# Patient Record
Sex: FEMALE | Race: WHITE | NOT HISPANIC OR LATINO | URBAN - METROPOLITAN AREA
[De-identification: names, ages, dates, MRNs, and addresses within clinical notes are randomized per-mention and may not be internally consistent; named-entity substitution may affect disease eponyms.]

---

## 2017-12-28 ENCOUNTER — OUTPATIENT (OUTPATIENT)
Dept: OUTPATIENT SERVICES | Facility: HOSPITAL | Age: 82
LOS: 1 days | Discharge: HOME | End: 2017-12-28

## 2017-12-28 DIAGNOSIS — Z47.89 ENCOUNTER FOR OTHER ORTHOPEDIC AFTERCARE: ICD-10-CM

## 2017-12-30 ENCOUNTER — OUTPATIENT (OUTPATIENT)
Dept: OUTPATIENT SERVICES | Facility: HOSPITAL | Age: 82
LOS: 1 days | Discharge: HOME | End: 2017-12-30

## 2017-12-30 DIAGNOSIS — R50.9 FEVER, UNSPECIFIED: ICD-10-CM

## 2017-12-31 ENCOUNTER — OUTPATIENT (OUTPATIENT)
Dept: OUTPATIENT SERVICES | Facility: HOSPITAL | Age: 82
LOS: 1 days | Discharge: HOME | End: 2017-12-31

## 2017-12-31 DIAGNOSIS — D64.9 ANEMIA, UNSPECIFIED: ICD-10-CM

## 2018-01-01 ENCOUNTER — OUTPATIENT (OUTPATIENT)
Dept: OUTPATIENT SERVICES | Facility: HOSPITAL | Age: 83
LOS: 1 days | Discharge: HOME | End: 2018-01-01

## 2018-01-01 DIAGNOSIS — R79.9 ABNORMAL FINDING OF BLOOD CHEMISTRY, UNSPECIFIED: ICD-10-CM

## 2018-01-01 DIAGNOSIS — K86.9 DISEASE OF PANCREAS, UNSPECIFIED: ICD-10-CM

## 2018-01-01 DIAGNOSIS — D64.9 ANEMIA, UNSPECIFIED: ICD-10-CM

## 2018-01-01 DIAGNOSIS — I25.10 ATHEROSCLEROTIC HEART DISEASE OF NATIVE CORONARY ARTERY WITHOUT ANGINA PECTORIS: ICD-10-CM

## 2018-01-01 DIAGNOSIS — R17 UNSPECIFIED JAUNDICE: ICD-10-CM

## 2018-01-04 ENCOUNTER — OUTPATIENT (OUTPATIENT)
Dept: OUTPATIENT SERVICES | Facility: HOSPITAL | Age: 83
LOS: 1 days | Discharge: HOME | End: 2018-01-04

## 2018-01-04 DIAGNOSIS — R79.9 ABNORMAL FINDING OF BLOOD CHEMISTRY, UNSPECIFIED: ICD-10-CM

## 2018-01-04 DIAGNOSIS — I25.10 ATHEROSCLEROTIC HEART DISEASE OF NATIVE CORONARY ARTERY WITHOUT ANGINA PECTORIS: ICD-10-CM

## 2018-01-04 DIAGNOSIS — K86.9 DISEASE OF PANCREAS, UNSPECIFIED: ICD-10-CM

## 2018-01-04 DIAGNOSIS — R17 UNSPECIFIED JAUNDICE: ICD-10-CM

## 2018-01-04 DIAGNOSIS — D64.9 ANEMIA, UNSPECIFIED: ICD-10-CM

## 2018-01-05 ENCOUNTER — OUTPATIENT (OUTPATIENT)
Dept: OUTPATIENT SERVICES | Facility: HOSPITAL | Age: 83
LOS: 1 days | Discharge: HOME | End: 2018-01-05

## 2018-01-05 DIAGNOSIS — R17 UNSPECIFIED JAUNDICE: ICD-10-CM

## 2018-01-05 DIAGNOSIS — R79.9 ABNORMAL FINDING OF BLOOD CHEMISTRY, UNSPECIFIED: ICD-10-CM

## 2018-01-05 DIAGNOSIS — I25.10 ATHEROSCLEROTIC HEART DISEASE OF NATIVE CORONARY ARTERY WITHOUT ANGINA PECTORIS: ICD-10-CM

## 2018-01-05 DIAGNOSIS — D64.9 ANEMIA, UNSPECIFIED: ICD-10-CM

## 2018-01-05 DIAGNOSIS — K86.9 DISEASE OF PANCREAS, UNSPECIFIED: ICD-10-CM

## 2018-01-11 ENCOUNTER — OUTPATIENT (OUTPATIENT)
Dept: OUTPATIENT SERVICES | Facility: HOSPITAL | Age: 83
LOS: 1 days | Discharge: HOME | End: 2018-01-11

## 2018-01-11 DIAGNOSIS — K86.9 DISEASE OF PANCREAS, UNSPECIFIED: ICD-10-CM

## 2018-01-11 DIAGNOSIS — D64.9 ANEMIA, UNSPECIFIED: ICD-10-CM

## 2018-01-11 DIAGNOSIS — I25.10 ATHEROSCLEROTIC HEART DISEASE OF NATIVE CORONARY ARTERY WITHOUT ANGINA PECTORIS: ICD-10-CM

## 2018-01-11 DIAGNOSIS — R17 UNSPECIFIED JAUNDICE: ICD-10-CM

## 2018-01-11 DIAGNOSIS — R79.9 ABNORMAL FINDING OF BLOOD CHEMISTRY, UNSPECIFIED: ICD-10-CM

## 2018-01-18 ENCOUNTER — OUTPATIENT (OUTPATIENT)
Dept: OUTPATIENT SERVICES | Facility: HOSPITAL | Age: 83
LOS: 1 days | Discharge: HOME | End: 2018-01-18

## 2018-01-18 DIAGNOSIS — I25.10 ATHEROSCLEROTIC HEART DISEASE OF NATIVE CORONARY ARTERY WITHOUT ANGINA PECTORIS: ICD-10-CM

## 2018-01-18 DIAGNOSIS — R17 UNSPECIFIED JAUNDICE: ICD-10-CM

## 2018-01-18 DIAGNOSIS — D64.9 ANEMIA, UNSPECIFIED: ICD-10-CM

## 2018-01-18 DIAGNOSIS — K86.9 DISEASE OF PANCREAS, UNSPECIFIED: ICD-10-CM

## 2018-01-18 DIAGNOSIS — R79.9 ABNORMAL FINDING OF BLOOD CHEMISTRY, UNSPECIFIED: ICD-10-CM

## 2018-01-22 ENCOUNTER — INPATIENT (INPATIENT)
Facility: HOSPITAL | Age: 83
LOS: 10 days | Discharge: NURSING, REHAB OR LONG-TERM CARE | End: 2018-02-02
Attending: HOSPITALIST

## 2018-01-22 ENCOUNTER — OUTPATIENT (OUTPATIENT)
Dept: OUTPATIENT SERVICES | Facility: HOSPITAL | Age: 83
LOS: 1 days | Discharge: HOME | End: 2018-01-22

## 2018-01-22 DIAGNOSIS — R79.9 ABNORMAL FINDING OF BLOOD CHEMISTRY, UNSPECIFIED: ICD-10-CM

## 2018-02-02 PROBLEM — Z00.00 ENCOUNTER FOR PREVENTIVE HEALTH EXAMINATION: Status: ACTIVE | Noted: 2018-02-02

## 2018-02-03 ENCOUNTER — OUTPATIENT (OUTPATIENT)
Dept: OUTPATIENT SERVICES | Facility: HOSPITAL | Age: 83
LOS: 1 days | Discharge: HOME | End: 2018-02-03

## 2018-02-03 DIAGNOSIS — E78.00 PURE HYPERCHOLESTEROLEMIA, UNSPECIFIED: ICD-10-CM

## 2018-02-04 DIAGNOSIS — R17 UNSPECIFIED JAUNDICE: ICD-10-CM

## 2018-02-04 DIAGNOSIS — D64.9 ANEMIA, UNSPECIFIED: ICD-10-CM

## 2018-02-04 DIAGNOSIS — I25.10 ATHEROSCLEROTIC HEART DISEASE OF NATIVE CORONARY ARTERY WITHOUT ANGINA PECTORIS: ICD-10-CM

## 2018-02-04 DIAGNOSIS — K86.9 DISEASE OF PANCREAS, UNSPECIFIED: ICD-10-CM

## 2018-02-05 ENCOUNTER — OUTPATIENT (OUTPATIENT)
Dept: OUTPATIENT SERVICES | Facility: HOSPITAL | Age: 83
LOS: 1 days | Discharge: HOME | End: 2018-02-05

## 2018-02-05 DIAGNOSIS — R79.9 ABNORMAL FINDING OF BLOOD CHEMISTRY, UNSPECIFIED: ICD-10-CM

## 2018-02-06 ENCOUNTER — OUTPATIENT (OUTPATIENT)
Dept: OUTPATIENT SERVICES | Facility: HOSPITAL | Age: 83
LOS: 1 days | Discharge: HOME | End: 2018-02-06

## 2018-02-06 DIAGNOSIS — R17 UNSPECIFIED JAUNDICE: ICD-10-CM

## 2018-02-06 DIAGNOSIS — R53.83 OTHER FATIGUE: ICD-10-CM

## 2018-02-06 DIAGNOSIS — E78.00 PURE HYPERCHOLESTEROLEMIA, UNSPECIFIED: ICD-10-CM

## 2018-02-06 DIAGNOSIS — I10 ESSENTIAL (PRIMARY) HYPERTENSION: ICD-10-CM

## 2018-02-06 DIAGNOSIS — I25.10 ATHEROSCLEROTIC HEART DISEASE OF NATIVE CORONARY ARTERY WITHOUT ANGINA PECTORIS: ICD-10-CM

## 2018-02-06 DIAGNOSIS — K86.9 DISEASE OF PANCREAS, UNSPECIFIED: ICD-10-CM

## 2018-02-06 DIAGNOSIS — D64.9 ANEMIA, UNSPECIFIED: ICD-10-CM

## 2018-02-06 DIAGNOSIS — E87.1 HYPO-OSMOLALITY AND HYPONATREMIA: ICD-10-CM

## 2018-02-06 DIAGNOSIS — K80.50 CALCULUS OF BILE DUCT WITHOUT CHOLANGITIS OR CHOLECYSTITIS WITHOUT OBSTRUCTION: ICD-10-CM

## 2018-02-06 DIAGNOSIS — E87.5 HYPERKALEMIA: ICD-10-CM

## 2018-02-06 DIAGNOSIS — K44.9 DIAPHRAGMATIC HERNIA WITHOUT OBSTRUCTION OR GANGRENE: ICD-10-CM

## 2018-02-06 DIAGNOSIS — Z79.899 OTHER LONG TERM (CURRENT) DRUG THERAPY: ICD-10-CM

## 2018-02-06 DIAGNOSIS — C25.9 MALIGNANT NEOPLASM OF PANCREAS, UNSPECIFIED: ICD-10-CM

## 2018-02-07 ENCOUNTER — OUTPATIENT (OUTPATIENT)
Dept: OUTPATIENT SERVICES | Facility: HOSPITAL | Age: 83
LOS: 1 days | Discharge: HOME | End: 2018-02-07

## 2018-02-07 DIAGNOSIS — D13.6 BENIGN NEOPLASM OF PANCREAS: ICD-10-CM

## 2018-02-07 DIAGNOSIS — K76.9 LIVER DISEASE, UNSPECIFIED: ICD-10-CM

## 2018-02-09 ENCOUNTER — OUTPATIENT (OUTPATIENT)
Dept: OUTPATIENT SERVICES | Facility: HOSPITAL | Age: 83
LOS: 1 days | Discharge: HOME | End: 2018-02-09

## 2018-02-09 DIAGNOSIS — R79.9 ABNORMAL FINDING OF BLOOD CHEMISTRY, UNSPECIFIED: ICD-10-CM

## 2018-02-12 ENCOUNTER — APPOINTMENT (OUTPATIENT)
Dept: SURGERY | Facility: CLINIC | Age: 83
End: 2018-02-12
Payer: MEDICARE

## 2018-02-12 VITALS
DIASTOLIC BLOOD PRESSURE: 64 MMHG | HEIGHT: 63 IN | BODY MASS INDEX: 20.91 KG/M2 | HEART RATE: 76 BPM | TEMPERATURE: 98.4 F | SYSTOLIC BLOOD PRESSURE: 136 MMHG | WEIGHT: 118 LBS

## 2018-02-12 DIAGNOSIS — G30.9 ALZHEIMER'S DISEASE, UNSPECIFIED: ICD-10-CM

## 2018-02-12 DIAGNOSIS — I10 ESSENTIAL (PRIMARY) HYPERTENSION: ICD-10-CM

## 2018-02-12 DIAGNOSIS — F02.80 ALZHEIMER'S DISEASE, UNSPECIFIED: ICD-10-CM

## 2018-02-12 PROCEDURE — 99214 OFFICE O/P EST MOD 30 MIN: CPT

## 2018-02-16 ENCOUNTER — OUTPATIENT (OUTPATIENT)
Dept: OUTPATIENT SERVICES | Facility: HOSPITAL | Age: 83
LOS: 1 days | Discharge: HOME | End: 2018-02-16

## 2018-02-16 DIAGNOSIS — R79.9 ABNORMAL FINDING OF BLOOD CHEMISTRY, UNSPECIFIED: ICD-10-CM

## 2018-02-17 RX ORDER — ENOXAPARIN SODIUM 100 MG/ML
40 INJECTION SUBCUTANEOUS
Refills: 0 | Status: ACTIVE | COMMUNITY

## 2018-02-17 RX ORDER — PANTOPRAZOLE SODIUM 40 MG/1
40 TABLET, DELAYED RELEASE ORAL
Refills: 0 | Status: ACTIVE | COMMUNITY

## 2018-02-23 ENCOUNTER — OUTPATIENT (OUTPATIENT)
Dept: OUTPATIENT SERVICES | Facility: HOSPITAL | Age: 83
LOS: 1 days | Discharge: HOME | End: 2018-02-23

## 2018-02-23 DIAGNOSIS — R79.9 ABNORMAL FINDING OF BLOOD CHEMISTRY, UNSPECIFIED: ICD-10-CM

## 2018-03-02 ENCOUNTER — OUTPATIENT (OUTPATIENT)
Dept: OUTPATIENT SERVICES | Facility: HOSPITAL | Age: 83
LOS: 1 days | Discharge: HOME | End: 2018-03-02

## 2018-03-02 DIAGNOSIS — R79.9 ABNORMAL FINDING OF BLOOD CHEMISTRY, UNSPECIFIED: ICD-10-CM

## 2018-03-09 ENCOUNTER — OUTPATIENT (OUTPATIENT)
Dept: OUTPATIENT SERVICES | Facility: HOSPITAL | Age: 83
LOS: 1 days | Discharge: HOME | End: 2018-03-09

## 2018-03-09 DIAGNOSIS — R79.9 ABNORMAL FINDING OF BLOOD CHEMISTRY, UNSPECIFIED: ICD-10-CM

## 2018-03-12 ENCOUNTER — OUTPATIENT (OUTPATIENT)
Dept: OUTPATIENT SERVICES | Facility: HOSPITAL | Age: 83
LOS: 1 days | Discharge: HOME | End: 2018-03-12

## 2018-03-12 DIAGNOSIS — R79.9 ABNORMAL FINDING OF BLOOD CHEMISTRY, UNSPECIFIED: ICD-10-CM

## 2018-03-12 LAB — AMMONIA BLD-MCNC: 32 MMOL/L — SIGNIFICANT CHANGE UP (ref 11–55)

## 2018-03-17 ENCOUNTER — OUTPATIENT (OUTPATIENT)
Dept: OUTPATIENT SERVICES | Facility: HOSPITAL | Age: 83
LOS: 1 days | Discharge: HOME | End: 2018-03-17

## 2018-03-17 DIAGNOSIS — E72.20 DISORDER OF UREA CYCLE METABOLISM, UNSPECIFIED: ICD-10-CM

## 2018-03-17 DIAGNOSIS — Z11.3 ENCOUNTER FOR SCREENING FOR INFECTIONS WITH A PREDOMINANTLY SEXUAL MODE OF TRANSMISSION: ICD-10-CM

## 2018-03-20 ENCOUNTER — OUTPATIENT (OUTPATIENT)
Dept: OUTPATIENT SERVICES | Facility: HOSPITAL | Age: 83
LOS: 1 days | Discharge: HOME | End: 2018-03-20

## 2018-03-20 DIAGNOSIS — D64.9 ANEMIA, UNSPECIFIED: ICD-10-CM

## 2018-03-26 ENCOUNTER — APPOINTMENT (OUTPATIENT)
Dept: SURGERY | Facility: CLINIC | Age: 83
End: 2018-03-26
Payer: MEDICARE

## 2018-03-26 VITALS
BODY MASS INDEX: 18.43 KG/M2 | SYSTOLIC BLOOD PRESSURE: 132 MMHG | DIASTOLIC BLOOD PRESSURE: 70 MMHG | HEIGHT: 63 IN | TEMPERATURE: 99.2 F | HEART RATE: 102 BPM | WEIGHT: 104 LBS

## 2018-03-26 PROCEDURE — 99213 OFFICE O/P EST LOW 20 MIN: CPT

## 2018-04-03 ENCOUNTER — OUTPATIENT (OUTPATIENT)
Dept: OUTPATIENT SERVICES | Facility: HOSPITAL | Age: 83
LOS: 1 days | Discharge: HOME | End: 2018-04-03

## 2018-04-03 DIAGNOSIS — D64.9 ANEMIA, UNSPECIFIED: ICD-10-CM

## 2018-06-26 ENCOUNTER — OUTPATIENT (OUTPATIENT)
Dept: OUTPATIENT SERVICES | Facility: HOSPITAL | Age: 83
LOS: 1 days | Discharge: HOME | End: 2018-06-26

## 2018-06-26 DIAGNOSIS — D64.9 ANEMIA, UNSPECIFIED: ICD-10-CM

## 2018-11-03 ENCOUNTER — OUTPATIENT (OUTPATIENT)
Dept: OUTPATIENT SERVICES | Facility: HOSPITAL | Age: 83
LOS: 1 days | Discharge: HOME | End: 2018-11-03

## 2018-11-03 DIAGNOSIS — E78.5 HYPERLIPIDEMIA, UNSPECIFIED: ICD-10-CM

## 2019-02-10 PROBLEM — D49.0 IPMN (INTRADUCTAL PAPILLARY MUCINOUS NEOPLASM): Status: ACTIVE | Noted: 2018-02-12

## 2019-02-11 ENCOUNTER — APPOINTMENT (OUTPATIENT)
Dept: SURGERY | Facility: CLINIC | Age: 84
End: 2019-02-11

## 2019-02-11 DIAGNOSIS — D49.0 NEOPLASM OF UNSPECIFIED BEHAVIOR OF DIGESTIVE SYSTEM: ICD-10-CM

## 2021-09-13 ENCOUNTER — INPATIENT (INPATIENT)
Facility: HOSPITAL | Age: 86
LOS: 7 days | Discharge: NURSING, REHAB OR LONG-TERM CARE | End: 2021-09-21
Attending: INTERNAL MEDICINE | Admitting: INTERNAL MEDICINE
Payer: MEDICARE

## 2021-09-13 VITALS
SYSTOLIC BLOOD PRESSURE: 85 MMHG | OXYGEN SATURATION: 98 % | RESPIRATION RATE: 16 BRPM | HEART RATE: 100 BPM | DIASTOLIC BLOOD PRESSURE: 49 MMHG

## 2021-09-13 LAB
ALBUMIN SERPL ELPH-MCNC: 4.6 G/DL — SIGNIFICANT CHANGE UP (ref 3.5–5.2)
ALP SERPL-CCNC: 210 U/L — HIGH (ref 30–115)
ALT FLD-CCNC: 65 U/L — HIGH (ref 0–41)
ANION GAP SERPL CALC-SCNC: 23 MMOL/L — HIGH (ref 7–14)
APPEARANCE UR: ABNORMAL
APTT BLD: 29.5 SEC — SIGNIFICANT CHANGE UP (ref 27–39.2)
AST SERPL-CCNC: 54 U/L — HIGH (ref 0–41)
BACTERIA # UR AUTO: ABNORMAL
BASE EXCESS BLDV CALC-SCNC: -12.2 MMOL/L — LOW (ref -2–3)
BASOPHILS # BLD AUTO: 0.02 K/UL — SIGNIFICANT CHANGE UP (ref 0–0.2)
BASOPHILS NFR BLD AUTO: 0.1 % — SIGNIFICANT CHANGE UP (ref 0–1)
BILIRUB SERPL-MCNC: 0.7 MG/DL — SIGNIFICANT CHANGE UP (ref 0.2–1.2)
BILIRUB UR-MCNC: NEGATIVE — SIGNIFICANT CHANGE UP
BUN SERPL-MCNC: 191 MG/DL — CRITICAL HIGH (ref 10–20)
CA-I SERPL-SCNC: 1.35 MMOL/L — HIGH (ref 1.15–1.33)
CALCIUM SERPL-MCNC: 11.4 MG/DL — HIGH (ref 8.5–10.1)
CHLORIDE SERPL-SCNC: 109 MMOL/L — SIGNIFICANT CHANGE UP (ref 98–110)
CO2 SERPL-SCNC: 10 MMOL/L — LOW (ref 17–32)
COLOR SPEC: YELLOW — SIGNIFICANT CHANGE UP
CREAT SERPL-MCNC: 8.6 MG/DL — CRITICAL HIGH (ref 0.7–1.5)
DIFF PNL FLD: ABNORMAL
EOSINOPHIL # BLD AUTO: 0 K/UL — SIGNIFICANT CHANGE UP (ref 0–0.7)
EOSINOPHIL NFR BLD AUTO: 0 % — SIGNIFICANT CHANGE UP (ref 0–8)
EPI CELLS # UR: 1 /HPF — SIGNIFICANT CHANGE UP (ref 0–5)
GAS PNL BLDV: 134 MMOL/L — LOW (ref 136–145)
GAS PNL BLDV: SIGNIFICANT CHANGE UP
GLUCOSE BLDC GLUCOMTR-MCNC: 193 MG/DL — HIGH (ref 70–99)
GLUCOSE BLDC GLUCOMTR-MCNC: 258 MG/DL — HIGH (ref 70–99)
GLUCOSE SERPL-MCNC: 263 MG/DL — HIGH (ref 70–99)
GLUCOSE UR QL: NEGATIVE — SIGNIFICANT CHANGE UP
HCO3 BLDV-SCNC: 13 MMOL/L — LOW (ref 22–29)
HCT VFR BLD CALC: 47.6 % — HIGH (ref 37–47)
HGB BLD-MCNC: 15.1 G/DL — SIGNIFICANT CHANGE UP (ref 12–16)
HYALINE CASTS # UR AUTO: 26 /LPF — HIGH (ref 0–7)
IMM GRANULOCYTES NFR BLD AUTO: 1 % — HIGH (ref 0.1–0.3)
INR BLD: 1.19 RATIO — SIGNIFICANT CHANGE UP (ref 0.65–1.3)
KETONES UR-MCNC: NEGATIVE — SIGNIFICANT CHANGE UP
LACTATE BLDV-MCNC: 3.1 MMOL/L — HIGH (ref 0.5–2)
LEUKOCYTE ESTERASE UR-ACNC: ABNORMAL
LYMPHOCYTES # BLD AUTO: 1.11 K/UL — LOW (ref 1.2–3.4)
LYMPHOCYTES # BLD AUTO: 6.2 % — LOW (ref 20.5–51.1)
MCHC RBC-ENTMCNC: 30.2 PG — SIGNIFICANT CHANGE UP (ref 27–31)
MCHC RBC-ENTMCNC: 31.7 G/DL — LOW (ref 32–37)
MCV RBC AUTO: 95.2 FL — SIGNIFICANT CHANGE UP (ref 81–99)
MONOCYTES # BLD AUTO: 0.79 K/UL — HIGH (ref 0.1–0.6)
MONOCYTES NFR BLD AUTO: 4.4 % — SIGNIFICANT CHANGE UP (ref 1.7–9.3)
NEUTROPHILS # BLD AUTO: 15.94 K/UL — HIGH (ref 1.4–6.5)
NEUTROPHILS NFR BLD AUTO: 88.3 % — HIGH (ref 42.2–75.2)
NITRITE UR-MCNC: NEGATIVE — SIGNIFICANT CHANGE UP
NRBC # BLD: 0 /100 WBCS — SIGNIFICANT CHANGE UP (ref 0–0)
PCO2 BLDV: 29 MMHG — LOW (ref 39–42)
PH BLDV: 7.27 — LOW (ref 7.32–7.43)
PH UR: 6.5 — SIGNIFICANT CHANGE UP (ref 5–8)
PLATELET # BLD AUTO: 396 K/UL — SIGNIFICANT CHANGE UP (ref 130–400)
PO2 BLDV: 45 MMHG — SIGNIFICANT CHANGE UP
POTASSIUM BLDV-SCNC: 8.6 MMOL/L — CRITICAL HIGH (ref 3.5–5.1)
POTASSIUM SERPL-MCNC: 8.1 MMOL/L — CRITICAL HIGH (ref 3.5–5)
POTASSIUM SERPL-SCNC: 8.1 MMOL/L — CRITICAL HIGH (ref 3.5–5)
PROT SERPL-MCNC: 8.3 G/DL — HIGH (ref 6–8)
PROT UR-MCNC: ABNORMAL
PROTHROM AB SERPL-ACNC: 13.7 SEC — HIGH (ref 9.95–12.87)
RBC # BLD: 5 M/UL — SIGNIFICANT CHANGE UP (ref 4.2–5.4)
RBC # FLD: 13.5 % — SIGNIFICANT CHANGE UP (ref 11.5–14.5)
RBC CASTS # UR COMP ASSIST: 2 /HPF — SIGNIFICANT CHANGE UP (ref 0–4)
SARS-COV-2 RNA SPEC QL NAA+PROBE: SIGNIFICANT CHANGE UP
SODIUM SERPL-SCNC: 142 MMOL/L — SIGNIFICANT CHANGE UP (ref 135–146)
SP GR SPEC: 1.02 — SIGNIFICANT CHANGE UP (ref 1.01–1.03)
UROBILINOGEN FLD QL: SIGNIFICANT CHANGE UP
WBC # BLD: 18.04 K/UL — HIGH (ref 4.8–10.8)
WBC # FLD AUTO: 18.04 K/UL — HIGH (ref 4.8–10.8)
WBC UR QL: >720 /HPF — HIGH (ref 0–5)

## 2021-09-13 PROCEDURE — 99291 CRITICAL CARE FIRST HOUR: CPT

## 2021-09-13 PROCEDURE — 93010 ELECTROCARDIOGRAM REPORT: CPT

## 2021-09-13 RX ORDER — VANCOMYCIN HCL 1 G
1000 VIAL (EA) INTRAVENOUS ONCE
Refills: 0 | Status: COMPLETED | OUTPATIENT
Start: 2021-09-13 | End: 2021-09-13

## 2021-09-13 RX ORDER — CEFEPIME 1 G/1
2000 INJECTION, POWDER, FOR SOLUTION INTRAMUSCULAR; INTRAVENOUS ONCE
Refills: 0 | Status: COMPLETED | OUTPATIENT
Start: 2021-09-13 | End: 2021-09-13

## 2021-09-13 RX ORDER — SODIUM CHLORIDE 9 MG/ML
1500 INJECTION, SOLUTION INTRAVENOUS ONCE
Refills: 0 | Status: COMPLETED | OUTPATIENT
Start: 2021-09-13 | End: 2021-09-13

## 2021-09-13 RX ORDER — INSULIN HUMAN 100 [IU]/ML
10 INJECTION, SOLUTION SUBCUTANEOUS ONCE
Refills: 0 | Status: COMPLETED | OUTPATIENT
Start: 2021-09-13 | End: 2021-09-13

## 2021-09-13 RX ORDER — ACETAMINOPHEN 500 MG
650 TABLET ORAL ONCE
Refills: 0 | Status: COMPLETED | OUTPATIENT
Start: 2021-09-13 | End: 2021-09-13

## 2021-09-13 RX ORDER — SODIUM BICARBONATE 1 MEQ/ML
0.75 SYRINGE (ML) INTRAVENOUS
Qty: 150 | Refills: 0 | Status: DISCONTINUED | OUTPATIENT
Start: 2021-09-13 | End: 2021-09-14

## 2021-09-13 RX ORDER — DEXTROSE 50 % IN WATER 50 %
50 SYRINGE (ML) INTRAVENOUS ONCE
Refills: 0 | Status: COMPLETED | OUTPATIENT
Start: 2021-09-13 | End: 2021-09-13

## 2021-09-13 RX ADMIN — Medication 250 MEQ/KG/HR: at 22:08

## 2021-09-13 RX ADMIN — INSULIN HUMAN 10 UNIT(S): 100 INJECTION, SOLUTION SUBCUTANEOUS at 21:48

## 2021-09-13 RX ADMIN — Medication 50 MILLILITER(S): at 21:48

## 2021-09-13 RX ADMIN — Medication 650 MILLIGRAM(S): at 17:07

## 2021-09-13 RX ADMIN — SODIUM CHLORIDE 1000 MILLILITER(S): 9 INJECTION, SOLUTION INTRAVENOUS at 21:46

## 2021-09-13 RX ADMIN — Medication 250 MILLIGRAM(S): at 17:06

## 2021-09-13 RX ADMIN — CEFEPIME 100 MILLIGRAM(S): 1 INJECTION, POWDER, FOR SOLUTION INTRAMUSCULAR; INTRAVENOUS at 17:07

## 2021-09-13 NOTE — ED ADULT TRIAGE NOTE - CHIEF COMPLAINT QUOTE
PT BIBA from [facility] for unresponsiveness. PT is hypotensive and tachycardic on arrival and unresponsive to verbal & painful stimuli.

## 2021-09-13 NOTE — ED PROVIDER NOTE - PHYSICAL EXAMINATION
GENERAL: Ill-appearing   SKIN: warm, dry  HEAD: Normocephalic; atraumatic.  EYES: PERRLA, EOMI  CARD: S1, S2 normal; no murmurs, gallops, or rubs. Tachycardic rate   RESP: LCTAB; No wheezes, rales, rhonchi, or stridor.  ABD: soft, nontender, and nondistended  NEURO: A&O x 0, withdraws to pain

## 2021-09-13 NOTE — H&P ADULT - CONVERSATION DETAILS
spoke about grave prognosis and isaac discussed as patient wanted to be DNR/DNI when she had capacity.

## 2021-09-13 NOTE — ED PROVIDER NOTE - ATTENDING CONTRIBUTION TO CARE
I personally evaluated the patient. I reviewed the Resident’s or Physician Assistant’s note (as assigned above), and agree with the findings and plan except as documented in my note.   89 Y/O F DEMENTIA, HTN, CAD, PANCREATIC MASS BROUGHT TO ED FROM SNF WITH DECREASED RESPONSIVENESS TODAY. + LETHARGY. PT UNABLE TO GIVE HX DUE TO AMS. PT WAS DNR/DNH AT SNF WHICH WAS RESCINDED BY THE FAMILY TODAY. VITALS NOTED. RESPONSIVE TO PAINFUL STIMULI. NCAT PERRL. EOMI. OP WITH DRIED FOOD. DRY MUCOUS MEMBRANES. NECK SUPPLE. LUNGS CLEAR B/L. TACHYCARDIA. S1S2. ABD- SOFT NONTENDER. NO LEG EDEMA. NO FACIAL DROOP. PT MOVING ALL EXTREMITIES. NO RASH.

## 2021-09-13 NOTE — ED ADULT NURSE NOTE - CHIEF COMPLAINT QUOTE
PT BIBA from [facility] for unresponsiveness. PT is hypotensive and tachycardic on arrival and unresponsive to verbal & painful stimuli.

## 2021-09-13 NOTE — ED ADULT NURSE NOTE - PAIN: PRESENCE, MLM
Date & Time: 6/17/2020, 11:02 AM  Patient: Isela Hines  Encounter Provider(s):    Nicki Gray MD       To Whom It May Concern:    Roberta Douglas was seen and treated in our department on 6/17/2020.  She should not return to work until 06/20/202 non-verbal indicators of pain/discomfort absent

## 2021-09-13 NOTE — CHART NOTE - NSCHARTNOTEFT_GEN_A_CORE
Consult received for [facility] re: patient who previously was DNR/DNI/DNH.   The patient was living at [facility], AMS X days, brought in for evaluation.    Called son, Jesus, who is offical HCP, and his wife, Tali, on phone.     Per family, patient never wanted to be intubated, or kept alive on life support, feeding tubes, etc. She was doing well at NH, and had a DNR/I/do not rehospitalize order on her MOLST. Her family however did decide to have her hospitalized out of concern for her abrupt change in mental status.     Patient's other son, Musa, strongly feels the patient should be Full Code and everything should be done to keep her alive, based on Judaism beliefs. Due to his change in beliefs, family revoked the DNR/DNI this hospitalization. Delta (HCP) and his wife would not like the patient intubated but include Musa in decision making. Encouraged them to have a further conversation with Musa about honoring the patient's wishes to not be intubated or kept on life support. Discussed that CPR and intubation would very likely prove to be more burdensome than therapeutic in a patient of her age/comorbidities. Provided family with palliative # for questions.     Plan to FU for FUll consult tomorrow.    PRN

## 2021-09-13 NOTE — H&P ADULT - HISTORY OF PRESENT ILLNESS
89 yo female w/ PMH of pancreatic mass, CAD, HTN presents for unresponsiveness.  Unable to provide further HPI due to mental status. Per nursing home NP, baseline mental status is A&O x 1, for past couple weeks a little bit more confused than normal, yesterday started becoming lethargic and minimally responsive.     89 yo female w/ PMH of pancreatic mass, CAD, HTN presents for unresponsiveness. Unable to provide further HPI due to mental status. Per nursing home NP, baseline mental status is A&Ox1, for past couple weeks a little bit more confused than normal, yesterday started becoming lethargic and minimally responsive.Patient is from [facility] and was DNR/DNI/DNH but due to her worsening condition. Sons were spoken to and initially rescinded the DNR/DNI/DNH but due to patient's clinical condition and poor prognosis, patient was made DNR/DNI.     In the ED, labs were remarkable for K 8.1, bicarb 10, , Cr 8.6 (baseline 1.0 in february), Ca 11.4, protein 8.4, VBG pH 7.27, pCO2 29. UA is positive. EKG is NSR.     Vital Signs Last 24 Hrs  T(C): --  T(F): --  HR: 88 (13 Sep 2021 22:10) (84 - 150)  BP: 106/62 (13 Sep 2021 22:10) (85/49 - 169/104)  BP(mean): 79 (13 Sep 2021 22:10) (63 - 124)  ABP: --  ABP(mean): --  RR: 18 (13 Sep 2021 22:10) (16 - 18)  SpO2: 100% (13 Sep 2021 22:10) (97% - 100%)

## 2021-09-13 NOTE — H&P ADULT - NSHPLABSRESULTS_GEN_ALL_CORE
15.1   18.04 )-----------( 396      ( 13 Sep 2021 16:50 )             47.6           142  |  109  |  191<HH>  ----------------------------<  263<H>  8.1<HH>   |  10<L>  |  8.6<HH>    Ca    11.4<H>      13 Sep 2021 16:50    TPro  8.3<H>  /  Alb  4.6  /  TBili  0.7  /  DBili  x   /  AST  54<H>  /  ALT  65<H>  /  AlkPhos  210<H>                Urinalysis Basic - ( 13 Sep 2021 19:20 )    Color: Yellow / Appearance: Turbid / S.017 / pH: x  Gluc: x / Ketone: Negative  / Bili: Negative / Urobili: <2 mg/dL   Blood: x / Protein: 300 mg/dL / Nitrite: Negative   Leuk Esterase: Large / RBC: 2 /HPF / WBC >720 /HPF   Sq Epi: x / Non Sq Epi: 1 /HPF / Bacteria: Moderate        PT/INR - ( 13 Sep 2021 16:50 )   PT: 13.70 sec;   INR: 1.19 ratio         PTT - ( 13 Sep 2021 16:50 )  PTT:29.5 sec    Lactate Trend            CAPILLARY BLOOD GLUCOSE      POCT Blood Glucose.: 258 mg/dL (13 Sep 2021 22:18)

## 2021-09-13 NOTE — ED PROVIDER NOTE - PROGRESS NOTE DETAILS
Raffi: Discussed with pt's sons Jesus Zonia and Musa Brar.  Jesus is the health care proxy and would like to have DNR, DNH, DNI rescinded and to follow his brother's wishes.  Spoke with his brother Musa and would like everything done for pt. Raffi: Palliative care aware of pt, will follow and discuss pt with sons. Raffi: Per palliative care Verenice, she had discussed DNR/DNI status with Jesus and he is reconsidering, he will think about further and report back. PT SIGNED OUT TO DR. LANZA, FOLLOW UP LABS, CXR, U/A, REASSESS AND DISPO. TA: Pt approved for step down

## 2021-09-13 NOTE — H&P ADULT - ATTENDING COMMENTS
HPI:  89 yo female w/ PMH of pancreatic mass, CAD, HTN presents for unresponsiveness. Unable to provide further HPI due to mental status. Per nursing home NP, baseline mental status is A&Ox1, for past couple weeks a little bit more confused than normal, yesterday started becoming lethargic and minimally responsive.Patient is from [facility] and was DNR/DNI/DNH but due to her worsening condition. Sons were spoken to and initially rescinded the DNR/DNI/DNH but due to patient's clinical condition and poor prognosis, patient was made DNR/DNI.     In the ED, labs were remarkable for K 8.1, bicarb 10, , Cr 8.6 (baseline 1.0 in february), Ca 11.4, protein 8.4, VBG pH 7.27, pCO2 29. UA is positive. EKG is NSR.     Vital Signs Last 24 Hrs  T(C): --  T(F): --  HR: 88 (13 Sep 2021 22:10) (84 - 150)  BP: 106/62 (13 Sep 2021 22:10) (85/49 - 169/104)  BP(mean): 79 (13 Sep 2021 22:10) (63 - 124)  ABP: --  ABP(mean): --  RR: 18 (13 Sep 2021 22:10) (16 - 18)  SpO2: 100% (13 Sep 2021 22:10) (97% - 100%)       (13 Sep 2021 23:57)    REVIEW OF SYSTEMS: see cc/HPI   CONSTITUTIONAL: No weakness, fevers or chills  EYES/ENT: No visual changes;  No vertigo or throat pain   NECK: No pain or stiffness  RESPIRATORY: No cough, wheezing, hemoptysis; No shortness of breath  CARDIOVASCULAR: No chest pain or palpitations  GASTROINTESTINAL: No abdominal or epigastric pain. No nausea, vomiting, or hematemesis; No diarrhea or constipation. No melena or hematochezia.  GENITOURINARY: No dysuria, frequency or hematuria  NEUROLOGICAL: No numbness or weakness  SKIN: No itching, rashes    Physical Exam: see cc/HPI   General: WN/WD NAD  Neurology: A&Ox3, nonfocal, follows commands  Eyes: PERRLA/ EOMI  ENT/Neck: Neck supple, trachea midline, No JVD  Respiratory: CTA B/L, No wheezing, rales, rhonchi  CV: Normal rate regular rhythm, S1S2, no murmurs, rubs or gallops  Abdominal: Soft, NT, ND +BS,   Extremities: No edema, + peripheral pulses  Skin: No Rashes, Hematoma, Ecchymosis  Incisions: n/a  Tubes: n/a HPI:  89 yo female w/ PMH of pancreatic mass, CAD, HTN presents for unresponsiveness. Unable to provide further HPI due to mental status. Per nursing home NP, baseline mental status is A&Ox1, for past couple weeks a little bit more confused than normal, yesterday started becoming lethargic and minimally responsive.Patient is from [facility] and was DNR/DNI/DNH but due to her worsening condition. Sons were spoken to and initially rescinded the DNR/DNI/DNH but due to patient's clinical condition and poor prognosis, patient was made DNR/DNI.     In the ED, labs were remarkable for K 8.1, bicarb 10, , Cr 8.6 (baseline 1.0 in february), Ca 11.4, protein 8.4, VBG pH 7.27, pCO2 29. UA is positive. EKG is NSR.     Vital Signs Last 24 Hrs  T(C): --  T(F): --  HR: 88 (13 Sep 2021 22:10) (84 - 150)  BP: 106/62 (13 Sep 2021 22:10) (85/49 - 169/104)  BP(mean): 79 (13 Sep 2021 22:10) (63 - 124)  ABP: --  ABP(mean): --  RR: 18 (13 Sep 2021 22:10) (16 - 18)  SpO2: 100% (13 Sep 2021 22:10) (97% - 100%)       (13 Sep 2021 23:57)    REVIEW OF SYSTEMS: see cc/HPI   CONSTITUTIONAL: No weakness, fevers or chills, (+) altered mental status   EYES/ENT: No visual changes;  Unable to obtain full ROS- patient unresponsive    NECK: Unable to obtain full ROS   RESPIRATORY: Unable to obtain full ROS   CARDIOVASCULAR: No chest pain or palpitations  GASTROINTESTINAL: No chest pain or palpitations  GENITOURINARY: No dysuria, frequency or hematuria, see cc/HPI  NEUROLOGICAL: No numbness or weakness  SKIN: No itching, rashes    Physical Exam: see cc/HPI   General: Elderly, poor nutrition and unresponsive   Neurology: Responds to painful stimuli,  does not follow commands  Eyes: PERRLA  ENT/Neck: Neck supple, trachea midline, No JVD  Respiratory: CTA B/L, No wheezing, rales, rhonchi  CV: Normal rate regular rhythm, S1S2, no murmurs, rubs or gallops  Abdominal: Soft, NT, ND +BS,   Extremities: No edema, + peripheral pulses  Skin: No Rashes, Hematoma, Ecchymosis  Incisions: n/a  Tubes: (+) valencia in place w/ heavy sediment     A/p  Metabolic encephalopathy - multifactorial - infection/ OSMAR/ dementia at baseline  OSMAR w/ metabolic acidosis and hyperkalemia / hypercalcemia   Sepsis/ shock / UTI   Hypercalcemia  -admit to SDU  -NPO for now   -Is and Os /IV fluids- bicarb drip  -serial BMP, iPhos, Ca++, Mg++  -check PTH, Vit D, SPEP, UPEP   -Nephrology eval   -renal lytes/Cr and U/S   -IV abx   -check blood and Ucx   -NGT placement for Rx  Appropriate for palliative care and IF decision is made to forego aggressive measures would make CMO    HTN - BP 85/49 in the ED   -hold antihypertensives for now     H/o CAD   -hold antihypertensive     H/o Pancreatic mass   -??palliative care    DVT prophylaxis    Grave overall prognosis     DNR/DNI - MOLST on chart

## 2021-09-13 NOTE — ED ADULT NURSE NOTE - NSIMPLEMENTINTERV_GEN_ALL_ED
Implemented All Fall Risk Interventions:  [place] to call system. Call bell, personal items and telephone within reach. Instruct patient to call for assistance. Room bathroom lighting operational. Non-slip footwear when patient is off stretcher. Physically safe environment: no spills, clutter or unnecessary equipment. Stretcher in lowest position, wheels locked, appropriate side rails in place. Provide visual cue, wrist band, yellow gown, etc. Monitor gait and stability. Monitor for mental status changes and reorient to person, place, and time. Review medications for side effects contributing to fall risk. Reinforce activity limits and safety measures with patient and family. Patient called back and updated with results. Patient verbalized understanding. Patient states the block helped on the inside for about a week but didn't touch the pain on the outside.

## 2021-09-13 NOTE — H&P ADULT - NSHPPHYSICALEXAM_GEN_ALL_CORE
GENERAL: ill appearing, obtunded AAOx0  HEAD:  Atraumatic, Normocephalic  NECK: Supple  CHEST/LUNG: Clear to auscultation bilaterally; No wheeze; No crackles; No accessory muscles used  HEART: Regular rate and rhythm; No murmurs;   ABDOMEN: Soft, Nontender, Nondistended; Bowel sounds present; No guarding  EXTREMITIES:  No cyanosis or edema  PSYCH: AAOx0  NEUROLOGY: nonresponsive, only responds to painful stimuli  SKIN: No rashes or lesions

## 2021-09-13 NOTE — ED ADULT NURSE NOTE - OBJECTIVE STATEMENT
PT BIBA from [facility] for unresponsiveness. PT is hypotensive and tachycardic on arrival and unresponsive to verbal & painful stimuli.

## 2021-09-13 NOTE — H&P ADULT - ASSESSMENT
89 yo female w/ PMH of pancreatic mass, CAD, HTN presents for unresponsiveness.     #Metabolic encephalopathy 2/2 acute renal failure  #Hyperkalemia with metabolic acidosis  - Bicarb 10, K 8.1, Cr 8, pH 7.27  - s/p insulin and d50 and bicarb gtt  - EKG NSR  - valencia placed in ED  - c/w bicarb gtt 100cc/hr  - BMP q4h, f/u 1130 and am  - Attempted to place NGT multiple times but unable to due to patient's condition so unable to start lokelma  - Unable to Kayexalate enema due to patient is having diarrhea  - c/w insulin and d50  - Nephro consulted for possible RRT, and patient is not a good candidate for RRT and recommended medical management  - Spoke to sons and patient is made DNR/DNI  - Palliative care consulted and will f/u am    #Hypercalcemia  - Ca 11.4, elevated protein  - possibly 2/2 malignancy  - ordered SPEP and UPEP, ionized Ca, PTHrp, PTH and phos    #UTI  - UA positive  - s/p cefepime and vanc in ED  - blood and uclx pending  - start rocephin     #DVT ppx: heparin subq  #GI ppx: not indicated  #Diet: NPO  #Dispo: SDU  DNR/DNI

## 2021-09-13 NOTE — ED PROVIDER NOTE - OBJECTIVE STATEMENT
89 yo female w/ PMH of pancreatic mass, CAD, HTN presents for unresponsiveness.  Unable to provide further HPI due to mental status 89 yo female w/ PMH of pancreatic mass, CAD, HTN presents for unresponsiveness.  Unable to provide further HPI due to mental status. Per nursing home NP, baseline mental status is A&O x 1, for past couple weeks a little bit more confused than normal, yesterday started becoming lethargic and minimally responsive.

## 2021-09-14 DIAGNOSIS — F03.90 UNSPECIFIED DEMENTIA WITHOUT BEHAVIORAL DISTURBANCE: ICD-10-CM

## 2021-09-14 DIAGNOSIS — Z51.5 ENCOUNTER FOR PALLIATIVE CARE: ICD-10-CM

## 2021-09-14 DIAGNOSIS — A41.9 SEPSIS, UNSPECIFIED ORGANISM: ICD-10-CM

## 2021-09-14 LAB
ALBUMIN SERPL ELPH-MCNC: 2.7 G/DL — LOW (ref 3.5–5.2)
ALBUMIN SERPL ELPH-MCNC: 3.5 G/DL — SIGNIFICANT CHANGE UP (ref 3.5–5.2)
ALP SERPL-CCNC: 107 U/L — SIGNIFICANT CHANGE UP (ref 30–115)
ALP SERPL-CCNC: 153 U/L — HIGH (ref 30–115)
ALT FLD-CCNC: 39 U/L — SIGNIFICANT CHANGE UP (ref 0–41)
ALT FLD-CCNC: 55 U/L — HIGH (ref 0–41)
ANION GAP SERPL CALC-SCNC: 12 MMOL/L — SIGNIFICANT CHANGE UP (ref 7–14)
ANION GAP SERPL CALC-SCNC: 14 MMOL/L — SIGNIFICANT CHANGE UP (ref 7–14)
ANION GAP SERPL CALC-SCNC: 19 MMOL/L — HIGH (ref 7–14)
ANION GAP SERPL CALC-SCNC: 19 MMOL/L — HIGH (ref 7–14)
ANISOCYTOSIS BLD QL: SLIGHT — SIGNIFICANT CHANGE UP
AST SERPL-CCNC: 36 U/L — SIGNIFICANT CHANGE UP (ref 0–41)
AST SERPL-CCNC: 52 U/L — HIGH (ref 0–41)
BASOPHILS # BLD AUTO: 0 K/UL — SIGNIFICANT CHANGE UP (ref 0–0.2)
BASOPHILS NFR BLD AUTO: 0 % — SIGNIFICANT CHANGE UP (ref 0–1)
BILIRUB SERPL-MCNC: 0.4 MG/DL — SIGNIFICANT CHANGE UP (ref 0.2–1.2)
BILIRUB SERPL-MCNC: 0.4 MG/DL — SIGNIFICANT CHANGE UP (ref 0.2–1.2)
BUN SERPL-MCNC: 143 MG/DL — CRITICAL HIGH (ref 10–20)
BUN SERPL-MCNC: 143 MG/DL — CRITICAL HIGH (ref 10–20)
BUN SERPL-MCNC: 160 MG/DL — CRITICAL HIGH (ref 10–20)
BUN SERPL-MCNC: 170 MG/DL — CRITICAL HIGH (ref 10–20)
CALCIUM SERPL-MCNC: 7.3 MG/DL — LOW (ref 8.5–10.1)
CALCIUM SERPL-MCNC: 7.4 MG/DL — LOW (ref 8.5–10.1)
CALCIUM SERPL-MCNC: 9.5 MG/DL — SIGNIFICANT CHANGE UP (ref 8.5–10.1)
CALCIUM SERPL-MCNC: 9.9 MG/DL — SIGNIFICANT CHANGE UP (ref 8.5–10.1)
CHLORIDE SERPL-SCNC: 106 MMOL/L — SIGNIFICANT CHANGE UP (ref 98–110)
CHLORIDE SERPL-SCNC: 108 MMOL/L — SIGNIFICANT CHANGE UP (ref 98–110)
CHLORIDE SERPL-SCNC: 90 MMOL/L — LOW (ref 98–110)
CHLORIDE SERPL-SCNC: 91 MMOL/L — LOW (ref 98–110)
CO2 SERPL-SCNC: 16 MMOL/L — LOW (ref 17–32)
CO2 SERPL-SCNC: 19 MMOL/L — SIGNIFICANT CHANGE UP (ref 17–32)
CO2 SERPL-SCNC: 40 MMOL/L — HIGH (ref 17–32)
CO2 SERPL-SCNC: 42 MMOL/L — CRITICAL HIGH (ref 17–32)
CREAT SERPL-MCNC: 5.7 MG/DL — CRITICAL HIGH (ref 0.7–1.5)
CREAT SERPL-MCNC: 5.7 MG/DL — CRITICAL HIGH (ref 0.7–1.5)
CREAT SERPL-MCNC: 6.5 MG/DL — CRITICAL HIGH (ref 0.7–1.5)
CREAT SERPL-MCNC: 7 MG/DL — CRITICAL HIGH (ref 0.7–1.5)
EOSINOPHIL # BLD AUTO: 0 K/UL — SIGNIFICANT CHANGE UP (ref 0–0.7)
EOSINOPHIL NFR BLD AUTO: 0 % — SIGNIFICANT CHANGE UP (ref 0–8)
GIANT PLATELETS BLD QL SMEAR: PRESENT — SIGNIFICANT CHANGE UP
GLUCOSE BLDC GLUCOMTR-MCNC: 113 MG/DL — HIGH (ref 70–99)
GLUCOSE BLDC GLUCOMTR-MCNC: 124 MG/DL — HIGH (ref 70–99)
GLUCOSE BLDC GLUCOMTR-MCNC: 130 MG/DL — HIGH (ref 70–99)
GLUCOSE BLDC GLUCOMTR-MCNC: 97 MG/DL — SIGNIFICANT CHANGE UP (ref 70–99)
GLUCOSE SERPL-MCNC: 113 MG/DL — HIGH (ref 70–99)
GLUCOSE SERPL-MCNC: 1190 MG/DL — CRITICAL HIGH (ref 70–99)
GLUCOSE SERPL-MCNC: 1199 MG/DL — CRITICAL HIGH (ref 70–99)
GLUCOSE SERPL-MCNC: 136 MG/DL — HIGH (ref 70–99)
HCT VFR BLD CALC: 44.8 % — SIGNIFICANT CHANGE UP (ref 37–47)
HGB BLD-MCNC: 14.7 G/DL — SIGNIFICANT CHANGE UP (ref 12–16)
LACTATE SERPL-SCNC: 3 MMOL/L — HIGH (ref 0.7–2)
LYMPHOCYTES # BLD AUTO: 1.75 K/UL — SIGNIFICANT CHANGE UP (ref 1.2–3.4)
LYMPHOCYTES # BLD AUTO: 13 % — LOW (ref 20.5–51.1)
MACROCYTES BLD QL: SLIGHT — SIGNIFICANT CHANGE UP
MAGNESIUM SERPL-MCNC: 2.7 MG/DL — HIGH (ref 1.8–2.4)
MANUAL SMEAR VERIFICATION: SIGNIFICANT CHANGE UP
MCHC RBC-ENTMCNC: 30.8 PG — SIGNIFICANT CHANGE UP (ref 27–31)
MCHC RBC-ENTMCNC: 32.8 G/DL — SIGNIFICANT CHANGE UP (ref 32–37)
MCV RBC AUTO: 93.9 FL — SIGNIFICANT CHANGE UP (ref 81–99)
METAMYELOCYTES # FLD: 2.6 % — HIGH (ref 0–0)
MONOCYTES # BLD AUTO: 1.05 K/UL — HIGH (ref 0.1–0.6)
MONOCYTES NFR BLD AUTO: 7.8 % — SIGNIFICANT CHANGE UP (ref 1.7–9.3)
NEUTROPHILS # BLD AUTO: 10.33 K/UL — HIGH (ref 1.4–6.5)
NEUTROPHILS NFR BLD AUTO: 64.4 % — SIGNIFICANT CHANGE UP (ref 42.2–75.2)
NEUTS BAND # BLD: 12.2 % — HIGH (ref 0–6)
PLAT MORPH BLD: NORMAL — SIGNIFICANT CHANGE UP
PLATELET # BLD AUTO: 271 K/UL — SIGNIFICANT CHANGE UP (ref 130–400)
POIKILOCYTOSIS BLD QL AUTO: SLIGHT — SIGNIFICANT CHANGE UP
POLYCHROMASIA BLD QL SMEAR: SIGNIFICANT CHANGE UP
POTASSIUM SERPL-MCNC: 3.8 MMOL/L — SIGNIFICANT CHANGE UP (ref 3.5–5)
POTASSIUM SERPL-MCNC: 3.9 MMOL/L — SIGNIFICANT CHANGE UP (ref 3.5–5)
POTASSIUM SERPL-MCNC: 4.9 MMOL/L — SIGNIFICANT CHANGE UP (ref 3.5–5)
POTASSIUM SERPL-MCNC: 5.5 MMOL/L — HIGH (ref 3.5–5)
POTASSIUM SERPL-SCNC: 3.8 MMOL/L — SIGNIFICANT CHANGE UP (ref 3.5–5)
POTASSIUM SERPL-SCNC: 3.9 MMOL/L — SIGNIFICANT CHANGE UP (ref 3.5–5)
POTASSIUM SERPL-SCNC: 4.9 MMOL/L — SIGNIFICANT CHANGE UP (ref 3.5–5)
POTASSIUM SERPL-SCNC: 5.5 MMOL/L — HIGH (ref 3.5–5)
PROT SERPL-MCNC: 4.7 G/DL — LOW (ref 6–8)
PROT SERPL-MCNC: 6.7 G/DL — SIGNIFICANT CHANGE UP (ref 6–8)
RBC # BLD: 4.77 M/UL — SIGNIFICANT CHANGE UP (ref 4.2–5.4)
RBC # FLD: 13.4 % — SIGNIFICANT CHANGE UP (ref 11.5–14.5)
RBC BLD AUTO: ABNORMAL
SODIUM SERPL-SCNC: 143 MMOL/L — SIGNIFICANT CHANGE UP (ref 135–146)
SODIUM SERPL-SCNC: 144 MMOL/L — SIGNIFICANT CHANGE UP (ref 135–146)
SODIUM SERPL-SCNC: 144 MMOL/L — SIGNIFICANT CHANGE UP (ref 135–146)
SODIUM SERPL-SCNC: 145 MMOL/L — SIGNIFICANT CHANGE UP (ref 135–146)
WBC # BLD: 13.49 K/UL — HIGH (ref 4.8–10.8)
WBC # FLD AUTO: 13.49 K/UL — HIGH (ref 4.8–10.8)

## 2021-09-14 PROCEDURE — 99497 ADVNCD CARE PLAN 30 MIN: CPT | Mod: 25

## 2021-09-14 PROCEDURE — 99291 CRITICAL CARE FIRST HOUR: CPT

## 2021-09-14 PROCEDURE — 99222 1ST HOSP IP/OBS MODERATE 55: CPT

## 2021-09-14 PROCEDURE — 99223 1ST HOSP IP/OBS HIGH 75: CPT

## 2021-09-14 RX ORDER — HEPARIN SODIUM 5000 [USP'U]/ML
5000 INJECTION INTRAVENOUS; SUBCUTANEOUS EVERY 12 HOURS
Refills: 0 | Status: DISCONTINUED | OUTPATIENT
Start: 2021-09-14 | End: 2021-09-20

## 2021-09-14 RX ORDER — CEFTRIAXONE 500 MG/1
1000 INJECTION, POWDER, FOR SOLUTION INTRAMUSCULAR; INTRAVENOUS EVERY 24 HOURS
Refills: 0 | Status: COMPLETED | OUTPATIENT
Start: 2021-09-14 | End: 2021-09-18

## 2021-09-14 RX ORDER — DEXTROSE 50 % IN WATER 50 %
50 SYRINGE (ML) INTRAVENOUS ONCE
Refills: 0 | Status: COMPLETED | OUTPATIENT
Start: 2021-09-14 | End: 2021-09-14

## 2021-09-14 RX ORDER — INSULIN HUMAN 100 [IU]/ML
10 INJECTION, SOLUTION SUBCUTANEOUS ONCE
Refills: 0 | Status: COMPLETED | OUTPATIENT
Start: 2021-09-14 | End: 2021-09-14

## 2021-09-14 RX ORDER — SODIUM CHLORIDE 9 MG/ML
1000 INJECTION INTRAMUSCULAR; INTRAVENOUS; SUBCUTANEOUS ONCE
Refills: 0 | Status: COMPLETED | OUTPATIENT
Start: 2021-09-14 | End: 2021-09-14

## 2021-09-14 RX ORDER — SODIUM CHLORIDE 9 MG/ML
1000 INJECTION, SOLUTION INTRAVENOUS
Refills: 0 | Status: DISCONTINUED | OUTPATIENT
Start: 2021-09-14 | End: 2021-09-15

## 2021-09-14 RX ORDER — SODIUM BICARBONATE 1 MEQ/ML
0.3 SYRINGE (ML) INTRAVENOUS
Qty: 150 | Refills: 0 | Status: DISCONTINUED | OUTPATIENT
Start: 2021-09-14 | End: 2021-09-14

## 2021-09-14 RX ADMIN — Medication 100 MEQ/KG/HR: at 01:34

## 2021-09-14 RX ADMIN — SODIUM CHLORIDE 100 MILLILITER(S): 9 INJECTION, SOLUTION INTRAVENOUS at 11:49

## 2021-09-14 RX ADMIN — HEPARIN SODIUM 5000 UNIT(S): 5000 INJECTION INTRAVENOUS; SUBCUTANEOUS at 17:04

## 2021-09-14 RX ADMIN — CEFTRIAXONE 100 MILLIGRAM(S): 500 INJECTION, POWDER, FOR SOLUTION INTRAMUSCULAR; INTRAVENOUS at 07:33

## 2021-09-14 RX ADMIN — HEPARIN SODIUM 5000 UNIT(S): 5000 INJECTION INTRAVENOUS; SUBCUTANEOUS at 08:27

## 2021-09-14 RX ADMIN — SODIUM CHLORIDE 1000 MILLILITER(S): 9 INJECTION INTRAMUSCULAR; INTRAVENOUS; SUBCUTANEOUS at 01:45

## 2021-09-14 NOTE — CONSULT NOTE ADULT - ASSESSMENT
89 yo female w/ PMH of pancreatic mass, CAD, HTN presents for unresponsiveness.     Renal consulted for OSMAR with hyperkalemia, met acidosis and hypercalcemia.    #OSMAR - likely prerenal, unknown baseline creat  - good urine output after bicarb drip, now alkalotic  - send UA, Urine Na creat   - obtain kidney and bladder sono  -resume IVF -1/2  cc/hr  -strict Is and OS  - creat is improving    #Metabolic encephalopathy 2/2 acute renal failure and dehydration  #Hyperkalemia with metabolic acidosis  - Bicarb 10, K 8.1, Cr 8, pH 7.27  - s/p insulin and d50 and bicarb gtt  - K normal now after IVF    #Hypercalcemia  - Ca 11.4, elevated protein  - possibly 2/2 malignancy and dehydration  - ordered SPEP and UPEP, ionized Ca, PTHrp, PTH and phos  - calcium is close to normal now   #UTI - UA positive  - s/p cefepime and vanc in ED  - blood and uclx pending  - started on  rocephin     Pt is not a good candidate for RRT, she is DNI/DNI cont medical tx

## 2021-09-14 NOTE — CONSULT NOTE ADULT - ASSESSMENT
IMPRESSION:    Altered MS/ toxic metabolic  Acute on chronic renal failure  severe acidosis  hyperglycemia  pancreatic mass  UTI      PLAN:    CNS: Avoid CNS depressant, head CT    HEENT:  Oral care    PULMONARY:  HOB @ 45 degrees, Aspiration precaution, keep Sao2 88 to 94%, abg    CARDIOVASCULAR: keep bicarb drip ( labs taken prior with bicarb 40 and high BS, probably blood taken near IVF)    GI: GI prophylaxis                                          Feeding if needed NGT    RENAL:  F/u  lytes.  Correct as needed. accurate I/O, Renal us, renal eval    INFECTIOUS DISEASE: IV ABX    HEMATOLOGICAL:  DVT prophylaxis.    ENDOCRINE:  Follow up FS.  Insulin protocol if needed.    SDU    palliative care eval

## 2021-09-14 NOTE — CONSULT NOTE ADULT - PROBLEM SELECTOR RECOMMENDATION 2
advanced  Recommend non-pharmacological interventions to prevent/treat delirium  - maintain day/night light cycles  - optimize sleep-wake cycle, minimize environmental noise  - reorientation frequently  - use verbal redirection as first line  - minimize restraints and lines  - ensure good bladder/bowel function  - ensure adequate pain control  - minimize use of anticholinergic, antihistaminic, and benzodiazepine medications.

## 2021-09-14 NOTE — CHART NOTE - NSCHARTNOTEFT_GEN_A_CORE
89 yo female w/ PMH of pancreatic mass, CAD, HTN presents for unresponsiveness. In the ED, found to acute renal failure/Met acidosis and UTI    Renal following, not a good candidate for RRT  c/w IVF 1/2  cc/hr  f/u urine and flood cultures, c/w abx for now  monitor PO intake, may need NGT

## 2021-09-14 NOTE — PROGRESS NOTE ADULT - SUBJECTIVE AND OBJECTIVE BOX
QUIN HERNADEZ 89y Female  MRN#: [id number]   Hospital Day: 1d    HPI:  89 yo female w/ PMH of pancreatic mass, CAD, HTN presents for unresponsiveness. Unable to provide further HPI due to mental status. Per nursing home NP, baseline mental status is A&Ox1, for past couple weeks a little bit more confused than normal, yesterday started becoming lethargic and minimally responsive.Patient is from [facility] and was DNR/DNI/DNH but due to her worsening condition. Sons were spoken to and initially rescinded the DNR/DNI/DNH but due to patient's clinical condition and poor prognosis, patient was made DNR/DNI.     In the ED, labs were remarkable for K 8.1, bicarb 10, , Cr 8.6 (baseline 1.0 in february), Ca 11.4, protein 8.4, VBG pH 7.27, pCO2 29. UA is positive. EKG is NSR.     Vital Signs Last 24 Hrs  T(C): --  T(F): --  HR: 88 (13 Sep 2021 22:10) (84 - 150)  BP: 106/62 (13 Sep 2021 22:10) (85/49 - 169/104)  BP(mean): 79 (13 Sep 2021 22:10) (63 - 124)  ABP: --  ABP(mean): --  RR: 18 (13 Sep 2021 22:10) (16 - 18)  SpO2: 100% (13 Sep 2021 22:10) (97% - 100%)       (13 Sep 2021 23:57)      SUBJECTIVE  Patient is a 89y old Female who presents with a chief complaint of acute renal failure (14 Sep 2021 06:03)  Currently admitted to medicine with the primary diagnosis of Sepsis secondary to UTI  The patient is unresponsive and only groans in response to touch.  Unable to obtain an ROS history.  + Bowel movement overnight  NPO for now  Has not been out of bed.      INTERVAL HPI AND OVERNIGHT EVENTS:  Patient was examined and seen at bedside. This morning she is resting comfortably in bed and reports no issues or overnight events.    OBJECTIVE  PAST MEDICAL & SURGICAL HISTORY    ALLERGIES:  aspirin (Unknown)  penicillin (Unknown)  shellfish (Unknown)    MEDICATIONS:  STANDING MEDICATIONS  cefTRIAXone   IVPB 1000 milliGRAM(s) IV Intermittent every 24 hours  heparin   Injectable 5000 Unit(s) SubCutaneous every 12 hours    PRN MEDICATIONS      VITAL SIGNS: Last 24 Hours  T(C): 36.1 (14 Sep 2021 01:55), Max: 36.1 (14 Sep 2021 00:10)  T(F): 96.9 (14 Sep 2021 01:55), Max: 97 (14 Sep 2021 00:10)  HR: 82 (14 Sep 2021 01:55) (82 - 150)  BP: 110/56 (14 Sep 2021 01:55) (85/49 - 169/104)  BP(mean): 80 (14 Sep 2021 01:55) (63 - 124)  RR: 19 (14 Sep 2021 01:55) (16 - 19)  SpO2: 99% (14 Sep 2021 01:55) (97% - 100%)    LABS:                        15.1   18.04 )-----------( 396      ( 13 Sep 2021 16:50 )             47.6     09-14    144  |  90<L>  |  143<HH>  ----------------------------<  1190<HH>  3.9   |  40<H>  |  5.7<HH>    Ca    7.4<L>      14 Sep 2021 00:53    TPro  4.7<L>  /  Alb  2.7<L>  /  TBili  0.4  /  DBili  x   /  AST  36  /  ALT  39  /  AlkPhos  107  09-14    PT/INR - ( 13 Sep 2021 16:50 )   PT: 13.70 sec;   INR: 1.19 ratio         PTT - ( 13 Sep 2021 16:50 )  PTT:29.5 sec  Urinalysis Basic - ( 13 Sep 2021 19:20 )    Color: Yellow / Appearance: Turbid / S.017 / pH: x  Gluc: x / Ketone: Negative  / Bili: Negative / Urobili: <2 mg/dL   Blood: x / Protein: 300 mg/dL / Nitrite: Negative   Leuk Esterase: Large / RBC: 2 /HPF / WBC >720 /HPF   Sq Epi: x / Non Sq Epi: 1 /HPF / Bacteria: Moderate      RADIOLOGY:      PHYSICAL EXAM:  CONSTITUTIONAL: No acute distress, well-developed, well-groomed, does not open eyes in response to name or palpation. Unresponsive to questioning  HEAD: Atraumatic, normocephalic  EYES: EOM intact, P Minimally RRL, conjunctiva and sclera clear  ENT: Supple, no masses, no thyromegaly, no bruits, no JVD; moist mucous membranes  PULMONARY: Clear to auscultation bilaterally; no wheezes, rales, or rhonchi  CARDIOVASCULAR: Regular rate and rhythm; no murmurs, rubs, or gallops  GASTROINTESTINAL: Soft, non-tender, non-distended; bowel sounds present  MUSCULOSKELETAL: 2+ peripheral pulses; no clubbing, no cyanosis, no edema  NEUROLOGY: non-focal  SKIN: No rashes or lesions; warm and dry    ASSESSMENT & PLAN  #    PAST MEDICAL & SURGICAL HISTORY:      #Misc  - DVT Prophylaxis:  - Diet:  - GI Prophylaxis:  - Activity:  - IV Fluids:  - Code Status:    Dispo: QUIN HERNADEZ 89y Female  MRN#: [id number]   Hospital Day: 1d    HPI:  89 yo female w/ PMH of pancreatic mass, CAD, HTN presents for unresponsiveness. Unable to provide further HPI due to mental status. Per nursing home NP, baseline mental status is A&Ox1, for past couple weeks a little bit more confused than normal, yesterday started becoming lethargic and minimally responsive.Patient is from [facility] and was DNR/DNI/DNH but due to her worsening condition. Sons were spoken to and initially rescinded the DNR/DNI/DNH but due to patient's clinical condition and poor prognosis, patient was made DNR/DNI.     In the ED, labs were remarkable for K 8.1, bicarb 10, , Cr 8.6 (baseline 1.0 in february), Ca 11.4, protein 8.4, VBG pH 7.27, pCO2 29. UA is positive. EKG is NSR.     Vital Signs Last 24 Hrs  T(C): --  T(F): --  HR: 88 (13 Sep 2021 22:10) (84 - 150)  BP: 106/62 (13 Sep 2021 22:10) (85/49 - 169/104)  BP(mean): 79 (13 Sep 2021 22:10) (63 - 124)  ABP: --  ABP(mean): --  RR: 18 (13 Sep 2021 22:10) (16 - 18)  SpO2: 100% (13 Sep 2021 22:10) (97% - 100%)       (13 Sep 2021 23:57)      SUBJECTIVE  Patient is a 89y old Female who presents with a chief complaint of acute renal failure (14 Sep 2021 06:03)  Currently admitted to medicine with the primary diagnosis of Sepsis secondary to UTI  The patient is unresponsive and only groans in response to touch.  Unable to obtain an ROS history.  + Bowel movement overnight  NPO for now  Has not been out of bed.      INTERVAL HPI AND OVERNIGHT EVENTS:  Patient was examined and seen at bedside. This morning she is resting comfortably in bed and reports no issues or overnight events.    OBJECTIVE  PAST MEDICAL & SURGICAL HISTORY    ALLERGIES:  aspirin (Unknown)  penicillin (Unknown)  shellfish (Unknown)    MEDICATIONS:  STANDING MEDICATIONS  cefTRIAXone   IVPB 1000 milliGRAM(s) IV Intermittent every 24 hours  heparin   Injectable 5000 Unit(s) SubCutaneous every 12 hours    PRN MEDICATIONS      VITAL SIGNS: Last 24 Hours  T(C): 36.1 (14 Sep 2021 01:55), Max: 36.1 (14 Sep 2021 00:10)  T(F): 96.9 (14 Sep 2021 01:55), Max: 97 (14 Sep 2021 00:10)  HR: 82 (14 Sep 2021 01:55) (82 - 150)  BP: 110/56 (14 Sep 2021 01:55) (85/49 - 169/104)  BP(mean): 80 (14 Sep 2021 01:55) (63 - 124)  RR: 19 (14 Sep 2021 01:55) (16 - 19)  SpO2: 99% (14 Sep 2021 01:55) (97% - 100%)    LABS:                        15.1   18.04 )-----------( 396      ( 13 Sep 2021 16:50 )             47.6     09-14    144  |  90<L>  |  143<HH>  ----------------------------<  1190<HH>  3.9   |  40<H>  |  5.7<HH>    Ca    7.4<L>      14 Sep 2021 00:53    TPro  4.7<L>  /  Alb  2.7<L>  /  TBili  0.4  /  DBili  x   /  AST  36  /  ALT  39  /  AlkPhos  107  09-14    PT/INR - ( 13 Sep 2021 16:50 )   PT: 13.70 sec;   INR: 1.19 ratio         PTT - ( 13 Sep 2021 16:50 )  PTT:29.5 sec  Urinalysis Basic - ( 13 Sep 2021 19:20 )    Color: Yellow / Appearance: Turbid / S.017 / pH: x  Gluc: x / Ketone: Negative  / Bili: Negative / Urobili: <2 mg/dL   Blood: x / Protein: 300 mg/dL / Nitrite: Negative   Leuk Esterase: Large / RBC: 2 /HPF / WBC >720 /HPF   Sq Epi: x / Non Sq Epi: 1 /HPF / Bacteria: Moderate      RADIOLOGY: No New Imaging      PHYSICAL EXAM:  CONSTITUTIONAL: No acute distress, well-developed, well-groomed, does not open eyes in response to name or palpation. Unresponsive to questioning  HEAD: Atraumatic, normocephalic  EYES: EOM intact, Pupils equally round but minimally responsive to light, conjunctiva and sclera clear  ENT: Supple, moist mucous membranes  PULMONARY: Clear to auscultation bilaterally; no wheezes, rales, or rhonchi  CARDIOVASCULAR: Regular rate; no murmurs, rubs, or gallops  GASTROINTESTINAL: Soft, groans on light palpation, non-distended; bowel sounds present  MUSCULOSKELETAL: 2+ peripheral pulses; no edema  NEUROLOGY: does not follow commands, ANOx0  SKIN: warm and dry

## 2021-09-14 NOTE — PROGRESS NOTE ADULT - ASSESSMENT
89 yo female w/ PMH of pancreatic mass, CAD, HTN presents for unresponsiveness.     #Metabolic encephalopathy 2/2 acute renal failure  #Hyperkalemia with metabolic acidosis  #Sepsis  - Bicarb 10, K 8.1, Cr 8, pH 7.27 --> doubt HCO3 is actually 40 (will repeat 10)  - s/p insulin and d50 and bicarb gtt  - EKG NSR  - valencia placed in ED  - c/w bicarb gtt 100cc/hr  - Attempted to place NGT multiple times but unable to due to patient's condition so unable to start lokelma  Plan:  - BMP q4h, f/u 1130 and am    - Unable to Kayexalate enema due to patient is having diarrhea  - c/w insulin and d50  - Nephro consulted for possible RRT, and patient is not a good candidate for RRT and recommended medical management  - Spoke to sons and patient is made DNR/DNI  - Palliative care consulted and will f/u am    #Hypercalcemia  - Ca 11.4, elevated protein  - possibly 2/2 malignancy  - ordered SPEP and UPEP, ionized Ca, PTHrp, PTH and phos    #UTI  - UA positive  - s/p cefepime and vanc in ED  - blood and uclx pending  - start rocephin     #DVT ppx: heparin subq  #GI ppx: not indicated  #Diet: NPO  #Dispo: SDU  DNR/DNI 89 yo female w/ PMH of pancreatic mass, CAD, HTN presents for unresponsiveness found to have metabolic acidosis and UTI.     #UTI  #Sepsis Shock: fluid responsive  -BP low in the ED, was bolused (2.5L LR) did not require pressors.  - EKG NSR  - Attempted to place NGT multiple times but unable to due to patient's condition so unable to start lokelma  - Unable to Kayexalate enema due to patient is having diarrhea  - UA positive  - s/p cefepime and vanc in ED  - f/u blood and uclx pending  Plan:  - f/u palliative consult  - c/w Rocephin    #Metabolic encephalopathy 2/2 acute renal failure  #Hyperkalemia with metabolic acidosis  - Bicarb 10, K 8.1, Cr 8, pH 7.27 --> doubt HCO3 is actually 40 (will repeat 10)  - s/p insulin and d50 and bicarb gtt  - valencia placed in ED  - Nephro consulted for possible RRT, and patient is not a good candidate for RRT and recommended medical management  - f/u BMP  - c/w bicarb gtt 100cc/hr    #Hypercalcemia  #h/o pancreatic mass  - Ca 11.4, elevated protein  - possibly 2/2 malignancy  - ordered SPEP and UPEP, ionized Ca, PTHrp, PTH and phos    #Nutrition  -patient is not appropriate for PO intake, NPO for now.    #DVT ppx: heparin subq  #GI ppx: not indicated  #Diet: NPO  #Dispo: SDU  Code: DNR/DNI

## 2021-09-14 NOTE — CONSULT NOTE ADULT - SUBJECTIVE AND OBJECTIVE BOX
Patient is a 89y old  Female who presents with a chief complaint of altered MS (13 Sep 2021 23:57)      HPI:  89 yo female w/ PMH of pancreatic mass, CAD, HTN presents for unresponsiveness. Unable to provide further HPI due to mental status. Per nursing home NP, baseline mental status is A&Ox1, for past couple weeks a little bit more confused than normal, yesterday started becoming lethargic and minimally responsive.Patient is from [facility] and was DNR/DNI but due to her worsening condition. Sons were spoken to and initially rescinded the DNR/DNI/DNH but due to patient's clinical condition and poor prognosis, patient was made DNR/DNI.     In the ED, labs were remarkable for K 8.1, bicarb 10, , Cr 8.6 (baseline 1.0 in february), Ca 11.4, protein 8.4, VBG pH 7.27, pCO2 29. UA is positive. EKG is NSR.   was started on bicarb drip, admitted to SDU       (13 Sep 2021 23:57)      PAST MEDICAL & SURGICAL HISTORY:      SOCIAL HX:   Smoking   UTO    FAMILY HISTORY: UTO      REVIEW OF SYSTEMS UTO      Allergies    aspirin (Unknown)  penicillin (Unknown)  shellfish (Unknown)    Intolerances        cefTRIAXone   IVPB 1000 milliGRAM(s) IV Intermittent every 24 hours  heparin   Injectable 5000 Unit(s) SubCutaneous every 12 hours  : Home Meds:      PHYSICAL EXAM    ICU Vital Signs Last 24 Hrs  T(C): 36.1 (14 Sep 2021 01:55), Max: 36.1 (14 Sep 2021 00:10)  T(F): 96.9 (14 Sep 2021 01:55), Max: 97 (14 Sep 2021 00:10)  HR: 82 (14 Sep 2021 01:55) (82 - 150)  BP: 110/56 (14 Sep 2021 01:55) (85/49 - 169/104)  BP(mean): 80 (14 Sep 2021 01:55) (63 - 124)  RR: 19 (14 Sep 2021 01:55) (16 - 19)  SpO2: 99% (14 Sep 2021 01:55) (97% - 100%)      General: ill looking  HEENT:  NUSRAT  , dry oral mucosa           Lymph Nodes: No cervical LN   Lungs: dec both bases  Cardiovascular: THERESA 3/6  Abdomen: Soft, Positive BS  Extremities: No clubbing  Skin: Warm  Neurological: Non focal, not following commands         LABS:                          15.1   18.04 )-----------( 396      ( 13 Sep 2021 16:50 )             47.6                                                   144  |  90<L>  |  143<HH>  ----------------------------<  1190<HH>  3.9   |  40<H>  |  5.7<HH>    Ca    7.4<L>      14 Sep 2021 00:53    TPro  4.7<L>  /  Alb  2.7<L>  /  TBili  0.4  /  DBili  x   /  AST  36  /  ALT  39  /  AlkPhos  107  14      PT/INR - ( 13 Sep 2021 16:50 )   PT: 13.70 sec;   INR: 1.19 ratio         PTT - ( 13 Sep 2021 16:50 )  PTT:29.5 sec                                       Urinalysis Basic - ( 13 Sep 2021 19:20 )    Color: Yellow / Appearance: Turbid / S.017 / pH: x  Gluc: x / Ketone: Negative  / Bili: Negative / Urobili: <2 mg/dL   Blood: x / Protein: 300 mg/dL / Nitrite: Negative   Leuk Esterase: Large / RBC: 2 /HPF / WBC >720 /HPF   Sq Epi: x / Non Sq Epi: 1 /HPF / Bacteria: Moderate                                                  LIVER FUNCTIONS - ( 14 Sep 2021 00:53 )  Alb: 2.7 g/dL / Pro: 4.7 g/dL / ALK PHOS: 107 U/L / ALT: 39 U/L / AST: 36 U/L / GGT: x                                                                                             MEDICATIONS  (STANDING):  cefTRIAXone   IVPB 1000 milliGRAM(s) IV Intermittent every 24 hours  heparin   Injectable 5000 Unit(s) SubCutaneous every 12 hours    MEDICATIONS  (PRN):

## 2021-09-14 NOTE — CONSULT NOTE ADULT - SUBJECTIVE AND OBJECTIVE BOX
NEPHROLOGY CONSULTATION NOTE    89 yo female w/ PMH of pancreatic mass, CAD, HTN presents for unresponsiveness. Unable to provide further HPI due to mental status. Per nursing home NP, baseline mental status is A&Ox1, for past couple weeks a little bit more confused than normal, yesterday started becoming lethargic and minimally responsive. Patient is from [facility] and was DNR/DNI/DNH but due to her worsening condition. Sons were spoken to and initially rescinded the DNR/DNI/DNH but due to patient's clinical condition and poor prognosis, patient was made DNR/DNI.     In the ED, labs were remarkable for K 8.1, bicarb 10, , Cr 8.6 (baseline 1.0 in february), Ca 11.4, protein 8.4, VBG pH 7.27, pCO2 29. UA is positive. EKG is NSR.   Started on Bciarb drip, which was dc/ed this am. Making a lot of urine.     PAST MEDICAL & SURGICAL HISTORY:    Allergies:  aspirin (Unknown)  penicillin (Unknown)  shellfish (Unknown)    Home Medications Reviewed  Hospital Medications:   MEDICATIONS  (STANDING):  cefTRIAXone   IVPB 1000 milliGRAM(s) IV Intermittent every 24 hours  heparin   Injectable 5000 Unit(s) SubCutaneous every 12 hours  sodium chloride 0.45%. 1000 milliLiter(s) (100 mL/Hr) IV Continuous <Continuous>      SOCIAL HISTORY:  Denies ETOH,Smoking,   FAMILY HISTORY:    Yes        REVIEW OF SYSTEMS:  Lethargic, unable to obtain.    VITALS:  T(F): 96 (21 @ 08:08), Max: 97 (21 @ 00:10)  HR: 77 (21 @ 08:08)  BP: 95/53 (21 @ 08:08)  RR: 20 (21 @ 08:08)  SpO2: 100% (21 @ 08:08)      Weight (kg): 49.895 ( @ 16:03)    I&O's Detail        PHYSICAL EXAM:  Constitutional: NAD  HEENT: anicteric sclera,   Neck: No JVD  Respiratory: CTA  Cardiovascular: S1, S2, RRR  Gastrointestinal: BS+, soft, NT/ND  Extremities: No cyanosis or clubbing. No peripheral edema  Neurological: Lethargic, responding to pain  Psychiatric: Normal mood, normal affect  : Has valencai.   Skin: No rashes  Vascular Access:    LABS:      144  |  90<L>  |  143<HH>  ----------------------------<  1190<HH>  3.9   |  40<H>  |  5.7<HH>    Ca    7.4<L>      14 Sep 2021 00:53    TPro  4.7<L>  /  Alb  2.7<L>  /  TBili  0.4  /  DBili      /  AST  36  /  ALT  39  /  AlkPhos  107      Creatinine Trend: 5.7 <--, 5.7 <--, 8.6 <--                        15.1   18.04 )-----------( 396      ( 13 Sep 2021 16:50 )             47.6     Urine Studies:  Urinalysis Basic - ( 13 Sep 2021 19:20 )    Color: Yellow / Appearance: Turbid / S.017 / pH:   Gluc:  / Ketone: Negative  / Bili: Negative / Urobili: <2 mg/dL   Blood:  / Protein: 300 mg/dL / Nitrite: Negative   Leuk Esterase: Large / RBC: 2 /HPF / WBC >720 /HPF   Sq Epi:  / Non Sq Epi: 1 /HPF / Bacteria: Moderate                RADIOLOGY & ADDITIONAL STUDIES:

## 2021-09-14 NOTE — PHARMACOTHERAPY INTERVENTION NOTE - COMMENTS
s/w md 9141- as per chart pt has pcn allergy- as per md pt tolerated cefepime just fine & there is low cross-reactivity with rocephin & pcn so ok to continue
s/w md 5686- 2 active orders for bicarb drips with 2 different rates- recommended to d/c one order for 250ml/hr

## 2021-09-14 NOTE — CONSULT NOTE ADULT - CONVERSATION DETAILS
Spoke with son Jesus over the phone on 9/13.  Jesus noted patient never wanted to be intubated, or kept alive on life support, feeding tubes, etc. She was doing well at NH, and had a DNR/I/do not rehospitalize order on her MOLST. Her family however did decide to have her hospitalized out of concern for her abrupt change in mental status.     Patient's other son, Musa, strongly feels the patient should be Full Code and everything should be done to keep her alive, based on Jew beliefs. Due to his change in beliefs, family revoked the DNR/DNI this hospitalization. Delta (HCP) and his wife would not like the patient intubated but include Musa in decision making. Encouraged them to have a further conversation with Musa about honoring the patient's wishes to not be intubated or kept on life support. Discussed that CPR and intubation would very likely prove to be more burdensome than therapeutic in a patient of her age/comorbidities. Provided family with palliative.    Later in day, after further discussion with primary team, patient was made DNR/DNI by family.

## 2021-09-14 NOTE — CONSULT NOTE ADULT - PROBLEM SELECTOR RECOMMENDATION 3
GOC discussed yesterday- wish for med management/ DNR/I in place  will follow and remain available to revisit PRN

## 2021-09-14 NOTE — CONSULT NOTE ADULT - ASSESSMENT
89[facility] from [facility] with [facility]MS was A&Ox1 at baseline being treated for urosepsis being evaluated for GOC. Yesterday, had extensive discussion with family. Son Jesus is HCP. Overnight decision made DNR/I    No symptoms to address at present       MEDD (morphine equivalent daily dose): na      See Recs below. D/W primary and palliative teams    Please call [phone] with questions or concerns 24/7.   We will continue to follow.    89[facility] was A&Ox1 at baseline being treated for urosepsis being evaluated for GOC. Yesterday, had extensive discussion with family. Son Jesus is HCP. Overnight decision made DNR/I with ongoing medical management.    No symptoms to address at present       MEDD (morphine equivalent daily dose): na      See Recs below. D/W primary and palliative teams    Please call [phone] with questions or concerns 24/7.   We will continue to follow.

## 2021-09-14 NOTE — CONSULT NOTE ADULT - SUBJECTIVE AND OBJECTIVE BOX
CECI BRAR          MRN-[id number]              HPI:  89 yo female w/ PMH of pancreatic mass, CAD, HTN presents for unresponsiveness. Unable to provide further HPI due to mental status. Per nursing home NP, baseline mental status is A&Ox1, for past couple weeks a little bit more confused than normal, yesterday started becoming lethargic and minimally responsive.Patient is from [facility] and was DNR/DNI/DNH but due to her worsening condition. Sons were spoken to and initially rescinded the DNR/DNI/DNH but due to patient's clinical condition and poor prognosis, patient was made DNR/DNI.     In the ED, labs were remarkable for K 8.1, bicarb 10, , Cr 8.6 (baseline 1.0 in february), Ca 11.4, protein 8.4, VBG pH 7.27, pCO2 29. UA is positive. EKG is NSR.     Vital Signs Last 24 Hrs  T(C): --  T(F): --  HR: 88 (13 Sep 2021 22:10) (84 - 150)  BP: 106/62 (13 Sep 2021 22:10) (85/49 - 169/104)  BP(mean): 79 (13 Sep 2021 22:10) (63 - 124)  ABP: --  ABP(mean): --  RR: 18 (13 Sep 2021 22:10) (16 - 18)  SpO2: 100% (13 Sep 2021 22:10) (97% - 100%)       (13 Sep 2021 23:57)      PAST MEDICAL & SURGICAL HISTORY:      FAMILY HISTORY:   Reviewed and found non contributory in mother or father    SOCIAL HISTORY:  Tobacco/etoh/illicit drug use use reported. Yes [ ]  _________  No [ ]  Pt resides at: home [ ]  facility [ ]  other [ ] _______      ROS:	    Unable to attain due to:  unable to engage in exam                     Last BM: unknown      Allergies    aspirin (Unknown)  penicillin (Unknown)  shellfish (Unknown)    Intolerances      Opiate Naive (Y/N): y  -iStop reviewed (Y/N): y  Ref#: Reference #: [id number]    Others' Prescriptions  Patient Name: Ceci BrarBirth Date: 1931  Address: [address] [place] [id number]: Female  Rx Written	Rx Dispensed	Drug	Quantity	Days Supply	Prescriber Name	Prescriber Tamika #	Payment Method	Dispenser  09/10/2021	09/10/2021	clonazepam 1 mg tablet	60	30	Yarelis Morgan	[id number]	Insurance	[employer]  2021	clonazepam 0.5 mg tablet	30	30	GermánmarissaLucia (NP)	[id number]	Insurance	[employer]  2021	clonazepam 0.5 mg tablet	30	30	Yarelis Morgan	[id number]	Insurance	[employer]  2021	clonazepam 0.5 mg tablet	30	30	Yarelis Morgan	[id number]	Insurance	[employer]  2021	tramadol hcl 50 mg tablet	7	7	Akash Reardon MD	[id number]	Insurance	[employer]                Labs:	    CBC:                        15.1   18.04 )-----------( 396      ( 13 Sep 2021 16:50 )             47.6     CMP:        144  |  90<L>  |  143<HH>  ----------------------------<  1190<HH>  3.9   |  40<H>  |  5.7<HH>    Ca    7.4<L>      14 Sep 2021 00:53    TPro  4.7<L>  /  Alb  2.7<L>  /  TBili  0.4  /  DBili  x   /  AST  36  /  ALT  39  /  AlkPhos  107         PT/INR - ( 13 Sep 2021 16:50 )   PT: 13.70 sec;   INR: 1.19 ratio         PTT - ( 13 Sep 2021 16:50 )  PTT:29.5 sec     Urinalysis Basic - ( 13 Sep 2021 19:20 )    Color: Yellow / Appearance: Turbid / S.017 / pH: x  Gluc: x / Ketone: Negative  / Bili: Negative / Urobili: <2 mg/dL   Blood: x / Protein: 300 mg/dL / Nitrite: Negative   Leuk Esterase: Large / RBC: 2 /HPF / WBC >720 /HPF   Sq Epi: x / Non Sq Epi: 1 /HPF / Bacteria: Moderate          Radiology:	       EK Lead ECG:   Ventricular Rate 89 BPM    Atrial Rate 89 BPM    P-R Interval 142 ms    QRS Duration 84 ms    Q-T Interval 340 ms    QTC Calculation(Bazett) 413 ms    P Axis 58 degrees    R Axis -1 degrees    T Axis 54 degrees    Diagnosis Line Normal sinus rhythm  Normal ECG    Confirmed by PAULINA MEJIA MD ([id number]) on 2021 10:59:38 PM (21 @ 17:12)      Imaging Personally Reviewed:  [ ] YES  [ ] NO    Consultant(s) Notes Reviewed:  [ ] YES  [ ] NO  Care Discussed with Consultants/Other Providers [ ] YES  [ ] NO    PEx:	  T(C): 35.6 (21 @ 08:08), Max: 36.1 (21 @ 00:10)  HR: 77 (21 @ 08:08) (77 - 150)  BP: 95/53 (21 @ 08:08) (85/49 - 169/104)  RR: 20 (21 @ 08:08) (16 - 20)  SpO2: 100% (21 @ 08:08) (97% - 100%)  Wt(kg): --  Daily     Daily     General:  found in bed in NAD  Eyes:  PERRL EOMI Non icteric MOM  ENMT: no external oral ulcers, MMM, no thrush   CVS: RR S1S2 No M/G/R  Resp: Unlabored Non tachypneic No increased WOB  GI:  Soft NT ND BS+  :  Voiding / Shankar / PrimaFit  Musc: No C/C/E    Neuro: Follows commands No focal deficits  Psych: Calm Pleasant, AAOx3    Skin: Non jaundiced , no rash   Lymph: no adenopathy     Preadmit Karnofsky:  %           Current Karnofsky:    20 %  http://www.npcrc.org/files/news/karnofsky_performance_scale.pdf   http://www.npcrc.org/files/news/palliative_performance_scale_PPSv2.pdf  Cachexia (Y/N): y   BMI: n/a      Medications:	      MEDICATIONS  (STANDING):  cefTRIAXone   IVPB 1000 milliGRAM(s) IV Intermittent every 24 hours  heparin   Injectable 5000 Unit(s) SubCutaneous every 12 hours  sodium chloride 0.45%. 1000 milliLiter(s) (100 mL/Hr) IV Continuous <Continuous>    MEDICATIONS  (PRN):        Advanced Directives:	     DNR/DNI      Decision maker: The patient is unable to participate in complex medical decision making conversations.   Legal surrogate:    GOALS OF CARE DISCUSSION	       See previous Palliative Medicine Note yesterday    PSYCHOSOCIAL-SPIRITUAL ASSESSMENT:       See Palliative Care SW/ documentation        	    REFERRALS       Palliative Med        Unit SW/Case Mgmt              Hospice       Speech/Swallow       Nutrition       PT/OT CECI BRAR          [id number]              HPI:  89 yo female w/ PMH of pancreatic mass, CAD, HTN presents for unresponsiveness. Unable to provide further HPI due to mental status. Per nursing home NP, baseline mental status is A&Ox1, for past couple weeks a little bit more confused than normal, yesterday started becoming lethargic and minimally responsive.Patient is from [facility] and was DNR/DNI/DNH but due to her worsening condition. Sons were spoken to and initially rescinded the DNR/DNI/DNH but due to patient's clinical condition and poor prognosis, patient was made DNR/DNI.     In the ED, labs were remarkable for K 8.1, bicarb 10, , Cr 8.6 (baseline 1.0 in february), Ca 11.4, protein 8.4, VBG pH 7.27, pCO2 29. UA is positive. EKG is NSR.     Vital Signs Last 24 Hrs  T(C): --  T(F): --  HR: 88 (13 Sep 2021 22:10) (84 - 150)  BP: 106/62 (13 Sep 2021 22:10) (85/49 - 169/104)  BP(mean): 79 (13 Sep 2021 22:10) (63 - 124)  ABP: --  ABP(mean): --  RR: 18 (13 Sep 2021 22:10) (16 - 18)  SpO2: 100% (13 Sep 2021 22:10) (97% - 100%)       (13 Sep 2021 23:57)      PAST MEDICAL & SURGICAL HISTORY:      FAMILY HISTORY:   Reviewed and found non contributory in mother or father    SOCIAL HISTORY:  Tobacco/etoh/illicit drug use use reported. Yes [ ]  _________  No [x ]  Pt resides at: home [ ]  facility [ x]  other [ ] _______      ROS:	    Unable to attain due to:  unable to engage in exam                     Last BM: unknown      Allergies  aspirin (Unknown)  penicillin (Unknown)  shellfish (Unknown)    Intolerances      Opiate Naive (Y/N): y  -iStop reviewed (Y/N): y  Ref#: Reference #: [id number]    Others' Prescriptions  Patient Name: Ceci BrarBirth Date: 1931  Address: [address] [place], [place] [address]Sex: Female  Rx Written	Rx Dispensed	Drug	Quantity	Days Supply	Prescriber Name	Prescriber Tamika #	Payment Method	Dispenser  09/10/2021	09/10/2021	clonazepam 1 mg tablet	60	30	Yarelis Morgan	[id number]	Insurance	[employer]  2021	clonazepam 0.5 mg tablet	30	30	Lucia Edwards (NP)	[id number]	Insurance	[employer]  2021	clonazepam 0.5 mg tablet	30	30	aYrelis Morgan	[id number]	Insurance	[employer]  2021	clonazepam 0.5 mg tablet	30	30	Yarelis Morgan	[id number]	Insurance	[employer]  2021	tramadol hcl 50 mg tablet	7	7	Akash Reardon MD	[id number]	Insurance	[employer]              Labs:	    CBC:                        15.1   18.04 )-----------( 396      ( 13 Sep 2021 16:50 )             47.6     CMP:        144  |  90<L>  |  143<HH>  ----------------------------<  1190<HH>  3.9   |  40<H>  |  5.7<HH>    Ca    7.4<L>      14 Sep 2021 00:53    TPro  4.7<L>  /  Alb  2.7<L>  /  TBili  0.4  /  DBili  x   /  AST  36  /  ALT  39  /  AlkPhos  107  -14       PT/INR - ( 13 Sep 2021 16:50 )   PT: 13.70 sec;   INR: 1.19 ratio         PTT - ( 13 Sep 2021 16:50 )  PTT:29.5 sec     Urinalysis Basic - ( 13 Sep 2021 19:20 )    Color: Yellow / Appearance: Turbid / S.017 / pH: x  Gluc: x / Ketone: Negative  / Bili: Negative / Urobili: <2 mg/dL   Blood: x / Protein: 300 mg/dL / Nitrite: Negative   Leuk Esterase: Large / RBC: 2 /HPF / WBC >720 /HPF   Sq Epi: x / Non Sq Epi: 1 /HPF / Bacteria: Moderate    Radiology:	       EK Lead ECG:   Ventricular Rate 89 BPM    Atrial Rate 89 BPM    P-R Interval 142 ms    QRS Duration 84 ms    Q-T Interval 340 ms    QTC Calculation(Bazett) 413 ms    P Axis 58 degrees    R Axis -1 degrees    T Axis 54 degrees    Diagnosis Line Normal sinus rhythm  Normal ECG    Confirmed by PAULINA MEJIA MD ([id number]) on 2021 10:59:38 PM (21 @ 17:12)      Imaging Personally Reviewed:  [ ] YES  [ ] NO    Consultant(s) Notes Reviewed:  [ ] YES  [ ] NO  Care Discussed with Consultants/Other Providers [ ] YES  [ ] NO    PEx:	  T(C): 35.6 (21 @ 08:08), Max: 36.1 (21 @ 00:10)  HR: 77 (21 @ 08:08) (77 - 150)  BP: 95/53 (21 @ 08:08) (85/49 - 169/104)  RR: 20 (21 @ 08:08) (16 - 20)  SpO2: 100% (21 @ 08:08) (97% - 100%)  Wt(kg): --  Daily     Daily     General:  found in bed in NAD  Eyes:  PERRL EOMI Non icteric MOM  ENMT: no external oral ulcers, MMM, no thrush   CVS: RR S1S2 No M/G/R  Resp: Unlabored Non tachypneic No increased WOB  GI:  Soft NT ND BS+  :  Voiding / Shankar / PrimaFit  Musc: No C/C/E    Neuro: Follows commands No focal deficits  Psych: Calm Pleasant, AAOx3    Skin: Non jaundiced , no rash   Lymph: no adenopathy     Preadmit Karnofsky:  %           Current Karnofsky:    20 %  http://www.npcrc.org/files/news/karnofsky_performance_scale.pdf   http://www.npcrc.org/files/news/palliative_performance_scale_PPSv2.pdf  Cachexia (Y/N): y   BMI: n/a      Medications:	      MEDICATIONS  (STANDING):  cefTRIAXone   IVPB 1000 milliGRAM(s) IV Intermittent every 24 hours  heparin   Injectable 5000 Unit(s) SubCutaneous every 12 hours  sodium chloride 0.45%. 1000 milliLiter(s) (100 mL/Hr) IV Continuous <Continuous>    MEDICATIONS  (PRN):        Advanced Directives:	     DNR/DNI      Decision maker: The patient is unable to participate in complex medical decision making conversations.   Legal surrogate:    GOALS OF CARE DISCUSSION	       See previous Palliative Medicine Note yesterday    PSYCHOSOCIAL-SPIRITUAL ASSESSMENT:       See Palliative Care SW/ documentation        	    REFERRALS       Palliative Med        Unit SW/Case Mgmt              Hospice       Speech/Swallow       Nutrition       PT/OT CECI BRAR          [id number]              HPI:  89 yo female w/ PMH of pancreatic mass, CAD, HTN presents for unresponsiveness. Unable to provide further HPI due to mental status. Per nursing home NP, baseline mental status is A&Ox1, for past couple weeks a little bit more confused than normal, yesterday started becoming lethargic and minimally responsive.Patient is from [facility] and was DNR/DNI/DNH but due to her worsening condition. Sons were spoken to and initially rescinded the DNR/DNI/DNH but due to patient's clinical condition and poor prognosis, patient was made DNR/DNI.     In the ED, labs were remarkable for K 8.1, bicarb 10, , Cr 8.6 (baseline 1.0 in february), Ca 11.4, protein 8.4, VBG pH 7.27, pCO2 29. UA is positive. EKG is NSR.     Vital Signs Last 24 Hrs  T(C): --  T(F): --  HR: 88 (13 Sep 2021 22:10) (84 - 150)  BP: 106/62 (13 Sep 2021 22:10) (85/49 - 169/104)  BP(mean): 79 (13 Sep 2021 22:10) (63 - 124)  ABP: --  ABP(mean): --  RR: 18 (13 Sep 2021 22:10) (16 - 18)  SpO2: 100% (13 Sep 2021 22:10) (97% - 100%)       (13 Sep 2021 23:57)      PAST MEDICAL & SURGICAL HISTORY:      FAMILY HISTORY:   Reviewed and found non contributory in mother or father    SOCIAL HISTORY:  Tobacco/etoh/illicit drug use use reported. Yes [ ]  _________  No [x ]  Pt resides at: home [ ]  facility [ x]  other [ ] _______      ROS:	    Unable to attain due to:  unable to engage in exam                     Last BM: unknown      Allergies  aspirin (Unknown)  penicillin (Unknown)  shellfish (Unknown)    Intolerances      Opiate Naive (Y/N): y  -iStop reviewed (Y/N): y  Ref#: Reference #: [id number]    Others' Prescriptions  Patient Name: Ceci BrarBirth Date: 1931  Address: [address] [place], [place] [address]Sex: Female  Rx Written	Rx Dispensed	Drug	Quantity	Days Supply	Prescriber Name	Prescriber Tamika #	Payment Method	Dispenser  09/10/2021	09/10/2021	clonazepam 1 mg tablet	60	30	Yarelis Morgan	[id number]	Insurance	[employer]  2021	clonazepam 0.5 mg tablet	30	30	Lucia Edwards (NP)	[id number]	Insurance	[employer]  2021	clonazepam 0.5 mg tablet	30	30	Yarelis Morgan	[id number]	Insurance	[employer]  2021	clonazepam 0.5 mg tablet	30	30	Yarelis Morgan	[id number]	Insurance	[employer]  2021	tramadol hcl 50 mg tablet	7	7	Akash Reardon MD	[id number]	Insurance	[employer]              Labs:	    CBC:                        15.1   18.04 )-----------( 396      ( 13 Sep 2021 16:50 )             47.6     CMP:        144  |  90<L>  |  143<HH>  ----------------------------<  1190<HH>  3.9   |  40<H>  |  5.7<HH>    Ca    7.4<L>      14 Sep 2021 00:53    TPro  4.7<L>  /  Alb  2.7<L>  /  TBili  0.4  /  DBili  x   /  AST  36  /  ALT  39  /  AlkPhos  107  -14       PT/INR - ( 13 Sep 2021 16:50 )   PT: 13.70 sec;   INR: 1.19 ratio         PTT - ( 13 Sep 2021 16:50 )  PTT:29.5 sec     Urinalysis Basic - ( 13 Sep 2021 19:20 )    Color: Yellow / Appearance: Turbid / S.017 / pH: x  Gluc: x / Ketone: Negative  / Bili: Negative / Urobili: <2 mg/dL   Blood: x / Protein: 300 mg/dL / Nitrite: Negative   Leuk Esterase: Large / RBC: 2 /HPF / WBC >720 /HPF   Sq Epi: x / Non Sq Epi: 1 /HPF / Bacteria: Moderate    Radiology:	   No imaging this admission    EK Lead ECG:   Ventricular Rate 89 BPM    Atrial Rate 89 BPM    P-R Interval 142 ms    QRS Duration 84 ms    Q-T Interval 340 ms    QTC Calculation(Bazett) 413 ms    P Axis 58 degrees    R Axis -1 degrees    T Axis 54 degrees    Diagnosis Line Normal sinus rhythm  Normal ECG    Confirmed by PAULINA MEJIA MD ([id number]) on 2021 10:59:38 PM (21 @ 17:12)      Imaging Personally Reviewed:  [x] YES  [ ] NO    Consultant(s) Notes Reviewed:  [x] YES  [ ] NO  Care Discussed with Consultants/Other Providers [x] YES  [ ] NO    PEx:	  T(C): 35.6 (21 @ 08:08), Max: 36.1 (21 @ 00:10)  HR: 77 (21 @ 08:08) (77 - 150)  BP: 95/53 (21 @ 08:08) (85/49 - 169/104)  RR: 20 (21 @ 08:08) (16 - 20)  SpO2: 100% (21 @ 08:08) (97% - 100%)  Wt(kg): --  Daily     Daily     General:  found in bed in NAD  Eyes:  PERRL EOMI Non icteric MOM  ENMT: no external oral ulcers, MMM, no thrush   CVS: RR S1S2 No M/G/R  Resp: Unlabored Non tachypneic No increased WOB  GI:  Soft NT ND BS+  :  Voiding / Shankar / PrimaFit  Musc: No C/C/E    Neuro: Follows commands No focal deficits  Psych: Calm Pleasant, AAOx3    Skin: Non jaundiced , no rash   Lymph: no adenopathy     Preadmit Karnofsky:  Unknown%           Current Karnofsky:    20 %  http://www.npcrc.org/files/news/karnofsky_performance_scale.pdf   http://www.npcrc.org/files/news/palliative_performance_scale_PPSv2.pdf  Cachexia (Y/N): y   BMI: n/a      Medications:	      MEDICATIONS  (STANDING):  cefTRIAXone   IVPB 1000 milliGRAM(s) IV Intermittent every 24 hours  heparin   Injectable 5000 Unit(s) SubCutaneous every 12 hours  sodium chloride 0.45%. 1000 milliLiter(s) (100 mL/Hr) IV Continuous <Continuous>    MEDICATIONS  (PRN):        Advanced Directives:	     DNR/DNI      Decision maker: The patient is unable to participate in complex medical decision making conversations.   Legal surrogate:  Rowan Lee and Musa    GOALS OF CARE DISCUSSION	       See previous Palliative Medicine Note yesterday    PSYCHOSOCIAL-SPIRITUAL ASSESSMENT:       See Palliative Care SW/ documentation        	    REFERRALS       Palliative Med        Unit SW/Case Mgmt

## 2021-09-14 NOTE — CONSULT NOTE ADULT - ATTENDING COMMENTS
Patient seen at bedside. She was moaning and did not participate in exam  Yesterday, spoke with son Jesus over the phone.  At first, patient was Full code, but he later made the patient DNR/DNI with the primary team  Palliative care available to discuss GOC as appropriate.

## 2021-09-14 NOTE — PROGRESS NOTE ADULT - ATTENDING COMMENTS
89/F with pancreatic mass, CAD, HTN presents for unresponsiveness. Unable to provide further HPI due to mental status. Per nursing home NP, baseline mental status is A&Ox1, for past couple weeks a little bit more confused than normal, yesterday started becoming lethargic and minimally responsive.Patient is from [facility] and was DNR/DNI/DNH but due to her worsening condition. Sons were spoken to and initially rescinded the DNR/DNI/DNH but due to patient's clinical condition and poor prognosis, patient was made DNR/DNI. note was accidently signed for 9/14 instead of 9/15.   no changes made.

## 2021-09-15 DIAGNOSIS — E87.2 ACIDOSIS: ICD-10-CM

## 2021-09-15 DIAGNOSIS — N17.9 ACUTE KIDNEY FAILURE, UNSPECIFIED: ICD-10-CM

## 2021-09-15 DIAGNOSIS — R53.2 FUNCTIONAL QUADRIPLEGIA: ICD-10-CM

## 2021-09-15 DIAGNOSIS — G93.41 METABOLIC ENCEPHALOPATHY: ICD-10-CM

## 2021-09-15 LAB
ALBUMIN SERPL ELPH-MCNC: 3 G/DL — LOW (ref 3.5–5.2)
ALBUMIN SERPL ELPH-MCNC: 3.1 G/DL — LOW (ref 3.5–5.2)
ALP SERPL-CCNC: 112 U/L — SIGNIFICANT CHANGE UP (ref 30–115)
ALP SERPL-CCNC: 132 U/L — HIGH (ref 30–115)
ALT FLD-CCNC: 40 U/L — SIGNIFICANT CHANGE UP (ref 0–41)
ALT FLD-CCNC: 50 U/L — HIGH (ref 0–41)
ANION GAP SERPL CALC-SCNC: 15 MMOL/L — HIGH (ref 7–14)
ANION GAP SERPL CALC-SCNC: 20 MMOL/L — HIGH (ref 7–14)
AST SERPL-CCNC: 35 U/L — SIGNIFICANT CHANGE UP (ref 0–41)
AST SERPL-CCNC: 62 U/L — HIGH (ref 0–41)
BASOPHILS # BLD AUTO: 0.02 K/UL — SIGNIFICANT CHANGE UP (ref 0–0.2)
BASOPHILS NFR BLD AUTO: 0.1 % — SIGNIFICANT CHANGE UP (ref 0–1)
BILIRUB SERPL-MCNC: 0.2 MG/DL — SIGNIFICANT CHANGE UP (ref 0.2–1.2)
BILIRUB SERPL-MCNC: 0.3 MG/DL — SIGNIFICANT CHANGE UP (ref 0.2–1.2)
BUN SERPL-MCNC: 165 MG/DL — CRITICAL HIGH (ref 10–20)
BUN SERPL-MCNC: 170 MG/DL — CRITICAL HIGH (ref 10–20)
CALCIUM SERPL-MCNC: 8.9 MG/DL — SIGNIFICANT CHANGE UP (ref 8.5–10.1)
CALCIUM SERPL-MCNC: 9.4 MG/DL — SIGNIFICANT CHANGE UP (ref 8.5–10.1)
CHLORIDE SERPL-SCNC: 108 MMOL/L — SIGNIFICANT CHANGE UP (ref 98–110)
CHLORIDE SERPL-SCNC: 110 MMOL/L — SIGNIFICANT CHANGE UP (ref 98–110)
CO2 SERPL-SCNC: 13 MMOL/L — LOW (ref 17–32)
CO2 SERPL-SCNC: 17 MMOL/L — SIGNIFICANT CHANGE UP (ref 17–32)
CREAT SERPL-MCNC: 5.5 MG/DL — CRITICAL HIGH (ref 0.7–1.5)
CREAT SERPL-MCNC: 6.5 MG/DL — CRITICAL HIGH (ref 0.7–1.5)
EOSINOPHIL # BLD AUTO: 0.02 K/UL — SIGNIFICANT CHANGE UP (ref 0–0.7)
EOSINOPHIL NFR BLD AUTO: 0.1 % — SIGNIFICANT CHANGE UP (ref 0–8)
GLUCOSE BLDC GLUCOMTR-MCNC: 122 MG/DL — HIGH (ref 70–99)
GLUCOSE SERPL-MCNC: 136 MG/DL — HIGH (ref 70–99)
GLUCOSE SERPL-MCNC: 87 MG/DL — SIGNIFICANT CHANGE UP (ref 70–99)
HCT VFR BLD CALC: 33.8 % — LOW (ref 37–47)
HGB BLD-MCNC: 11.1 G/DL — LOW (ref 12–16)
IMM GRANULOCYTES NFR BLD AUTO: 0.7 % — HIGH (ref 0.1–0.3)
INR BLD: 1.16 RATIO — SIGNIFICANT CHANGE UP (ref 0.65–1.3)
LYMPHOCYTES # BLD AUTO: 13.7 % — LOW (ref 20.5–51.1)
LYMPHOCYTES # BLD AUTO: 2.22 K/UL — SIGNIFICANT CHANGE UP (ref 1.2–3.4)
MAGNESIUM SERPL-MCNC: 2.4 MG/DL — SIGNIFICANT CHANGE UP (ref 1.8–2.4)
MCHC RBC-ENTMCNC: 30.4 PG — SIGNIFICANT CHANGE UP (ref 27–31)
MCHC RBC-ENTMCNC: 32.8 G/DL — SIGNIFICANT CHANGE UP (ref 32–37)
MCV RBC AUTO: 92.6 FL — SIGNIFICANT CHANGE UP (ref 81–99)
MONOCYTES # BLD AUTO: 0.77 K/UL — HIGH (ref 0.1–0.6)
MONOCYTES NFR BLD AUTO: 4.7 % — SIGNIFICANT CHANGE UP (ref 1.7–9.3)
NEUTROPHILS # BLD AUTO: 13.11 K/UL — HIGH (ref 1.4–6.5)
NEUTROPHILS NFR BLD AUTO: 80.7 % — HIGH (ref 42.2–75.2)
NRBC # BLD: 0 /100 WBCS — SIGNIFICANT CHANGE UP (ref 0–0)
PHOSPHATE SERPL-MCNC: 4.1 MG/DL — SIGNIFICANT CHANGE UP (ref 2.1–4.9)
PLATELET # BLD AUTO: 206 K/UL — SIGNIFICANT CHANGE UP (ref 130–400)
POTASSIUM SERPL-MCNC: 4.1 MMOL/L — SIGNIFICANT CHANGE UP (ref 3.5–5)
POTASSIUM SERPL-MCNC: SIGNIFICANT CHANGE UP MMOL/L (ref 3.5–5)
POTASSIUM SERPL-SCNC: 4.1 MMOL/L — SIGNIFICANT CHANGE UP (ref 3.5–5)
POTASSIUM SERPL-SCNC: SIGNIFICANT CHANGE UP MMOL/L (ref 3.5–5)
PROT SERPL-MCNC: 5.3 G/DL — LOW (ref 6–8)
PROT SERPL-MCNC: 6.2 G/DL — SIGNIFICANT CHANGE UP (ref 6–8)
PROTHROM AB SERPL-ACNC: 13.3 SEC — HIGH (ref 9.95–12.87)
RBC # BLD: 3.65 M/UL — LOW (ref 4.2–5.4)
RBC # FLD: 13.6 % — SIGNIFICANT CHANGE UP (ref 11.5–14.5)
SODIUM SERPL-SCNC: 141 MMOL/L — SIGNIFICANT CHANGE UP (ref 135–146)
SODIUM SERPL-SCNC: 142 MMOL/L — SIGNIFICANT CHANGE UP (ref 135–146)
WBC # BLD: 16.25 K/UL — HIGH (ref 4.8–10.8)
WBC # FLD AUTO: 16.25 K/UL — HIGH (ref 4.8–10.8)

## 2021-09-15 PROCEDURE — 71045 X-RAY EXAM CHEST 1 VIEW: CPT | Mod: 26

## 2021-09-15 PROCEDURE — 76775 US EXAM ABDO BACK WALL LIM: CPT | Mod: 26

## 2021-09-15 PROCEDURE — 99232 SBSQ HOSP IP/OBS MODERATE 35: CPT

## 2021-09-15 PROCEDURE — 99233 SBSQ HOSP IP/OBS HIGH 50: CPT

## 2021-09-15 RX ORDER — MIDODRINE HYDROCHLORIDE 2.5 MG/1
10 TABLET ORAL EVERY 8 HOURS
Refills: 0 | Status: DISCONTINUED | OUTPATIENT
Start: 2021-09-15 | End: 2021-09-20

## 2021-09-15 RX ORDER — MIDODRINE HYDROCHLORIDE 2.5 MG/1
10 TABLET ORAL EVERY 8 HOURS
Refills: 0 | Status: DISCONTINUED | OUTPATIENT
Start: 2021-09-15 | End: 2021-09-15

## 2021-09-15 RX ORDER — SODIUM BICARBONATE 1 MEQ/ML
0.3 SYRINGE (ML) INTRAVENOUS
Qty: 150 | Refills: 0 | Status: DISCONTINUED | OUTPATIENT
Start: 2021-09-15 | End: 2021-09-16

## 2021-09-15 RX ADMIN — HEPARIN SODIUM 5000 UNIT(S): 5000 INJECTION INTRAVENOUS; SUBCUTANEOUS at 17:37

## 2021-09-15 RX ADMIN — CEFTRIAXONE 100 MILLIGRAM(S): 500 INJECTION, POWDER, FOR SOLUTION INTRAMUSCULAR; INTRAVENOUS at 05:19

## 2021-09-15 RX ADMIN — HEPARIN SODIUM 5000 UNIT(S): 5000 INJECTION INTRAVENOUS; SUBCUTANEOUS at 05:19

## 2021-09-15 RX ADMIN — Medication 100 MEQ/KG/HR: at 08:09

## 2021-09-15 NOTE — PROCEDURAL SAFETY CHECKLIST WITH OR WITHOUT SEDATION - NSPOSTCOMMENTFT_GEN_ALL_CORE
Use drops prescribed three times a day for seven days.   Use over the counter pain reliever for discomfort  No swimming until the medication is completed (for ten days)  Follow up if symptoms are not improving    
Clyde placed in right femoral

## 2021-09-15 NOTE — PROGRESS NOTE ADULT - SUBJECTIVE AND OBJECTIVE BOX
Nephrology progress note    Patient is seen and examined, events over the last 24 h noted .  Pt remains lethargic, on IVF, not making urine.  Allergies:  aspirin (Unknown)  penicillin (Unknown)  shellfish (Unknown)    Hospital Medications:   MEDICATIONS  (STANDING):  cefTRIAXone   IVPB 1000 milliGRAM(s) IV Intermittent every 24 hours  heparin   Injectable 5000 Unit(s) SubCutaneous every 12 hours  sodium bicarbonate  Infusion 0.301 mEq/kG/Hr (100 mL/Hr) IV Continuous <Continuous>        VITALS:  T(F): 96.8 (09-15-21 @ 08:00), Max: 97.8 (09-15-21 @ 05:00)  HR: 83 (09-15-21 @ 12:00)  BP: 104/59 (09-15-21 @ 12:00)  RR: 16 (09-15-21 @ 12:00)  SpO2: 100% (09-15-21 @ 12:00)  Wt(kg): --     @ 07:01  -  09-15 @ 07:00  --------------------------------------------------------  IN: 2000 mL / OUT: 100 mL / NET: 1900 mL    09-15 @ 07:01  -  09-15 @ 15:33  --------------------------------------------------------  IN: 1300 mL / OUT: 140 mL / NET: 1160 mL          PHYSICAL EXAM:  Constitutional: NAD  HEENT: anicteric sclera  Neck: No JVD  Respiratory: CTA  Cardiovascular: S1, S2, RRR  Gastrointestinal: BS+, soft, NT/ND  Extremities: No peripheral edema  Neurological: Lethargic  : has valencia.   Skin: No rashes  Vascular Access:    LABS:  09-15    142  |  110  |  165<HH>  ----------------------------<  136<H>  4.1   |  17  |  5.5<HH>    Ca    8.9      15 Sep 2021 11:14  Phos  4.1     09-15  Mg     2.4     09-15    TPro  5.3<L>  /  Alb  3.0<L>  /  TBili  0.2  /  DBili      /  AST  35  /  ALT  40  /  AlkPhos  112  09-15                          11.1   16.25 )-----------( 206      ( 15 Sep 2021 11:14 )             33.8       Urine Studies:  Urinalysis Basic - ( 13 Sep 2021 19:20 )    Color: Yellow / Appearance: Turbid / S.017 / pH:   Gluc:  / Ketone: Negative  / Bili: Negative / Urobili: <2 mg/dL   Blood:  / Protein: 300 mg/dL / Nitrite: Negative   Leuk Esterase: Large / RBC: 2 /HPF / WBC >720 /HPF   Sq Epi:  / Non Sq Epi: 1 /HPF / Bacteria: Moderate        < from: Xray Chest 1 View-PORTABLE IMMEDIATE (Xray Chest 1 View-PORTABLE IMMEDIATE .) (09.15.21 @ 14:56) >    No radiographic evidence of acute cardiopulmonary disease.    < end of copied text >  RADIOLOGY & ADDITIONAL STUDIES:

## 2021-09-15 NOTE — PROGRESS NOTE ADULT - SUBJECTIVE AND OBJECTIVE BOX
QUIN HERNADEZ             MRN-[id number]      Patient is a 89y old Female who presents with a chief complaint of acute renal failure (15 Sep 2021 15:32)    Currently admitted with the primary diagnosis of: acute renal failure, encephalopathy       SUBJECTIVE:  -Patient seen at bedside  -She is obtunded and unable to participate in exam    ROS:  UNABLE TO OBTAIN  due to: patient obtunded    PEx:   T(C): 36.2 (09-15-21 @ 16:00), Max: 36.6 (09-15-21 @ 05:00)  HR: 84 (09-15-21 @ 16:00) (82 - 99)  BP: 91/50 (09-15-21 @ 16:00) (91/50 - 111/55)  RR: 17 (09-15-21 @ 16:00) (15 - 19)  SpO2: 100% (09-15-21 @ 16:00) (97% - 100%)  Wt(kg): --            General:  found in bed in NAD, unable to participate in exam  Eyes:  closed  ENMT: no external oral ulcers  CVS: no tachycardia  Resp: Unlabored Non tachypneic No increased WOB  Musc: No clubbing  Neuro: unable to participate in exam  Psych: AAOx0  Skin: Non jaundiced , no rash     Last BM:  9/14    ALLERGIES: aspirin (Unknown)  penicillin (Unknown)  shellfish (Unknown)    Labs:	    CBC:                        11.1   16.25 )-----------( 206      ( 15 Sep 2021 11:14 )             33.8     CMP:    09-15    142  |  110  |  165<HH>  ----------------------------<  136<H>  4.1   |  17  |  5.5<HH>    Ca    8.9      15 Sep 2021 11:14  Phos  4.1     09-15  Mg     2.4     09-15    TPro  5.3<L>  /  Alb  3.0<L>  /  TBili  0.2  /  DBili  x   /  AST  35  /  ALT  40  /  AlkPhos  112  09-15       PT/INR - ( 15 Sep 2021 11:14 )   PT: 13.30 sec;   INR: 1.16 ratio        RADIOLOGY  US REnal  1.  No hydronephrosis bilaterally.  2.  Partially distended urinary bladder containing 400 mL of urine, despite presence of a Shankar catheter. Correlate for catheter function.  3.  Nonspecific debris within the bladder lumen. Correlate with urinalysis.      EKG  12 Lead ECG:   Ventricular Rate 89 BPM    Atrial Rate 89 BPM    P-R Interval 142 ms    QRS Duration 84 ms    Q-T Interval 340 ms    QTC Calculation(Bazett) 413 ms    P Axis 58 degrees    R Axis -1 degrees    T Axis 54 degrees    Diagnosis Line Normal sinus rhythm  Normal ECG    Confirmed by PAULINA MEJIA MD ([id number]) on 9/13/2021 10:59:38 PM (09-13-21 @ 17:12)      Imaging Personally Reviewed:  [x] YES  [ ] NO    Consultant(s) Notes Reviewed:  [x] YES  [ ] NO  Care Discussed with Consultants/Other Providers [x] YES  [ ] NO    Medications:	      MEDICATIONS  (STANDING):  cefTRIAXone   IVPB 1000 milliGRAM(s) IV Intermittent every 24 hours  heparin   Injectable 5000 Unit(s) SubCutaneous every 12 hours  midodrine 10 milliGRAM(s) Oral every 8 hours  sodium bicarbonate  Infusion 0.301 mEq/kG/Hr (100 mL/Hr) IV Continuous <Continuous>    MEDICATIONS  (PRN):        ADVANCED DIRECTIVES:              DNR DNI         DECISION MAKER: Patient [  ]  Family [ x ]  Other [  ] _______  LEGAL SURROGATE:  Sons      GOALS OF CARE DISCUSSION       Palliative care info/counseling provided	        PSYCHOSOCIAL-SPIRITUAL ASSESSMENT:       Reviewed    CURRENT DISPO PLAN:         WILL REMAIN IN HOSPITAL    REFERRALS	        Palliative Med        Unit SW/Case Mgmt

## 2021-09-15 NOTE — CONSULT NOTE ADULT - SUBJECTIVE AND OBJECTIVE BOX
VASCULAR SURGERY CONSULT NOTE      HPI:  89 yo female w/ PMH of pancreatic mass, CAD, HTN presents for unresponsiveness. Unable to provide further HPI due to mental status. Per nursing home NP, baseline mental status is A&Ox1, for past couple weeks a little bit more confused than normal, yesterday started becoming lethargic and minimally responsive.Patient is from [facility] and was DNR/DNI/DNH but due to her worsening condition. Sons were spoken to and initially rescinded the DNR/DNI/DNH but due to patient's clinical condition and poor prognosis, patient was made DNR/DNI.     In the ED, labs were remarkable for K 8.1, bicarb 10, , Cr 8.6 (baseline 1.0 in february), Ca 11.4, protein 8.4, VBG pH 7.27, pCO2 29. UA is positive. EKG is NSR.       Vascular surgery was consulted to for udall placement.     Vital Signs Last 24 Hrs  T(C): --  T(F): --  HR: 88 (13 Sep 2021 22:10) (84 - 150)  BP: 106/62 (13 Sep 2021 22:10) (85/49 - 169/104)  BP(mean): 79 (13 Sep 2021 22:10) (63 - 124)  ABP: --  ABP(mean): --  RR: 18 (13 Sep 2021 22:10) (16 - 18)  SpO2: 100% (13 Sep 2021 22:10) (97% - 100%)       (13 Sep 2021 23:57)        PAST MEDICAL & SURGICAL HISTORY:    aspirin (Unknown)  penicillin (Unknown)  shellfish (Unknown)    Home Medications:    No permtinent family history of PVD    REVIEW OF SYSTEMS:  GENERAL:                                         negative  SKIN:                                                 negative  OPTHALMOLOGIC:                          negative  ENMT:                                               negative  RESPIRATORY AND THORAX:        negative  CARDIOVASCULAR:                         negative  GASTROINTESTINAL:                       negative  NEPHROLOGY:                                  negative  MUSCULOSKELETAL:                       negative  NEUROLOGIC:                                   negative  PSYCHIATRIC:                                    negative  HEMATOLOGY/LYMPHATICS:         negative  ENDOCRINE:                                     negative  ALLERGIC/IMMUNOLOGIC:            negative    12 point ROS otherwise normal except as stated in HPI    PHYSICAL EXAM  Vital Signs Last 24 Hrs  T(C): 36.2 (15 Sep 2021 16:00), Max: 36.6 (15 Sep 2021 05:00)  T(F): 97.2 (15 Sep 2021 16:00), Max: 97.8 (15 Sep 2021 05:00)  HR: 84 (15 Sep 2021 16:00) (82 - 99)  BP: 91/50 (15 Sep 2021 16:00) (91/50 - 111/55)  BP(mean): 57 (15 Sep 2021 16:00) (57 - 78)  RR: 17 (15 Sep 2021 16:00) (15 - 19)  SpO2: 100% (15 Sep 2021 16:00) (97% - 100%)    Appearance: Normal	  HEENT:   Normal oral mucosa, PERRL, EOMI	  Neck: Supple, - JVD; Carotid Bruit   Cardiovascular: Normal S1 S2, No JVD, No murmurs,   Respiratory: Lungs clear to auscultation, No Rales, Rhonchi, Wheezing	  Gastrointestinal:  Soft, Non-tender, positive BS	  Skin: No rashes, No ecchymoses, No cyanosis  Extremities: Normal range of motion, No clubbing, cyanosis or edema  Vascular: Peripheral pulses palpable 2+ bilaterally  Neurologic: Non-focal  Psychiatry: A & O x 0, mumbles repetitive phrases      PULSES:  Femoral:  Popliteal:  Dorsal Pedal:  Posterior Tibial:  Capillary:    MEDICATIONS:   MEDICATIONS  (STANDING):  cefTRIAXone   IVPB 1000 milliGRAM(s) IV Intermittent every 24 hours  heparin   Injectable 5000 Unit(s) SubCutaneous every 12 hours  midodrine 10 milliGRAM(s) Oral every 8 hours  sodium bicarbonate  Infusion 0.301 mEq/kG/Hr (100 mL/Hr) IV Continuous <Continuous>    MEDICATIONS  (PRN):      LAB/STUDIES:                        11.1   16.25 )-----------( 206      ( 15 Sep 2021 11:14 )             33.8     09-15    142  |  110  |  165<HH>  ----------------------------<  136<H>  4.1   |  17  |  5.5<HH>    Ca    8.9      15 Sep 2021 11:14  Phos  4.1     09-15  Mg     2.4     09-15    TPro  5.3<L>  /  Alb  3.0<L>  /  TBili  0.2  /  DBili  x   /  AST  35  /  ALT  40  /  AlkPhos  112  09-15    PT/INR - ( 15 Sep 2021 11:14 )   PT: 13.30 sec;   INR: 1.16 ratio           LIVER FUNCTIONS - ( 15 Sep 2021 11:14 )  Alb: 3.0 g/dL / Pro: 5.3 g/dL / ALK PHOS: 112 U/L / ALT: 40 U/L / AST: 35 U/L / GGT: x           Culture - Urine (collected 13 Sep 2021 19:20)  Source: Clean Catch Clean Catch (Midstream)  Preliminary Report (15 Sep 2021 10:27):    >100,000 CFU/ml Klebsiella oxytoca/Raoutella ornithinolytica    Culture - Blood (collected 13 Sep 2021 19:20)  Source: .Blood Blood-Peripheral  Preliminary Report (15 Sep 2021 02:01):    No growth to date.    Culture - Blood (collected 13 Sep 2021 19:20)  Source: .Blood Blood-Peripheral  Preliminary Report (15 Sep 2021 02:01):    No growth to date.        IMAGING:      ASSESSMENT:

## 2021-09-15 NOTE — PROGRESS NOTE ADULT - ATTENDING COMMENTS
89/F with pancreatic mass, CAD, HTN presents for unresponsiveness. Unable to provide further HPI due to mental status. Per nursing home NP, baseline mental status is A&Ox1, for past couple weeks a little bit more confused than normal, prior to admission noted more lethargic and minimally responsive.    P/E:   + anasarca  awake, not conversing   withdraws for pain     Plan:   1. acute metabolic acidosis 2/2 acute renal failure   2. sepsis 2/2 acute pyelonephritis   3. metabolic encephalopathy   4. history of pancreatic mass  5. functional quadriplegia     - pH of 7.27 on VBG with anion gap of 23 and bicarb of 10 on admission   - now on bicarb drip   - plan for dialysis   - vascular surgery consult for [place] placement  - Ucx- >100,000 CFU/ml Klebsiella oxytoca/Raoutella ornithinolytica  - blood culture negative   - continue rocephin at this time   - follow sensitivity   - baseline mental status is AAOx1, bedbound   - patient is awake during exam, not conversing   - MRI from 2018 - 2 IPMN's (Intraductal papillary mucinous neoplasm)  - palliative consult   - care as per nursing - frequent repositioning and off loading

## 2021-09-15 NOTE — PROCEDURE NOTE - NSINFORMCONSENT_GEN_A_CORE
By son/Benefits, risks, and possible complications of procedure explained to patient/caregiver who verbalized understanding and gave written consent.

## 2021-09-15 NOTE — PROGRESS NOTE ADULT - PROBLEM SELECTOR PLAN 4
-DNR/DNI  -Ongoing medical management  -Family wishes to pursue dialysis  -Palliative care available to discuss GOC as appropriate

## 2021-09-15 NOTE — CONSULT NOTE ADULT - ASSESSMENT
89 yo female w/ PMH of pancreatic mass, CAD, HTN presents for unresponsiveness. Unable to provide further HPI due to mental status. Per nursing home NP, baseline mental status is A&Ox1, for past couple weeks a little bit more confused than normal, yesterday started becoming lethargic and minimally responsive.Patient is from [facility] and was DNR/DNI/DNH but due to her worsening condition. Sons were spoken to and initially rescinded the DNR/DNI/DNH but due to patient's clinical condition and poor prognosis, patient was made DNR/DNI.     In the ED, labs were remarkable for K 8.1, bicarb 10, , Cr 8.6 (baseline 1.0 in february), Ca 11.4, protein 8.4, VBG pH 7.27, pCO2 29. UA is positive. EKG is NSR.       Vascular surgery was consulted to for udall placement.     PLAN:   - Shanks placement check tomorrow - Shanks placed in Right femoral vein  - HD tomorrow  - Patient seen/examined or Plan Discussed with Fellow, Dr. Martinez   - Plan to be discussed with Attending, Dr. Giraldo

## 2021-09-15 NOTE — PROGRESS NOTE ADULT - SUBJECTIVE AND OBJECTIVE BOX
Over Night Events: events noted, nephro/ palliative reviewed, afebrile      PHYSICAL EXAM    ICU Vital Signs Last 24 Hrs  T(C): 36.6 (15 Sep 2021 05:00), Max: 36.6 (15 Sep 2021 05:00)  T(F): 97.8 (15 Sep 2021 05:00), Max: 97.8 (15 Sep 2021 05:00)  HR: 82 (15 Sep 2021 05:00) (77 - 99)  BP: 111/55 (15 Sep 2021 05:00) (95/53 - 111/55)  BP(mean): 76 (15 Sep 2021 05:00) (71 - 82)  RR: 19 (15 Sep 2021 05:00) (15 - 20)  SpO2: 97% (15 Sep 2021 05:00) (97% - 100%)      General: ill looking  HEENT: NUSRAT             Lymph Nodes: No cervical LN   Lungs: dec bs both bases  Cardiovascular: Regular   Abdomen: Soft, Positive BS  Extremities: No clubbing   Skin: Warm  Neurological: Not following commands      21 @ 07:01  -  09-15-21 @ 06:07  --------------------------------------------------------  IN:    sodium chloride 0.45%: 200 mL  Total IN: 200 mL    OUT:    Voided (mL): 100 mL  Total OUT: 100 mL    Total NET: 100 mL          LABS:                          14.7   13.49 )-----------( 271      ( 14 Sep 2021 11:27 )             44.8                                               09-15    141  |  108  |  170<HH>  ----------------------------<  87  tnp   |  13<L>  |  6.5<HH>    Ca    9.4      15 Sep 2021 03:00  Mg     2.7         TPro  6.2  /  Alb  3.1<L>  /  TBili  0.3  /  DBili  x   /  AST  62<H>  /  ALT  50<H>  /  AlkPhos  132<H>  09-15      PT/INR - ( 13 Sep 2021 16:50 )   PT: 13.70 sec;   INR: 1.19 ratio         PTT - ( 13 Sep 2021 16:50 )  PTT:29.5 sec                                       Urinalysis Basic - ( 13 Sep 2021 19:20 )    Color: Yellow / Appearance: Turbid / S.017 / pH: x  Gluc: x / Ketone: Negative  / Bili: Negative / Urobili: <2 mg/dL   Blood: x / Protein: 300 mg/dL / Nitrite: Negative   Leuk Esterase: Large / RBC: 2 /HPF / WBC >720 /HPF   Sq Epi: x / Non Sq Epi: 1 /HPF / Bacteria: Moderate                                                  LIVER FUNCTIONS - ( 15 Sep 2021 03:00 )  Alb: 3.1 g/dL / Pro: 6.2 g/dL / ALK PHOS: 132 U/L / ALT: 50 U/L / AST: 62 U/L / GGT: x                                                  Culture - Blood (collected 13 Sep 2021 19:20)  Source: .Blood Blood-Peripheral  Preliminary Report (15 Sep 2021 02:01):    No growth to date.    Culture - Blood (collected 13 Sep 2021 19:20)  Source: .Blood Blood-Peripheral  Preliminary Report (15 Sep 2021 02:01):    No growth to date.                                                                                           MEDICATIONS  (STANDING):  cefTRIAXone   IVPB 1000 milliGRAM(s) IV Intermittent every 24 hours  heparin   Injectable 5000 Unit(s) SubCutaneous every 12 hours  sodium chloride 0.45%. 1000 milliLiter(s) (100 mL/Hr) IV Continuous <Continuous>

## 2021-09-15 NOTE — PROGRESS NOTE ADULT - ASSESSMENT
89 yo female w/ PMH of pancreatic mass, CAD, HTN presents for unresponsiveness found to have metabolic acidosis and UTI.     #UTI  #Sepsis Shock: fluid responsive  -BP low in the ED, was bolused (2.5L LR) did not require pressors.  - EKG NSR  - Attempted to place NGT multiple times but unable to due to patient's condition so unable to start lokelma  - Unable to Kayexalate enema due to patient is having diarrhea  - UA positive  - s/p cefepime and vanc in ED  -9/13 Ucx- 100k GNR; Blood cx: NGTD  Plan:  - f/u palliative consult  - c/w Rocephin  -f/u UCx and blood cx    #Metabolic encephalopathy 2/2 acute renal failure  #Hyperkalemia with metabolic acidosis  - Bicarb 10, K 8.1, Cr 8, pH 7.27 --> doubt HCO3 is actually 40 (will repeat 10)  - s/p insulin and d50 and bicarb gtt  - valencia placed in ED  - Nephro consulted for possible RRT, and patient is not a good candidate for RRT and recommended medical management  - patient is anuric with 100cc/12hours  - f/u BMP  - c/w bicarb gtt 100cc/hr  -Nephro to speak to family about HD as patient may or may not tolerated and might require pressors for HD.    #Hypercalcemia  #h/o pancreatic mass  - Ca 11.4, elevated protein  - possibly 2/2 malignancy  - ordered SPEP and UPEP, ionized Ca, PTHrp, PTH and phos    #Nutrition  -patient is not appropriate for PO intake, NPO for now.  - will consider NG tube feeds if family is agreeable altough previous attempts to place NG tube have failed.    #DVT ppx: heparin subq  #GI ppx: not indicated  #Diet: NPO  #Dispo: SDU  Code: DNR/DNI 89 yo female w/ PMH of pancreatic mass, CAD, HTN presents for unresponsiveness found to have metabolic acidosis and UTI.     #UTI  #Sepsis Shock: fluid responsive  -BP low in the ED, was bolused (2.5L LR) did not require pressors.  - EKG NSR  - Attempted to place NGT multiple times but unable to due to patient's condition so unable to start lokelma  - Unable to Kayexalate enema due to patient is having diarrhea  - UA positive  - s/p cefepime and vanc in ED  -9/13 Ucx- 100k GNR; Blood cx: NGTD  Plan:  - f/u palliative consult  - c/w Rocephin  -f/u UCx and blood cx    #Metabolic encephalopathy 2/2 acute renal failure  #Hyperkalemia with metabolic acidosis  - Bicarb 10, K 8.1, Cr 8, pH 7.27 --> doubt HCO3 is actually 40 (will repeat 10)  - s/p insulin and d50 and bicarb gtt  - valencia placed in ED  - Nephro consulted for possible RRT, and patient is not a good candidate for RRT and recommended medical management  - patient is anuric with 100cc/12hours  - f/u BMP  - c/w bicarb gtt 100cc/hr  -Nephro: got consent for HD will start tomorrow  -f/u vascular for UDALL/tessio placement; f/u PT/INR    #Hypercalcemia  #h/o pancreatic mass  - Ca 11.4, elevated protein  - possibly 2/2 malignancy  - ordered SPEP and UPEP, ionized Ca, PTHrp, PTH and phos    #Nutrition  -patient is not appropriate for PO intake, NPO for now.  - will consider NG tube feeds if family is agreeable altough previous attempts to place NG tube have failed.    #DVT ppx: heparin subq  #GI ppx: not indicated  #Diet: NPO  #Dispo: SDU  Code: DNR/DNI 89 yo female w/ PMH of pancreatic mass, CAD, HTN presents for unresponsiveness found to have metabolic acidosis and UTI.     #UTI  #Sepsis Shock: fluid responsive  -BP low in the ED, was bolused (2.5L LR) did not require pressors.  - EKG NSR  - Attempted to place NGT multiple times but unable to due to patient's condition so unable to start lokelma  - Unable to Kayexalate enema due to patient is having diarrhea  - UA positive  - s/p cefepime and vanc in ED  -9/13 Ucx- >100,000 CFU/ml Klebsiella oxytoca/Raoutella ornithinolytica  -9/13 Blood cx: NGTD  Plan:  - f/u palliative consult  - c/w Rocephin  -f/u UCx and blood cx    #Metabolic encephalopathy 2/2 acute renal failure  #Hyperkalemia with metabolic acidosis  - Bicarb 10, K 8.1, Cr 8, pH 7.27 --> doubt HCO3 is actually 40 (will repeat 10)  - s/p insulin and d50 and bicarb gtt  - valencia placed in ED  - Nephro consulted for possible RRT, and patient is not a good candidate for RRT and recommended medical management  - patient is anuric with 100cc/12hours  - f/u BMP  - c/w bicarb gtt 100cc/hr  -Nephro: got consent for HD will start tomorrow  -f/u vascular for UDALL/tessio placement; f/u PT/INR    #Hypercalcemia  #h/o pancreatic mass  - Ca 11.4, elevated protein  - possibly 2/2 malignancy  - ordered SPEP and UPEP, ionized Ca, PTHrp, PTH and phos    #Nutrition  -patient is not appropriate for PO intake, NPO for now.  - will consider NG tube feeds if family is agreeable altough previous attempts to place NG tube have failed.    #DVT ppx: heparin subq  #GI ppx: not indicated  #Diet: NPO  #Dispo: SDU  Code: DNR/DNI

## 2021-09-15 NOTE — PROGRESS NOTE ADULT - ASSESSMENT
89 yo female w/ PMH of pancreatic mass, CAD, HTN presents for unresponsiveness.     Renal consulted for OSMAR with hyperkalemia, met acidosis and hypercalcemia.    #OSMAR - likely ATN, not making urine  - send UA, Urine Na creat   - obtain kidney and bladder sono  -cont bicarb drip at  100 cc/hr  -strict Is and OS  creat is essentially unchanged  D/W Son, Musa who is insisting on trial of HD despite the risk of hypotension and arrythmia. He understands that HD is not going to change the overall prognosis of the pt. He agreed for a trial of HD and signed the consent.  - [place]ph[place] placement pending  - please start Midodrine 10 mg q8h  HD today or tomorrow  #Metabolic encephalopathy 2/2 acute renal failure and dehydration  #Hyperkalemia with metabolic acidosis  - Bicarb 10, K 8.1, Cr 8, pH 7.27 on admission    #Hypercalcemia  - Ca 11.4, elevated protein  - possibly 2/2 malignancy and dehydration  - ordered SPEP and UPEP, ionized Ca, PTHrp, PTH and phos  - calcium is normal now   #UTI - UA positive  - started on  rocephin   D/W at length with Son musa and medical team

## 2021-09-15 NOTE — PROGRESS NOTE ADULT - SUBJECTIVE AND OBJECTIVE BOX
QUIN HERNADEZ 89y Female  MRN#: [id number]   Hospital Day: 2d    HPI:  89 yo female w/ PMH of pancreatic mass, CAD, HTN presents for unresponsiveness. Unable to provide further HPI due to mental status. Per nursing home NP, baseline mental status is A&Ox1, for past couple weeks a little bit more confused than normal, yesterday started becoming lethargic and minimally responsive.Patient is from [facility] and was DNR/DNI/DNH but due to her worsening condition. Sons were spoken to and initially rescinded the DNR/DNI/DNH but due to patient's clinical condition and poor prognosis, patient was made DNR/DNI.     In the ED, labs were remarkable for K 8.1, bicarb 10, , Cr 8.6 (baseline 1.0 in february), Ca 11.4, protein 8.4, VBG pH 7.27, pCO2 29. UA is positive. EKG is NSR.     Vital Signs Last 24 Hrs  T(C): --  T(F): --  HR: 88 (13 Sep 2021 22:10) (84 - 150)  BP: 106/62 (13 Sep 2021 22:10) (85/49 - 169/104)  BP(mean): 79 (13 Sep 2021 22:10) (63 - 124)  ABP: --  ABP(mean): --  RR: 18 (13 Sep 2021 22:10) (16 - 18)  SpO2: 100% (13 Sep 2021 22:10) (97% - 100%)       (13 Sep 2021 23:57)      SUBJECTIVE  Patient is a 89y old Female who presents with a chief complaint of acute renal failure (15 Sep 2021 06:06)  Currently admitted to medicine with the primary diagnosis of Sepsis secondary to UTI      INTERVAL HPI AND OVERNIGHT EVENTS:  Patient was examined and seen at bedside. This morning she is resting comfortably in bed and reports no issues or overnight events.  + BM overnight  Urine output 100cc/12 hours  NPO for now  has not been out of bed.    OBJECTIVE  PAST MEDICAL & SURGICAL HISTORY    ALLERGIES:  aspirin (Unknown)  penicillin (Unknown)  shellfish (Unknown)    MEDICATIONS:  STANDING MEDICATIONS  cefTRIAXone   IVPB 1000 milliGRAM(s) IV Intermittent every 24 hours  heparin   Injectable 5000 Unit(s) SubCutaneous every 12 hours  sodium bicarbonate  Infusion 0.301 mEq/kG/Hr IV Continuous <Continuous>  sodium chloride 0.45%. 1000 milliLiter(s) IV Continuous <Continuous>    PRN MEDICATIONS      VITAL SIGNS: Last 24 Hours  T(C): 36.6 (15 Sep 2021 05:00), Max: 36.6 (15 Sep 2021 05:00)  T(F): 97.8 (15 Sep 2021 05:00), Max: 97.8 (15 Sep 2021 05:00)  HR: 82 (15 Sep 2021 05:00) (82 - 99)  BP: 111/55 (15 Sep 2021 05:00) (95/54 - 111/55)  BP(mean): 76 (15 Sep 2021 05:00) (71 - 82)  RR: 19 (15 Sep 2021 05:00) (15 - 20)  SpO2: 97% (15 Sep 2021 05:00) (97% - 98%)    LABS:                        14.7   13.49 )-----------( 271      ( 14 Sep 2021 11:27 )             44.8     09-15    141  |  108  |  170<HH>  ----------------------------<  87  tnp   |  13<L>  |  6.5<HH>    Ca    9.4      15 Sep 2021 03:00  Mg     2.7         TPro  6.2  /  Alb  3.1<L>  /  TBili  0.3  /  DBili  x   /  AST  62<H>  /  ALT  50<H>  /  AlkPhos  132<H>  09-15    PT/INR - ( 13 Sep 2021 16:50 )   PT: 13.70 sec;   INR: 1.19 ratio         PTT - ( 13 Sep 2021 16:50 )  PTT:29.5 sec  Urinalysis Basic - ( 13 Sep 2021 19:20 )    Color: Yellow / Appearance: Turbid / S.017 / pH: x  Gluc: x / Ketone: Negative  / Bili: Negative / Urobili: <2 mg/dL   Blood: x / Protein: 300 mg/dL / Nitrite: Negative   Leuk Esterase: Large / RBC: 2 /HPF / WBC >720 /HPF   Sq Epi: x / Non Sq Epi: 1 /HPF / Bacteria: Moderate        Lactate, Blood: 3.0 mmol/L *H* (21 @ 11:27)      Culture - Urine (collected 13 Sep 2021 19:20)  Source: Clean Catch Clean Catch (Midstream)  Preliminary Report (15 Sep 2021 06:41):    >100,000 CFU/ml Gram Negative Rods    Culture - Blood (collected 13 Sep 2021 19:20)  Source: .Blood Blood-Peripheral  Preliminary Report (15 Sep 2021 02:01):    No growth to date.    Culture - Blood (collected 13 Sep 2021 19:20)  Source: .Blood Blood-Peripheral  Preliminary Report (15 Sep 2021 02:01):    No growth to date.      RADIOLOGY: No new imaging.      PHYSICAL EXAM:  CONSTITUTIONAL: No acute distress, well-developed, well-groomed, does not open eyes in response to name or palpation. Unresponsive to questioning  HEAD: Atraumatic, normocephalic  EYES: EOM intact, Pupils equally round but minimally responsive to light, conjunctiva and sclera clear  ENT: Supple, moist mucous membranes  PULMONARY: Clear to auscultation bilaterally; no wheezes, rales, or rhonchi  CARDIOVASCULAR: Regular rate; no murmurs, rubs, or gallops  GASTROINTESTINAL: Soft, groans on light palpation, non-distended; bowel sounds present  MUSCULOSKELETAL: 2+ peripheral pulses; no edema  NEUROLOGY: does not follow commands, ANOx0  SKIN: warm and dry

## 2021-09-15 NOTE — PROGRESS NOTE ADULT - ASSESSMENT
89[facility] with AMS was A&Ox1 at baseline being treated for urosepsis and OSMAR being evaluated for GOC.     Patient seen at bedside. She is obtunded and unable to participate in exam.    Received a call from patient's son, Jesus, today.  He noted that he had spoken with the nephrologist who had offered dialysis, and he and his brother had already accepted and wished to initiate it.  He noted that he and his family wanted to continue DNR/DNI.    We discussed the patient's wishes.  Jesus noted the patient stated that she would never want to be connected to machines to keep her alive.  We discussed that sometimes dialysis can be looked at like a machine that keeps people alive in that way, and if the patient does not improve despite dialysis, he and his brother could consider comfort measures only/hospice.  All questions answered.        MEDD (morphine equivalent daily dose): na      See Recs below. Discussed with primary team    Please call [phone] with questions or concerns 24/7.   We will continue to follow.     20 minutes spent on advance care planning.

## 2021-09-15 NOTE — PROGRESS NOTE ADULT - ASSESSMENT
IMPRESSION:    Altered MS/ toxic metabolic  Acute on chronic renal failure  severe metabolic  acidosis  pancreatic mass  UTI      PLAN:    CNS: Avoid CNS depressant, head CT    HEENT:  Oral care    PULMONARY:  HOB @ 45 degrees, Aspiration precaution, keep Sao2 88 to 94%, CXR    CARDIOVASCULAR: Bicarb drip    GI: GI prophylaxis                                          Feeding if needed NGT    RENAL:  F/u  lytes.  Correct as needed. accurate I/O, Renal us,    INFECTIOUS DISEASE: IV ABX, fup cx    HEMATOLOGICAL:  DVT prophylaxis.    ENDOCRINE:  Follow up FS.  Insulin protocol if needed.    floor

## 2021-09-16 LAB
-  AMIKACIN: SIGNIFICANT CHANGE UP
-  AMOXICILLIN/CLAVULANIC ACID: SIGNIFICANT CHANGE UP
-  AMPICILLIN/SULBACTAM: SIGNIFICANT CHANGE UP
-  AMPICILLIN: SIGNIFICANT CHANGE UP
-  AZTREONAM: SIGNIFICANT CHANGE UP
-  CEFAZOLIN: SIGNIFICANT CHANGE UP
-  CEFEPIME: SIGNIFICANT CHANGE UP
-  CEFOXITIN: SIGNIFICANT CHANGE UP
-  CEFTRIAXONE: SIGNIFICANT CHANGE UP
-  CIPROFLOXACIN: SIGNIFICANT CHANGE UP
-  ERTAPENEM: SIGNIFICANT CHANGE UP
-  GENTAMICIN: SIGNIFICANT CHANGE UP
-  IMIPENEM: SIGNIFICANT CHANGE UP
-  LEVOFLOXACIN: SIGNIFICANT CHANGE UP
-  MEROPENEM: SIGNIFICANT CHANGE UP
-  NITROFURANTOIN: SIGNIFICANT CHANGE UP
-  PIPERACILLIN/TAZOBACTAM: SIGNIFICANT CHANGE UP
-  TIGECYCLINE: SIGNIFICANT CHANGE UP
-  TOBRAMYCIN: SIGNIFICANT CHANGE UP
-  TRIMETHOPRIM/SULFAMETHOXAZOLE: SIGNIFICANT CHANGE UP
ALBUMIN SERPL ELPH-MCNC: 2.9 G/DL — LOW (ref 3.5–5.2)
ALP SERPL-CCNC: 98 U/L — SIGNIFICANT CHANGE UP (ref 30–115)
ALT FLD-CCNC: 40 U/L — SIGNIFICANT CHANGE UP (ref 0–41)
ANION GAP SERPL CALC-SCNC: 15 MMOL/L — HIGH (ref 7–14)
AST SERPL-CCNC: 41 U/L — SIGNIFICANT CHANGE UP (ref 0–41)
BASOPHILS # BLD AUTO: 0.01 K/UL — SIGNIFICANT CHANGE UP (ref 0–0.2)
BASOPHILS NFR BLD AUTO: 0.1 % — SIGNIFICANT CHANGE UP (ref 0–1)
BILIRUB SERPL-MCNC: 0.2 MG/DL — SIGNIFICANT CHANGE UP (ref 0.2–1.2)
BUN SERPL-MCNC: 135 MG/DL — CRITICAL HIGH (ref 10–20)
CALCIUM SERPL-MCNC: 8.4 MG/DL — LOW (ref 8.5–10.1)
CHLORIDE SERPL-SCNC: 104 MMOL/L — SIGNIFICANT CHANGE UP (ref 98–110)
CO2 SERPL-SCNC: 27 MMOL/L — SIGNIFICANT CHANGE UP (ref 17–32)
CREAT SERPL-MCNC: 4.4 MG/DL — CRITICAL HIGH (ref 0.7–1.5)
CULTURE RESULTS: SIGNIFICANT CHANGE UP
EOSINOPHIL # BLD AUTO: 0.14 K/UL — SIGNIFICANT CHANGE UP (ref 0–0.7)
EOSINOPHIL NFR BLD AUTO: 1 % — SIGNIFICANT CHANGE UP (ref 0–8)
GLUCOSE BLDC GLUCOMTR-MCNC: 108 MG/DL — HIGH (ref 70–99)
GLUCOSE BLDC GLUCOMTR-MCNC: 127 MG/DL — HIGH (ref 70–99)
GLUCOSE BLDC GLUCOMTR-MCNC: 98 MG/DL — SIGNIFICANT CHANGE UP (ref 70–99)
GLUCOSE SERPL-MCNC: 109 MG/DL — HIGH (ref 70–99)
HCT VFR BLD CALC: 32.3 % — LOW (ref 37–47)
HGB BLD-MCNC: 10.8 G/DL — LOW (ref 12–16)
IMM GRANULOCYTES NFR BLD AUTO: 0.9 % — HIGH (ref 0.1–0.3)
LACTATE SERPL-SCNC: 2.1 MMOL/L — HIGH (ref 0.7–2)
LYMPHOCYTES # BLD AUTO: 17.9 % — LOW (ref 20.5–51.1)
LYMPHOCYTES # BLD AUTO: 2.49 K/UL — SIGNIFICANT CHANGE UP (ref 1.2–3.4)
MAGNESIUM SERPL-MCNC: 2.3 MG/DL — SIGNIFICANT CHANGE UP (ref 1.8–2.4)
MCHC RBC-ENTMCNC: 30.8 PG — SIGNIFICANT CHANGE UP (ref 27–31)
MCHC RBC-ENTMCNC: 33.4 G/DL — SIGNIFICANT CHANGE UP (ref 32–37)
MCV RBC AUTO: 92 FL — SIGNIFICANT CHANGE UP (ref 81–99)
METHOD TYPE: SIGNIFICANT CHANGE UP
MONOCYTES # BLD AUTO: 0.62 K/UL — HIGH (ref 0.1–0.6)
MONOCYTES NFR BLD AUTO: 4.5 % — SIGNIFICANT CHANGE UP (ref 1.7–9.3)
NEUTROPHILS # BLD AUTO: 10.53 K/UL — HIGH (ref 1.4–6.5)
NEUTROPHILS NFR BLD AUTO: 75.6 % — HIGH (ref 42.2–75.2)
NRBC # BLD: 0 /100 WBCS — SIGNIFICANT CHANGE UP (ref 0–0)
ORGANISM # SPEC MICROSCOPIC CNT: SIGNIFICANT CHANGE UP
ORGANISM # SPEC MICROSCOPIC CNT: SIGNIFICANT CHANGE UP
PLATELET # BLD AUTO: 192 K/UL — SIGNIFICANT CHANGE UP (ref 130–400)
POTASSIUM SERPL-MCNC: 4.2 MMOL/L — SIGNIFICANT CHANGE UP (ref 3.5–5)
POTASSIUM SERPL-SCNC: 4.2 MMOL/L — SIGNIFICANT CHANGE UP (ref 3.5–5)
PROT SERPL-MCNC: 5.4 G/DL — LOW (ref 6–8)
RBC # BLD: 3.51 M/UL — LOW (ref 4.2–5.4)
RBC # FLD: 13.5 % — SIGNIFICANT CHANGE UP (ref 11.5–14.5)
SODIUM SERPL-SCNC: 146 MMOL/L — SIGNIFICANT CHANGE UP (ref 135–146)
SPECIMEN SOURCE: SIGNIFICANT CHANGE UP
WBC # BLD: 13.92 K/UL — HIGH (ref 4.8–10.8)
WBC # FLD AUTO: 13.92 K/UL — HIGH (ref 4.8–10.8)

## 2021-09-16 PROCEDURE — 99232 SBSQ HOSP IP/OBS MODERATE 35: CPT

## 2021-09-16 PROCEDURE — 71045 X-RAY EXAM CHEST 1 VIEW: CPT | Mod: 26

## 2021-09-16 PROCEDURE — 99497 ADVNCD CARE PLAN 30 MIN: CPT | Mod: 25

## 2021-09-16 PROCEDURE — 99233 SBSQ HOSP IP/OBS HIGH 50: CPT

## 2021-09-16 PROCEDURE — 99222 1ST HOSP IP/OBS MODERATE 55: CPT

## 2021-09-16 RX ORDER — ALTEPLASE 100 MG
4 KIT INTRAVENOUS ONCE
Refills: 0 | Status: DISCONTINUED | OUTPATIENT
Start: 2021-09-16 | End: 2021-09-20

## 2021-09-16 RX ORDER — SODIUM CHLORIDE 9 MG/ML
1000 INJECTION, SOLUTION INTRAVENOUS
Refills: 0 | Status: DISCONTINUED | OUTPATIENT
Start: 2021-09-16 | End: 2021-09-16

## 2021-09-16 RX ORDER — SODIUM CHLORIDE 9 MG/ML
1000 INJECTION, SOLUTION INTRAVENOUS
Refills: 0 | Status: COMPLETED | OUTPATIENT
Start: 2021-09-16 | End: 2021-09-17

## 2021-09-16 RX ADMIN — HEPARIN SODIUM 5000 UNIT(S): 5000 INJECTION INTRAVENOUS; SUBCUTANEOUS at 05:26

## 2021-09-16 RX ADMIN — HEPARIN SODIUM 5000 UNIT(S): 5000 INJECTION INTRAVENOUS; SUBCUTANEOUS at 17:57

## 2021-09-16 RX ADMIN — SODIUM CHLORIDE 50 MILLILITER(S): 9 INJECTION, SOLUTION INTRAVENOUS at 17:59

## 2021-09-16 RX ADMIN — CEFTRIAXONE 100 MILLIGRAM(S): 500 INJECTION, POWDER, FOR SOLUTION INTRAMUSCULAR; INTRAVENOUS at 06:05

## 2021-09-16 RX ADMIN — Medication 100 MEQ/KG/HR: at 05:58

## 2021-09-16 NOTE — PROGRESS NOTE ADULT - PROBLEM SELECTOR PLAN 3
Family had agreed to trial of hemodialysis and now not indicated as Vijay was informed by renal  -f/u renal

## 2021-09-16 NOTE — PROGRESS NOTE ADULT - ATTENDING COMMENTS
89/F with pancreatic mass, CAD, HTN presents for unresponsiveness. Unable to provide further HPI due to mental status. Per nursing home NP, baseline mental status is A&Ox1, for past couple weeks a little bit more confused than normal, prior to admission noted more lethargic and minimally responsive.    P/E:   bilateral upper extremity edema  awake, mumbling   non purposeful movements   right femoral [place] inplace     Plan:   1. acute metabolic acidosis 2/2 acute renal failure - improving   2. sepsis 2/2 acute pyelonephritis   3. metabolic encephalopathy   4. history of pancreatic mass  5. functional quadriplegia     - pH of 7.27 on VBG with anion gap of 23 and bicarb of 10 on admission   - s/p bicarb drip - will switch to PO bicarb after NG tube placement   - s/p right femoral [place] placement   - renal function and acidosis improving - K level wnl - no need for urgent dialysis - will hold off on dialysis at this time given improvement   - nephro on board   - Ucx- >100,000 CFU/ml Klebsiella oxytoca/Raoutella ornithinolytica  - blood culture negative   - continue rocephin at this time   - follow sensitivity   - baseline mental status is AAOx1, bedbound - appears at baseline on exam today   - patient is awake during exam, not conversing   - MRI from 2018 - 2 IPMN's (Intraductal papillary mucinous neoplasm)  - palliative consult   - care as per nursing - frequent repositioning and off loading    Provider Handoff: improvement in renal function, possible need for dialysis

## 2021-09-16 NOTE — PROGRESS NOTE ADULT - SUBJECTIVE AND OBJECTIVE BOX
Over Night Events: events noted, sp r femoral u doll, moaning, afebrile, still on bicarb drip    PHYSICAL EXAM    ICU Vital Signs Last 24 Hrs  T(C): 36.3 (15 Sep 2021 20:32), Max: 36.3 (15 Sep 2021 20:32)  T(F): 97.4 (15 Sep 2021 20:32), Max: 97.4 (15 Sep 2021 20:32)  HR: 80 (16 Sep 2021 05:42) (80 - 97)  BP: 100/55 (16 Sep 2021 05:42) (91/50 - 112/64)  BP(mean): 57 (15 Sep 2021 16:00) (57 - 78)  RR: 17 (16 Sep 2021 05:42) (16 - 19)  SpO2: 97% (16 Sep 2021 05:42) (97% - 100%)      General: ill looking  HEENT: NUSRAT             Lymph Nodes: No cervical LN   Lungs: Bilateral BS  Cardiovascular: Regular   Abdomen: Soft, Positive BS  Extremities: No clubbing   Skin: Warm  Neurological: Non focal , not following commands      09-14-21 @ 07:01  -  09-15-21 @ 07:00  --------------------------------------------------------  IN:    sodium chloride 0.45%: 2000 mL  Total IN: 2000 mL    OUT:    Voided (mL): 100 mL  Total OUT: 100 mL    Total NET: 1900 mL      09-15-21 @ 07:01  -  09-16-21 @ 06:05  --------------------------------------------------------  IN:    Sodium Bicarbonate: 2300 mL    sodium chloride 0.45%: 500 mL  Total IN: 2800 mL    OUT:    Indwelling Catheter - Urethral (mL): 50 mL    Voided (mL): 140 mL  Total OUT: 190 mL    Total NET: 2610 mL          LABS:                          11.1   16.25 )-----------( 206      ( 15 Sep 2021 11:14 )             33.8                                               09-15    142  |  110  |  165<HH>  ----------------------------<  136<H>  4.1   |  17  |  5.5<HH>    Ca    8.9      15 Sep 2021 11:14  Phos  4.1     09-15  Mg     2.4     09-15    TPro  5.3<L>  /  Alb  3.0<L>  /  TBili  0.2  /  DBili  x   /  AST  35  /  ALT  40  /  AlkPhos  112  09-15      PT/INR - ( 15 Sep 2021 11:14 )   PT: 13.30 sec;   INR: 1.16 ratio                                                                                              LIVER FUNCTIONS - ( 15 Sep 2021 11:14 )  Alb: 3.0 g/dL / Pro: 5.3 g/dL / ALK PHOS: 112 U/L / ALT: 40 U/L / AST: 35 U/L / GGT: x                                                  Culture - Urine (collected 13 Sep 2021 19:20)  Source: Clean Catch Clean Catch (Midstream)  Preliminary Report (15 Sep 2021 10:27):    >100,000 CFU/ml Klebsiella oxytoca/Raoutella ornithinolytica    Culture - Blood (collected 13 Sep 2021 19:20)  Source: .Blood Blood-Peripheral  Preliminary Report (15 Sep 2021 02:01):    No growth to date.    Culture - Blood (collected 13 Sep 2021 19:20)  Source: .Blood Blood-Peripheral  Preliminary Report (15 Sep 2021 02:01):    No growth to date.                                                                                           MEDICATIONS  (STANDING):  cefTRIAXone   IVPB 1000 milliGRAM(s) IV Intermittent every 24 hours  heparin   Injectable 5000 Unit(s) SubCutaneous every 12 hours  midodrine 10 milliGRAM(s) Oral every 8 hours  sodium bicarbonate  Infusion 0.301 mEq/kG/Hr (100 mL/Hr) IV Continuous <Continuous>    MEDICATIONS  (PRN):

## 2021-09-16 NOTE — PROGRESS NOTE ADULT - SUBJECTIVE AND OBJECTIVE BOX
Orders Placed This Encounter     FOOT EXAM  NO CHARGE [[id number]]     Hemoglobin A1c     Last Lab Result: Hemoglobin A1C (%)       Date                     Value                 06/25/2018               5.3              ----------     Albumin Random Urine Quantitative with Creat Ratio     Last Lab Result: No results found for: MICROALBUMIN     Order Specific Question:   Includes     Answer:   with Creat Ratio     atorvastatin (LIPITOR) 10 MG tablet     Sig: Take 1 tablet (10 mg) by mouth daily     Dispense:  90 tablet     Refill:  3     carvedilol (COREG) 25 MG tablet     Sig: Take 1 tablet (25 mg) by mouth 2 times daily (with meals)     Dispense:  180 tablet     Refill:  3     Stop Pantoprozol    Continue Aspirin and Plavix        Lab Results   Component Value Date    A1C 5.0 10/29/2018    A1C 5.3 06/25/2018    A1C 5.7 03/21/2018    A1C 5.8 11/15/2017    A1C 6.0 07/27/2017          QUIN HERNADEZ 89y Female  MRN#: [id number]   Hospital Day: 3d    HPI:  89 yo female w/ PMH of pancreatic mass, CAD, HTN presents for unresponsiveness. Unable to provide further HPI due to mental status. Per nursing home NP, baseline mental status is A&Ox1, for past couple weeks a little bit more confused than normal, yesterday started becoming lethargic and minimally responsive.Patient is from [facility] and was DNR/DNI/DNH but due to her worsening condition. Sons were spoken to and initially rescinded the DNR/DNI/DNH but due to patient's clinical condition and poor prognosis, patient was made DNR/DNI.     In the ED, labs were remarkable for K 8.1, bicarb 10, , Cr 8.6 (baseline 1.0 in february), Ca 11.4, protein 8.4, VBG pH 7.27, pCO2 29. UA is positive. EKG is NSR.     Vital Signs Last 24 Hrs  T(C): --  T(F): --  HR: 88 (13 Sep 2021 22:10) (84 - 150)  BP: 106/62 (13 Sep 2021 22:10) (85/49 - 169/104)  BP(mean): 79 (13 Sep 2021 22:10) (63 - 124)  ABP: --  ABP(mean): --  RR: 18 (13 Sep 2021 22:10) (16 - 18)  SpO2: 100% (13 Sep 2021 22:10) (97% - 100%)       (13 Sep 2021 23:57)      SUBJECTIVE  Patient is a 89y old Female who presents with a chief complaint of acute renal failure (16 Sep 2021 06:05)  Currently admitted to medicine with the primary diagnosis of Sepsis secondary to UTI  Patient is moaning and groaning in bed saying " i want a new nursing home"  Unable to take ROS 2/2 AMS  has not been out of bed  urine output very low 100cc/12hr      INTERVAL HPI AND OVERNIGHT EVENTS:  Patient was examined and seen at bedside. This morning she is resting comfortably in bed and reports no issues or overnight events.      OBJECTIVE  PAST MEDICAL & SURGICAL HISTORY    ALLERGIES:  aspirin (Unknown)  penicillin (Unknown)  shellfish (Unknown)    MEDICATIONS:  STANDING MEDICATIONS  alteplase    CDT Bolus. 4 milliGRAM(s) IntraCatheter. once  cefTRIAXone   IVPB 1000 milliGRAM(s) IV Intermittent every 24 hours  heparin   Injectable 5000 Unit(s) SubCutaneous every 12 hours  midodrine 10 milliGRAM(s) Oral every 8 hours    PRN MEDICATIONS      VITAL SIGNS: Last 24 Hours  T(C): 36.1 (16 Sep 2021 07:45), Max: 36.3 (15 Sep 2021 20:32)  T(F): 96.9 (16 Sep 2021 07:45), Max: 97.4 (15 Sep 2021 20:32)  HR: 92 (16 Sep 2021 07:45) (80 - 97)  BP: 118/57 (16 Sep 2021 07:45) (91/50 - 118/57)  BP(mean): 57 (15 Sep 2021 16:00) (57 - 78)  RR: 18 (16 Sep 2021 07:45) (16 - 19)  SpO2: 98% (16 Sep 2021 07:45) (97% - 100%)    LABS:                        10.8   13.92 )-----------( 192      ( 16 Sep 2021 05:45 )             32.3     09-16    146  |  104  |  135<HH>  ----------------------------<  109<H>  4.2   |  27  |  4.4<HH>    Ca    8.4<L>      16 Sep 2021 05:45  Phos  4.1     09-15  Mg     2.3     09-16    TPro  5.4<L>  /  Alb  2.9<L>  /  TBili  0.2  /  DBili  x   /  AST  41  /  ALT  40  /  AlkPhos  98  09-16    PT/INR - ( 15 Sep 2021 11:14 )   PT: 13.30 sec;   INR: 1.16 ratio         Lactate, Blood: 2.1 mmol/L *H* (09-16-21 @ 05:45)      Culture - Urine (collected 13 Sep 2021 19:20)  Source: Clean Catch Clean Catch (Midstream)  Preliminary Report (15 Sep 2021 10:27):    >100,000 CFU/ml Klebsiella oxytoca/Raoutella ornithinolytica    Culture - Blood (collected 13 Sep 2021 19:20)  Source: .Blood Blood-Peripheral  Preliminary Report (15 Sep 2021 02:01):    No growth to date.    Culture - Blood (collected 13 Sep 2021 19:20)  Source: .Blood Blood-Peripheral  Preliminary Report (15 Sep 2021 02:01):    No growth to date.          RADIOLOGY:     US Renal (09.15.21 @ 17:26)  IMPRESSION:  1.  No hydronephrosis bilaterally.  2.  Partially distended urinary bladder containing 400 mL of urine, despite presence of a Shankar catheter. Correlate for catheter function.  3.  Nonspecific debris within the bladder lumen. Correlate with urinalysis.      PHYSICAL EXAM:  CONSTITUTIONAL: No acute distress, well-developed, well-groomed, does not open eyes in response to name or palpation. Unresponsive to questioning  HEAD: Atraumatic, normocephalic  EYES: EOM intact, Pupils equally round but minimally responsive to light, conjunctiva and sclera clear  ENT: Supple, moist mucous membranes  PULMONARY: Clear to auscultation bilaterally; no wheezes, rales, or rhonchi  CARDIOVASCULAR: Regular rate; no murmurs, rubs, or gallops  GASTROINTESTINAL: Soft, groans on light palpation, non-distended; bowel sounds present  MUSCULOSKELETAL: 2+ peripheral pulses; no edema  NEUROLOGY: does not follow commands, ANOx0  SKIN: warm and dry. R Fem UDALL clean and intact QUIN HERNADEZ 89y Female  MRN#: [id number]   Hospital Day: 3d    HPI:  89 yo female w/ PMH of pancreatic mass, CAD, HTN presents for unresponsiveness. Unable to provide further HPI due to mental status. Per nursing home NP, baseline mental status is A&Ox1, for past couple weeks a little bit more confused than normal, yesterday started becoming lethargic and minimally responsive.Patient is from [facility] and was DNR/DNI/DNH but due to her worsening condition. Sons were spoken to and initially rescinded the DNR/DNI/DNH but due to patient's clinical condition and poor prognosis, patient was made DNR/DNI.     In the ED, labs were remarkable for K 8.1, bicarb 10, , Cr 8.6 (baseline 1.0 in february), Ca 11.4, protein 8.4, VBG pH 7.27, pCO2 29. UA is positive. EKG is NSR.     Vital Signs Last 24 Hrs  T(C): --  T(F): --  HR: 88 (13 Sep 2021 22:10) (84 - 150)  BP: 106/62 (13 Sep 2021 22:10) (85/49 - 169/104)  BP(mean): 79 (13 Sep 2021 22:10) (63 - 124)  ABP: --  ABP(mean): --  RR: 18 (13 Sep 2021 22:10) (16 - 18)  SpO2: 100% (13 Sep 2021 22:10) (97% - 100%)       (13 Sep 2021 23:57)      SUBJECTIVE  Patient is a 89y old Female who presents with a chief complaint of acute renal failure (16 Sep 2021 06:05)  Currently admitted to medicine with the primary diagnosis of Sepsis secondary to UTI  Patient is moaning and groaning in bed saying " i want a new nursing home"  Unable to take ROS 2/2 AMS  has not been out of bed  urine output very low 100cc/12hr  + BM overnight      INTERVAL HPI AND OVERNIGHT EVENTS:  Patient was examined and seen at bedside. This morning she is resting comfortably in bed and reports no issues or overnight events.      OBJECTIVE  PAST MEDICAL & SURGICAL HISTORY    ALLERGIES:  aspirin (Unknown)  penicillin (Unknown)  shellfish (Unknown)    MEDICATIONS:  STANDING MEDICATIONS  alteplase    CDT Bolus. 4 milliGRAM(s) IntraCatheter. once  cefTRIAXone   IVPB 1000 milliGRAM(s) IV Intermittent every 24 hours  heparin   Injectable 5000 Unit(s) SubCutaneous every 12 hours  midodrine 10 milliGRAM(s) Oral every 8 hours    PRN MEDICATIONS      VITAL SIGNS: Last 24 Hours  T(C): 36.1 (16 Sep 2021 07:45), Max: 36.3 (15 Sep 2021 20:32)  T(F): 96.9 (16 Sep 2021 07:45), Max: 97.4 (15 Sep 2021 20:32)  HR: 92 (16 Sep 2021 07:45) (80 - 97)  BP: 118/57 (16 Sep 2021 07:45) (91/50 - 118/57)  BP(mean): 57 (15 Sep 2021 16:00) (57 - 78)  RR: 18 (16 Sep 2021 07:45) (16 - 19)  SpO2: 98% (16 Sep 2021 07:45) (97% - 100%)    LABS:                        10.8   13.92 )-----------( 192      ( 16 Sep 2021 05:45 )             32.3     09-16    146  |  104  |  135<HH>  ----------------------------<  109<H>  4.2   |  27  |  4.4<HH>    Ca    8.4<L>      16 Sep 2021 05:45  Phos  4.1     09-15  Mg     2.3     09-16    TPro  5.4<L>  /  Alb  2.9<L>  /  TBili  0.2  /  DBili  x   /  AST  41  /  ALT  40  /  AlkPhos  98  09-16    PT/INR - ( 15 Sep 2021 11:14 )   PT: 13.30 sec;   INR: 1.16 ratio         Lactate, Blood: 2.1 mmol/L *H* (09-16-21 @ 05:45)      Culture - Urine (collected 13 Sep 2021 19:20)  Source: Clean Catch Clean Catch (Midstream)  Preliminary Report (15 Sep 2021 10:27):    >100,000 CFU/ml Klebsiella oxytoca/Raoutella ornithinolytica    Culture - Blood (collected 13 Sep 2021 19:20)  Source: .Blood Blood-Peripheral  Preliminary Report (15 Sep 2021 02:01):    No growth to date.    Culture - Blood (collected 13 Sep 2021 19:20)  Source: .Blood Blood-Peripheral  Preliminary Report (15 Sep 2021 02:01):    No growth to date.          RADIOLOGY:     US Renal (09.15.21 @ 17:26)  IMPRESSION:  1.  No hydronephrosis bilaterally.  2.  Partially distended urinary bladder containing 400 mL of urine, despite presence of a Shankar catheter. Correlate for catheter function.  3.  Nonspecific debris within the bladder lumen. Correlate with urinalysis.      PHYSICAL EXAM:  CONSTITUTIONAL: No acute distress, well-developed, well-groomed, does not open eyes in response to name or palpation. Unresponsive to questioning  HEAD: Atraumatic, normocephalic  EYES: EOM intact, Pupils equally round but minimally responsive to light, conjunctiva and sclera clear  ENT: Supple, moist mucous membranes  PULMONARY: Clear to auscultation bilaterally; no wheezes, rales, or rhonchi  CARDIOVASCULAR: Regular rate; no murmurs, rubs, or gallops  GASTROINTESTINAL: Soft, groans on light palpation, non-distended; bowel sounds present  MUSCULOSKELETAL: 2+ peripheral pulses; no edema  NEUROLOGY: does not follow commands, ANOx0  SKIN: warm and dry. R Fem UDALL clean and intact

## 2021-09-16 NOTE — PROGRESS NOTE ADULT - PROBLEM SELECTOR PLAN 4
-DNR/DNI  -Ongoing medical management  -Family wishes more time to think about CMO/Hospice; want to hold off on NGT for now  -Palliative care available to discuss GOC as appropriate

## 2021-09-16 NOTE — PROGRESS NOTE ADULT - ASSESSMENT
89 yo female w/ PMH of pancreatic mass, CAD, HTN presents for unresponsiveness found to have metabolic acidosis and UTI.     #UTI  #Sepsis Shock: fluid responsive  -BP low in the ED, was bolused (2.5L LR) did not require pressors.  - EKG NSR  - Attempted to place NGT multiple times but unable to due to patient's condition so unable to start lokelma  - Unable to Kayexalate enema due to patient is having diarrhea  - UA positive  - s/p cefepime and vanc in ED  -9/13 Ucx- >100,000 CFU/ml Klebsiella oxytoca/Raoutella ornithinolytica  -9/13 Blood cx: NGTD  Plan:  - f/u palliative consult  - c/w Rocephin  -f/u UCx and blood cx    #Metabolic encephalopathy 2/2 acute renal failure  #Hyperkalemia with metabolic acidosis  - Bicarb 10, K 8.1, Cr 8, pH 7.27 --> doubt HCO3 is actually 40 (will repeat 10)  - s/p insulin and d50 and bicarb gtt  - valencia placed in ED  - Nephro consulted for possible RRT, and patient is not a good candidate for RRT and recommended medical management  - patient is anuric with 100cc/12hours  - f/u BMP  - c/w bicarb gtt 100cc/hr  -Nephro: got consent for HD will start tomorrow  -f/u vascular for UDALL/tessio placement; f/u PT/INR    #Hypercalcemia  #h/o pancreatic mass  - Ca 11.4, elevated protein  - possibly 2/2 malignancy  - ordered SPEP and UPEP, ionized Ca, PTHrp, PTH and phos    #Nutrition  -patient is not appropriate for PO intake, NPO for now.  - will consider NG tube feeds if family is agreeable altough previous attempts to place NG tube have failed.    #DVT ppx: heparin subq  #GI ppx: not indicated  #Diet: NPO  #Dispo: SDU  Code: DNR/DNI  89 yo female w/ PMH of pancreatic mass, CAD, HTN presents for unresponsiveness found to have metabolic acidosis and UTI.     #UTI  #Sepsis Shock: fluid responsive  -BP low in the ED, was bolused (2.5L LR) did not require pressors.  - EKG NSR  - Attempted to place NGT multiple times but unable to due to patient's condition so unable to start lokelma  - Unable to Kayexalate enema due to patient is having diarrhea  - UA positive  - s/p cefepime and vanc in ED  -9/13 Ucx- >100,000 CFU/ml Klebsiella oxytoca/Raoutella ornithinolytica  -9/13 Blood cx: NGTD  Plan:  - f/u palliative consult  - c/w Rocephin  -f/u UCx sensitivities and blood cx    #Metabolic encephalopathy 2/2 acute renal failure  #Hyperkalemia with metabolic acidosis--resolved  #Uremia  - Bicarb 10, K 8.1, Cr 8, pH 7.27 on admission  - 9/16 HCO3 27 K 4.2  Cr 4.4  - s/p insulin and d50 and bicarb gtt  - valencia placed in ED  - patient is anuric with 100cc/12hours  - JOEY: no hydro, no stones, debris in bladder  - 9/16 d/c'd bicarb gtt 100cc/hr  -s/p R fem UDALL  Plan:  - HD will start today    #Hypercalcemia--resolved  #h/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    #Nutrition  -patient is not appropriate for PO intake, NPO for now.  - will consider NG tube feeds if family is agreeable altough previous attempts to place NG tube have failed.    #DVT ppx: heparin subq  #GI ppx: not indicated  #Diet: NPO  #Dispo: SDU  Code: DNR/DNI  89 yo female w/ PMH of pancreatic mass, CAD, HTN presents for unresponsiveness found to have acute renal failure, metabolic acidosis, and UTI s/p UDALL for HD trial.    #UTI  #Sepsis Shock: fluid responsive  -BP low in the ED, was bolused (2.5L LR) did not require pressors.  - EKG NSR  - Attempted to place NGT multiple times but unable to due to patient's condition so unable to start lokelma  - Unable to Kayexalate enema due to patient is having diarrhea  - UA positive  - s/p cefepime and vanc in ED  -9/13 Ucx- >100,000 CFU/ml Klebsiella oxytoca/Raoutella ornithinolytica  -9/13 Blood cx: NGTD  Plan:  - f/u palliative consult  - c/w Rocephin  -f/u UCx sensitivities and blood cx    #Metabolic encephalopathy 2/2 acute renal failure  #Hyperkalemia with metabolic acidosis--resolved  #Uremia  - Bicarb 10, K 8.1, Cr 8, pH 7.27 on admission  - 9/16 HCO3 27 K 4.2  Cr 4.4  - s/p insulin and d50 and bicarb gtt  - valencia placed in ED  - patient is anuric with 100cc/12hours  - JOEY: no hydro, no stones, debris in bladder  - 9/16 d/c'd bicarb gtt 100cc/hr  -s/p R fem UDALL  Plan:  - HD will start today (likely patient will require BP medication during HD)    #Hypercalcemia--resolved  #h/o Intraductal papillary mucinous neoplasm of the pancreas (MRI in [facility])  - Ca 11.4 on admission--> 9/16 8.4  - possibly 2/2 malignancy  - ordered SPEP and UPEP, ionized Ca, PTHrp, PTH and phos per nephro.    #Nutrition  -patient is not appropriate for PO intake, NPO for now.  - will consider NG tube feeds if family is agreeable altough previous attempts to place NG tube have failed.    #DVT ppx: heparin subq  #GI ppx: not indicated  #Diet: NPO  #Dispo: SDU  Code: DNR/DNI  89 yo female w/ PMH of pancreatic mass, CAD, HTN presents for unresponsiveness found to have acute renal failure, metabolic acidosis, and UTI s/p UDALL for HD trial.    #UTI  #Sepsis Shock: fluid responsive  -BP low in the ED, was bolused (2.5L LR) did not require pressors.  - UA positive  - s/p cefepime and vanc in ED  -9/13 Ucx- >100,000 CFU/ml Klebsiella oxytoca/Raoutella ornithinolytica  -9/13 Blood cx: NGTD  Plan:  - f/u palliative consult  - c/w Rocephin  -f/u UCx sensitivities and blood cx    #Metabolic encephalopathy 2/2 acute renal failure  #Hyperkalemia with metabolic acidosis--resolved  #Uremia  - Bicarb 10, K 8.1, Cr 8, pH 7.27 on admission  - 9/16 HCO3 27 K 4.2  Cr 4.4  - s/p insulin and d50 and bicarb gtt  - valencia placed in ED  - patient is anuric with 100cc/12hours  - JOEY: no hydro, no stones, debris in bladder  - 9/16 d/c'd bicarb gtt 100cc/hr  -s/p R fem UDALL  Plan:  - HD will start today (likely patient will require BP medication during HD)    #Hypercalcemia--resolved  #h/o Intraductal papillary mucinous neoplasm of the pancreas (MRI in [facility])  - Ca 11.4 on admission--> 9/16 8.4  - possibly 2/2 malignancy  - ordered SPEP and UPEP, ionized Ca, PTHrp, PTH and phos per nephro.    #Nutrition  -patient is not appropriate for PO intake, NPO for now.  - will consider NG tube feeds if family is agreeable altough previous attempts to place NG tube have failed.    #DVT ppx: heparin subq  #GI ppx: not indicated  #Diet: NPO  #Dispo: SDU  Code: DNR/DNI  89 yo female w/ PMH of pancreatic mass, CAD, HTN presents for unresponsiveness found to have acute renal failure, metabolic acidosis, and UTI s/p UDALL for HD trial.    #UTI  #Sepsis Shock: fluid responsive  -BP low in the ED, was bolused (2.5L LR) did not require pressors.  - UA positive  - s/p cefepime and vanc in ED  -9/13 Ucx- >100,000 CFU/ml Klebsiella oxytoca/Raoutella ornithinolytica  -9/13 Blood cx: NGTD  Plan:  - f/u palliative consult  - c/w Rocephin  -f/u UCx sensitivities and blood cx    #Metabolic encephalopathy 2/2 acute renal failure  #Hyperkalemia with metabolic acidosis--resolved  #Uremia  - Bicarb 10, K 8.1, Cr 8, pH 7.27 on admission  - 9/16 HCO3 27 K 4.2  Cr 4.4  - s/p insulin and d50 and bicarb gtt  - valencia placed in ED  - patient is anuric with 100cc/12hours  - JOEY: no hydro, no stones, debris in bladder  - 9/16 d/c'd bicarb gtt 100cc/hr  -s/p R fem UDALL  Plan:  - HD will start today (likely patient will require BP medication during HD)    #Hypercalcemia--resolved  #h/o Intraductal papillary mucinous neoplasm of the pancreas (MRI in [facility])  - Ca 11.4 on admission--> 9/16 8.4  - possibly 2/2 malignancy  - ordered SPEP and UPEP, ionized Ca, PTHrp, PTH and phos per nephro.    #Nutrition  -patient is not appropriate for PO intake, NPO for now.  - Previous NG tube placement attempts failed  - 9/16 family is agreeable to NG tube placement will attempt today.    #DVT ppx: heparin subq  #GI ppx: not indicated  #Diet: NPO, pending NG tube placement  #Dispo: SDU  Code: DNR/DNI  89 yo female w/ PMH of pancreatic mass, CAD, HTN presents for unresponsiveness found to have acute renal failure, metabolic acidosis, and UTI s/p UDALL for HD trial.    #UTI  #Sepsis Shock: fluid responsive  -BP low in the ED, was bolused (2.5L LR) did not require pressors.  - UA positive  - s/p cefepime and vanc in ED  -9/13 Ucx- >100,000 CFU/ml Klebsiella oxytoca/Raoutella ornithinolytica  -9/13 Blood cx: NGTD  Plan:  - f/u palliative consult  - c/w Rocephin  -f/u UCx sensitivities and blood cx    #Metabolic encephalopathy 2/2 acute renal failure  #Hyperkalemia with metabolic acidosis--resolved  #Uremia  - Bicarb 10, K 8.1, Cr 8, pH 7.27 on admission  - 9/16 HCO3 27 K 4.2  Cr 4.4  - s/p insulin and d50 and bicarb gtt  - valencia placed in ED  - patient is anuric with 100cc/12hours  - JOEY: no hydro, no stones, debris in bladder  - 9/16 d/c'd bicarb gtt 100cc/hr  -s/p R fem UDALL  Plan:  - HD delayed by Nephro until tomorrow.    #Hypercalcemia--resolved  #h/o Intraductal papillary mucinous neoplasm of the pancreas (MRI in [facility])  - Ca 11.4 on admission--> 9/16 8.4  - possibly 2/2 malignancy  - ordered SPEP and UPEP, ionized Ca, PTHrp, PTH and phos per nephro.    #Nutrition  -patient is not appropriate for PO intake, NPO for now.  - Previous NG tube placement attempts failed  - 9/16 family is agreeable to NG tube placement will attempt today.    #DVT ppx: heparin subq  #GI ppx: not indicated  #Diet: NPO, pending NG tube placement  #Dispo: SDU  Code: DNR/DNI  89 yo female w/ PMH of pancreatic mass, CAD, HTN presents for unresponsiveness found to have acute renal failure, metabolic acidosis, and UTI s/p UDALL for HD trial.    #UTI  #Sepsis Shock: fluid responsive  -BP low in the ED, was bolused (2.5L LR) did not require pressors.  - UA positive  - s/p cefepime and vanc in ED  -9/13 Ucx- >100,000 CFU/ml Klebsiella oxytoca/Raoutella ornithinolytica  -9/13 Blood cx: NGTD  Plan:  - f/u palliative consult  - c/w Rocephin  -f/u UCx sensitivities and blood cx    #Metabolic encephalopathy 2/2 acute renal failure  #Hyperkalemia with metabolic acidosis--resolved  #Uremia  - Bicarb 10, K 8.1, Cr 8, pH 7.27 on admission  - 9/16 HCO3 27 K 4.2  Cr 4.4  - s/p insulin and d50 and bicarb gtt  - valencia placed in ED  - patient is anuric with 100cc/12hours  - JOEY: no hydro, no stones, debris in bladder  - 9/16 d/c'd bicarb gtt 100cc/hr  -s/p R fem UDALL  Plan:  - HD delayed by Nephro until tomorrow.    #Hypercalcemia--resolved  #h/o Intraductal papillary mucinous neoplasm of the pancreas (MRI in [facility])  - Ca 11.4 on admission--> 9/16 8.4  - possibly 2/2 malignancy  - ordered SPEP and UPEP, ionized Ca, PTHrp, PTH and phos per nephro.    #Nutrition  -patient is not appropriate for PO intake, NPO for now.  - Previous NG tube placement attempts failed  - 9/16 family is agreeable to NG tube placement will attempt today.    #DVT ppx: heparin subq  #GI ppx: not indicated  #Diet: NPO, pending NG tube placement  #Dispo: SDU  Code: DNR/DNI  89 yo female w/ PMH of Intraductal papillary mucinous neoplasm of the pancreas, CAD, HTN presents for unresponsiveness found to have acute renal failure, metabolic acidosis, and UTI s/p UDALL for HD trial now delayed until 9/17 per nephro.    #UTI  #Sepsis Shock: fluid responsive  -BP low in the ED, was bolused (2.5L LR) did not require pressors.  - UA positive  - s/p cefepime and vanc in ED  -9/13 Ucx- >100,000 CFU/ml Klebsiella oxytoca/Raoutella ornithinolytica  -9/13 Blood cx: NGTD  Plan:  - f/u palliative consult  - c/w Rocephin  -f/u UCx sensitivities and blood cx    #Metabolic encephalopathy 2/2 acute renal failure  #Hyperkalemia with metabolic acidosis--resolved  #Uremia  - Bicarb 10, K 8.1, Cr 8, pH 7.27 on admission  - 9/16 HCO3 27 K 4.2  Cr 4.4  - s/p insulin and d50 and bicarb gtt  - valencia placed in ED  - patient is anuric with 100cc/12hours  - JOEY: no hydro, no stones, debris in bladder  - 9/16 d/c'd bicarb gtt 100cc/hr  -s/p R fem UDALL  Plan:  - HD delayed by Nephro until tomorrow.    #Hypercalcemia--resolved  #h/o Intraductal papillary mucinous neoplasm of the pancreas (MRI in [facility])  - Ca 11.4 on admission--> 9/16 8.4  - possibly 2/2 malignancy  - ordered SPEP and UPEP, ionized Ca, PTHrp, PTH and phos per nephro.    #Nutrition  -patient is not appropriate for PO intake, NPO for now.  - Previous NG tube placement attempts failed  - 9/16 family is agreeable to NG tube placement will attempt today.    #DVT ppx: heparin subq  #GI ppx: not indicated  #Diet: NPO, pending NG tube placement  #Dispo: SDU  Code: DNR/DNI  89 yo female w/ PMH of Intraductal papillary mucinous neoplasm of the pancreas, CAD, HTN presents for unresponsiveness found to have acute renal failure, metabolic acidosis, and UTI s/p UDALL for HD trial now delayed until 9/17 per nephro.    #UTI  #Sepsis Shock: fluid responsive  -BP low in the ED, was bolused (2.5L LR) did not require pressors.  - UA positive  - s/p cefepime and vanc in ED  -9/13 Ucx- >100,000 CFU/ml Klebsiella oxytoca/Raoutella ornithinolytica  -9/13 Blood cx: NGTD  Plan:  - f/u palliative consult  - c/w Rocephin  -f/u UCx sensitivities and blood cx    #Metabolic encephalopathy 2/2 acute renal failure  #Hyperkalemia with metabolic acidosis--resolved  #Uremia  - Bicarb 10, K 8.1, Cr 8, pH 7.27 on admission  - 9/16 HCO3 27 K 4.2  Cr 4.4  - s/p insulin and d50 and bicarb gtt  - valencia placed in ED  - patient is anuric with 100cc/12hours  - JOEY: no hydro, no stones, debris in bladder  - 9/16 d/c'd bicarb gtt 100cc/hr  -s/p R fem UDALL  Plan:  - HD delayed by Nephro until tomorrow.    #Hypercalcemia--resolved  #h/o Intraductal papillary mucinous neoplasm of the pancreas (MRI in [facility])  - Ca 11.4 on admission--> 9/16 8.4  - possibly 2/2 malignancy  - ordered SPEP and UPEP, ionized Ca, PTHrp, PTH and phos per nephro.    #Nutrition  -patient is not appropriate for PO intake, NPO for now.  - Previous NG tube placement attempts failed  - 9/16 family is agreeable to NG tube placement pending confirmation    #DVT ppx: heparin subq  #GI ppx: not indicated  #Diet: NPO, f/u NG tube placement confirmation.  #Dispo: SDU  Code: DNR/DNI

## 2021-09-16 NOTE — CHART NOTE - NSCHARTNOTEFT_GEN_A_CORE
Called by resident for difficulty placing NGT. Pt admitted with worsening AMS (has dementia) and OSMAR - attempts were made to place NGT for medication and nutrition.    At the bedside, 2 attempts were made to pass a weighted kangaroo feeding tube, both nares obstructed posteriorly. FFL passed via nares showing significant irritation and erythema to the post NP with 1cm ulceration noted to the right NP and trauma to the eustachian tube orifice. NGT again attempted to pass, but patient now becoming agitated.    Resident & palliative care at the bedside, will have GOC discussion with the family to determine next steps. If family wants to proceed with NGT, will come back to attempt placement. To make note, I discussed with the team what the end goal was - if family not planning on placing a PEG tube, unsure of what the utility is in placing an NGT as a temporary fix as patients clinical status is baseline.

## 2021-09-16 NOTE — PROGRESS NOTE ADULT - SUBJECTIVE AND OBJECTIVE BOX
QUIN HERNADEZ             MRN-[id number]      Patient is a 89y old Female who presents with a chief complaint of acute renal failure (16 Sep 2021 09:00)    Currently admitted with the primary diagnosis of: acute renal failure, encephalopathy    today unsuccessful attempts at NG insertion causing distress      ROS:  UNABLE TO OBTAIN  due to: responds by moaning returns to resting state with non-pharm interventions    DYSPNEA:  N	  NAUS/VOM: N	  SECRETIONS:  N	  AGITATION: N  Pain (Y/N):     with procedures - when left alone returns to resting state  -Provocation/Palliation:  -Quality/Quantity:  -Radiating:  -Severity:  -Timing/Frequency:  -Impact on ADLs:      PEx:   T(C): 36 (09-16-21 @ 16:00), Max: 36.3 (09-15-21 @ 20:32)  HR: 125 (09-16-21 @ 16:00) (80 - 125)  BP: 88/53 (09-16-21 @ 16:00) (88/53 - 118/57)  RR: 18 (09-16-21 @ 16:00) (17 - 19)  SpO2: 97% (09-16-21 @ 16:00) (97% - 98%)  Wt(kg): --            General:  several rounds found in bed in NAD after above  Eyes:  PER   ENMT: no external oral ulcers, moist  CVS: not tachy when resting  Resp: Unlabored Non tachypneic No increased WOB  GI: ND  :  Shankar  Musc: No C/C/slE    Neuro: unable to Follow commands   Psych: Calm when left alone, Alert at times not oriented    Skin: Non jaundiced ,   Last BM: 9/14 emr    ALLERGIES: aspirin (Unknown)  penicillin (Unknown)  shellfish (Unknown)            Labs:	    CBC:                        10.8   13.92 )-----------( 192      ( 16 Sep 2021 05:45 )             32.3     CMP:    09-16    146  |  104  |  135<HH>  ----------------------------<  109<H>  4.2   |  27  |  4.4<HH>    Ca    8.4<L>      16 Sep 2021 05:45  Phos  4.1     09-15  Mg     2.3     09-16    TPro  5.4<L>  /  Alb  2.9<L>  /  TBili  0.2  /  DBili  x   /  AST  41  /  ALT  40  /  AlkPhos  98  09-16       PT/INR - ( 15 Sep 2021 11:14 )   PT: 13.30 sec;   INR: 1.16 ratio              RADIOLOGY    < from: Xray Chest 1 View-PORTABLE IMMEDIATE (Xray Chest 1 View-PORTABLE IMMEDIATE .) (09.16.21 @ 10:58) >    EXAM:  XR CHEST PORTABLE IMMED 1V            PROCEDURE DATE:  09/16/2021            INTERPRETATION:  Clinical History / Reason for exam: NGT.    Comparison : Chest radiograph September 15, 2021.    Technique/Positioning: Adequate.    Findings:    Support devices: None.    Cardiac/mediastinum/hilum: Unremarkable.    Lung parenchyma/Pleura: Within normal limits.    Skeleton/soft tissues: Old right-sided rib fractures as well as degenerative changes of the axial skeleton.    Impression:    No radiographic evidence of acute cardiopulmonary disease.    Bones as above.    --- End of Report ---              ANNEMARIE SANCHEZ MD; Attending Radiologist  This document has been electronically signed. Sep 16 2021 11:17AM    < end of copied text >        EKG  12 Lead ECG:   Ventricular Rate 89 BPM    Atrial Rate 89 BPM    P-R Interval 142 ms    QRS Duration 84 ms    Q-T Interval 340 ms    QTC Calculation(Bazett) 413 ms    P Axis 58 degrees    R Axis -1 degrees    T Axis 54 degrees    Diagnosis Line Normal sinus rhythm  Normal ECG    Confirmed by PAULINA MEJIA MD ([id number]) on 9/13/2021 10:59:38 PM (09-13-21 @ 17:12)      Imaging Personally Reviewed:  [ x] YES  [ ] NO    Consultant(s) Notes Reviewed:  [x] YES  [ ] NO  Care Discussed with Consultants/Other Providers [x ] YES  [ ] NO    Medications:	      MEDICATIONS  (STANDING):  alteplase    CDT Bolus. 4 milliGRAM(s) IntraCatheter. once  cefTRIAXone   IVPB 1000 milliGRAM(s) IV Intermittent every 24 hours  heparin   Injectable 5000 Unit(s) SubCutaneous every 12 hours  lactated ringers. 1000 milliLiter(s) (50 mL/Hr) IV Continuous <Continuous>  midodrine 10 milliGRAM(s) Oral every 8 hours    MEDICATIONS  (PRN):        ADVANCED DIRECTIVES:            FULL CODE            DNR DNI            LIVING WILL            DPOA       HCP       MOLST    DECISION MAKER: Patient [  ]  Family [  ]  Other [  ] _______  LEGAL SURROGATE:      GOALS OF CARE DISCUSSION       Palliative care info/counseling provided	           Family meeting scheduled 	           Documentation of GOC/Advanced Care Planning:       See previous Palliative Medicine Note    PSYCHOSOCIAL-SPIRITUAL ASSESSMENT:       Reviewed       See Palliative Care SW/ documentation      CURRENT DISPO PLAN:         WILL REMAIN IN HOSPITAL      [facility]      Hospice      Home      REFERRALS	        Palliative Med        Unit SW/Case Mgmt              Hospice

## 2021-09-16 NOTE — PROGRESS NOTE ADULT - SUBJECTIVE AND OBJECTIVE BOX
VASCULAR SURGERY PROGRESS NOTE    Hospital Day # 4    Procedure: S/P right fem udall    Events of past 24 hours: No acute events overnight.       ROS otherwise negative except per subjective and HPI      PAST MEDICAL & SURGICAL HISTORY:      Vital Signs Last 24 Hrs  T(C): 36 (16 Sep 2021 16:00), Max: 36.3 (15 Sep 2021 20:32)  T(F): 96.8 (16 Sep 2021 16:00), Max: 97.4 (15 Sep 2021 20:32)  HR: 125 (16 Sep 2021 16:00) (80 - 125)  BP: 88/53 (16 Sep 2021 16:00) (88/53 - 118/57)  BP(mean): 65 (16 Sep 2021 16:00) (65 - 74)  RR: 18 (16 Sep 2021 16:00) (17 - 19)  SpO2: 97% (16 Sep 2021 16:00) (97% - 98%)    Pain (0-10):            Pain Control Adequate: [] YES [] N    Diet:    I&O's Detail    15 Sep 2021 07:01  -  16 Sep 2021 07:00  --------------------------------------------------------  IN:    Sodium Bicarbonate: 2850 mL    sodium chloride 0.45%: 500 mL  Total IN: 3350 mL    OUT:    Indwelling Catheter - Urethral (mL): 50 mL    Voided (mL): 140 mL  Total OUT: 190 mL    Total NET: 3160 mL      16 Sep 2021 07:01  -  16 Sep 2021 16:55  --------------------------------------------------------  IN:    Sodium Bicarbonate: 100 mL  Total IN: 100 mL    OUT:    Indwelling Catheter - Urethral (mL): 800 mL  Total OUT: 800 mL    Total NET: -700 mL      PHYSICAL EXAM    Appearance: Normal	  HEENT:   Normal oral mucosa, PERRL, EOMI	  Neck: Supple, - JVD; Carotid Bruit   Cardiovascular: Normal S1 S2, No JVD, No murmurs,   Respiratory: Lungs clear to auscultation/Decreased Breath Sounds/No Rales, Rhonchi, Wheezing	  Gastrointestinal:  Soft, Non-tender, + BS	  Skin: No rashes, No ecchymoses, No cyanosis  Extremities: Normal range of motion, No clubbing, cyanosis or edema. Right fem udall in place         MEDICATIONS:   MEDICATIONS  (STANDING):  alteplase    CDT Bolus. 4 milliGRAM(s) IntraCatheter. once  cefTRIAXone   IVPB 1000 milliGRAM(s) IV Intermittent every 24 hours  heparin   Injectable 5000 Unit(s) SubCutaneous every 12 hours  lactated ringers. 1000 milliLiter(s) (50 mL/Hr) IV Continuous <Continuous>  midodrine 10 milliGRAM(s) Oral every 8 hours    MEDICATIONS  (PRN):      LAB/STUDIES:                        10.8   13.92 )-----------( 192      ( 16 Sep 2021 05:45 )             32.3     09-16    146  |  104  |  135<HH>  ----------------------------<  109<H>  4.2   |  27  |  4.4<HH>    Ca    8.4<L>      16 Sep 2021 05:45  Phos  4.1     09-15  Mg     2.3     09-16    TPro  5.4<L>  /  Alb  2.9<L>  /  TBili  0.2  /  DBili  x   /  AST  41  /  ALT  40  /  AlkPhos  98  09-16    PT/INR - ( 15 Sep 2021 11:14 )   PT: 13.30 sec;   INR: 1.16 ratio           LIVER FUNCTIONS - ( 16 Sep 2021 05:45 )  Alb: 2.9 g/dL / Pro: 5.4 g/dL / ALK PHOS: 98 U/L / ALT: 40 U/L / AST: 41 U/L / GGT: x             Culture - Urine (collected 13 Sep 2021 19:20)  Source: Clean Catch Clean Catch (Midstream)  Final Report (16 Sep 2021 11:38):    >100,000 CFU/ml Klebsiella oxytoca/Raoutella ornithinolytica  Organism: Klebsiella oxytoca /Raoutella ornithinolytica (16 Sep 2021 11:38)  Organism: Klebsiella oxytoca /Raoutella ornithinolytica (16 Sep 2021 11:38)    Culture - Blood (collected 13 Sep 2021 19:20)  Source: .Blood Blood-Peripheral  Preliminary Report (15 Sep 2021 02:01):    No growth to date.    Culture - Blood (collected 13 Sep 2021 19:20)  Source: .Blood Blood-Peripheral  Preliminary Report (15 Sep 2021 02:01):    No growth to date.    ASSESSMENT:  Patient had a right femoral udall placed on 9/15. No hematoma present.     PLAN:  - Recall vascular if needed.   - Ok to be used for HD.         VASCULAR TEAM SPECTRA: [id number]

## 2021-09-16 NOTE — CHART NOTE - NSCHARTNOTEFT_GEN_A_CORE
Patient's valencia flushed and 250cc of urine immediately filled the valencia bag. Will bladder scan at 4pm if still retaining will plan to replace valencia. Discussed with BOB Christensen

## 2021-09-16 NOTE — CHART NOTE - NSCHARTNOTEFT_GEN_A_CORE
PALLIATIVE MEDICINE INTERDISCIPLINARY TEAM NOTE    Provider:  [  x ]Social Work   [   ]          [ x  ] Initial visit [   ] Follow up    Family or contact name / phone #   Met with: [  x ] Patient  [   ] Family  [   ] Other:    Primary Language: [ x  ] English [   ] Other*:                      *Interpretation provided by:    SUPPORT DIAGNOSES            (Check all that apply)  [   ] Psychosocial spiritual assessment (PSSA)  [   ] EOL issues  [   ] Cultural / spiritual concerns  [   ] Pain / suffering  [ x  ] Dementia / AMS  [   ] Other:  [   ] AD issues  [   ] Grief / loss / sadness  [   ] Discharge issues  [  x ] Distress / coping    PSYCHOSOCIAL ASSESSMENT OF PATIENT         (Check all that apply)  [  x ] Initial Assessment            [   ] Reassessment          [   ] Not Applicable this visit    Pain/suffering acuity:  [   ] None to mild (0-3)           [   ] Moderate (4-6)        [x   ] High (7-10)    Mental Status:  [   ] Alert/oriented (x3)          [ x  ] Confused/Altered(x2/x1)         [   ] Non-resp    Functional status:  [   ] Independent w ADLs      [   ] Needs Assistance             [  x ] Bedbound/Full Care    Coping:  unable to determine  [   ] Coping well                     [   ] Coping w/difficulty            [   ] Poor coping    Support system:   unable to determine  [   ] Strong                              [   ] Adequate                        [   ] Inadequate      Past history and medications for:   unknown    [ ] Anxiety       [ ] Depression    [ ] Sleep disorders     SPIRITUAL ASSESSMENT  Oriental orthodox/Spiritual practice: ___________________________    Role of organized Jainism:  [   ] Important                     [   ] Some (fam tradition, cultural)               [   ] None    Effects on medical care:  [   ] Yes, _____________________________________                         [   ] None    Cultural/Gnosticist need:  [   ] Yes, _____________________________________                         [   ] None    Refer to Pastoral Care:  [   ] Yes           [   ] No, not at this time    SERVICE PROVIDED  [   ]PSSA                                                                             [   ]Discharge support / facilitation  [   ]AD / goals of care counseling                                  [   ]EOL / death / bereavement counseling  [x   ]Counseling / support                                                [   ] Family meeting  [   ]Prayer / sacrament / ritual                                      [   ] Referral   [   ]Other                                                                       NOTE and Plan of Care (PoC):    Patient is a 89 year old, female.  Patient was admitted on 9/13/21 from [facility] (SNF, on 9/13/21, dx:  AMS.    Patient was observed to be agitated.  Patient called out repeatedly.  Residents came to bedside to check on NG tube.  Resident informed writer that sons were coming in today at 1:30 pm.  Writer planned on meeting with family with Delilah Snyder NP, Palliative Care.  Sons did not arrive as of now (2:40 pm).  Writer will call them at 3:00 if they do not show up.

## 2021-09-16 NOTE — PROGRESS NOTE ADULT - ASSESSMENT
IMPRESSION:    Altered MS/ toxic metabolic  Acute on chronic renal failure  metabolic  acidosis  pancreatic mass  UTI      PLAN:    CNS: Avoid CNS depressant    HEENT:  Oral care    PULMONARY:  HOB @ 45 degrees, Aspiration precaution, keep Sao2 88 to 94%, CXR reviewed    CARDIOVASCULAR: Bicarb drip dc after HD    GI: GI prophylaxis                                          Feeding if needed NGT    RENAL:  F/u  lytes.  Correct as needed. accurate I/O, Renal us, FLUSH CLEMENTE    INFECTIOUS DISEASE: IV ABX,    HEMATOLOGICAL:  DVT prophylaxis.    ENDOCRINE:  Follow up FS.  Insulin protocol if needed.    poor prognosis

## 2021-09-16 NOTE — PROGRESS NOTE ADULT - ASSESSMENT
89 yo female w/ PMH of pancreatic mass, CAD, HTN presents for unresponsiveness.     Renal consulted for OSMAR with hyperkalemia, met acidosis and hypercalcemia.    #OSMAR - likely ATN, oliguric   - send UA, Urine Na creat   -  kidney and bladder sono noted will need to switch catheter   - DC bicarb drip and start LR  at 75 cc per hour   - strict Is and OS  creat is essentially unchanged  OUR TEAM SPOKE with  Son, Musa who is insisting on trial of HD despite the risk of hypotension and arrythmia. He understands that HD is not going to change the overall prognosis of the pt. He agreed for a trial of HD and signed the consent.  - [place] placement done   - please start Midodrine 10 mg q8h  will hold HD today and assess in AM/ no urgency   #Metabolic encephalopathy 2/2 acute renal failure and dehydration  #Hyperkalemia with metabolic acidosis  -resolving     #Hypercalcemia  - Ca 11.4, elevated protein/ REPEAT 9  - possibly 2/2 malignancy and dehydration  - ordered SPEP and UPEP, ionized Ca, PTHrp, PTH and phos  - calcium is improving now  #UTI - UA positive  - started on  Rocephin   D/W medical team

## 2021-09-16 NOTE — PROGRESS NOTE ADULT - ASSESSMENT
89[facility] with AMS was A&Ox1 at baseline being treated for urosepsis and OSMAR being followed for ongoing support with  [facility].     Patient seen at bedside. She is unable to participate in exam.    Set up time at 130 and then 330 - family did not show untl 350. Spoke to patient's son, Jesus, in collaboration with Dr. Whaley.  He noted that he had spoken with the nephrologist who is now not offering dialysis, and he and his wife expressing family dynamics that are challenging as previously noted. Encouraged to focus on mom's express wishes - comfort and no tubes; and the big picture rather than lab values. Educated about NGT attempts causing distress.   We discussed that sometimes dialysis can be looked at as tubes.     They need to take the night and we will revisit tomorros        MEDD (morphine equivalent daily dose): na      See Recs below. Discussed with primary and palliative teams    Please call [phone] with questions or concerns 24/7.   We will continue to follow.     15 minutes spent on advance care planning.

## 2021-09-16 NOTE — PROGRESS NOTE ADULT - SUBJECTIVE AND OBJECTIVE BOX
Nephrology progress note    THIS IS AN INCOMPLETE NOTE . FULL NOTE TO FOLLOW SHORTLY    Patient is seen and examined, events over the last 24 h noted .    Allergies:  aspirin (Unknown)  penicillin (Unknown)  shellfish (Unknown)    Hospital Medications:   MEDICATIONS  (STANDING):  alteplase    CDT Bolus. 4 milliGRAM(s) IntraCatheter. once  cefTRIAXone   IVPB 1000 milliGRAM(s) IV Intermittent every 24 hours  heparin   Injectable 5000 Unit(s) SubCutaneous every 12 hours  midodrine 10 milliGRAM(s) Oral every 8 hours        VITALS:  T(F): 96.9 (21 @ 07:45), Max: 97.4 (09-15-21 @ 20:32)  HR: 92 (21 @ 07:45)  BP: 118/57 (21 @ 07:45)  RR: 18 (21 @ 07:45)  SpO2: 98% (21 @ 07:45)  Wt(kg): --     @ 07:01  -  09-15 @ 07:00  --------------------------------------------------------  IN: 2000 mL / OUT: 100 mL / NET: 1900 mL    09-15 @ 07:01  -   @ 07:00  --------------------------------------------------------  IN: 3350 mL / OUT: 190 mL / NET: 3160 mL     @ 07:01  -   @ 09:00  --------------------------------------------------------  IN: 100 mL / OUT: 0 mL / NET: 100 mL          PHYSICAL EXAM:  Constitutional: NAD  HEENT: anicteric sclera, oropharynx clear, MMM  Neck: No JVD  Respiratory: CTAB, no wheezes, rales or rhonchi  Cardiovascular: S1, S2, RRR  Gastrointestinal: BS+, soft, NT/ND  Extremities: No cyanosis or clubbing. No peripheral edema  :  No valencia.   Skin: No rashes    LABS:      146  |  104  |  135<HH>  ----------------------------<  109<H>  4.2   |  27  |  4.4<HH>    Potassium Trend: 4.2<--, 4.1<--, tnp<--, 4.9<--, 5.5<--    Creatinine Trend: 4.4<--, 5.5<--, 6.5<--, 6.5<--, 7.0<--, 5.7<--    Ca    8.4<L>      16 Sep 2021 05:45  Phos  4.1     09-15  Mg     2.3         TPro  5.4<L>  /  Alb  2.9<L>  /  TBili  0.2  /  DBili      /  AST  41  /  ALT  40  /  AlkPhos  98                            10.8   13.92 )-----------( 192      ( 16 Sep 2021 05:45 )             32.3       Urine Studies:  Urinalysis Basic - ( 13 Sep 2021 19:20 )    Color: Yellow / Appearance: Turbid / S.017 / pH:   Gluc:  / Ketone: Negative  / Bili: Negative / Urobili: <2 mg/dL   Blood:  / Protein: 300 mg/dL / Nitrite: Negative   Leuk Esterase: Large / RBC: 2 /HPF / WBC >720 /HPF   Sq Epi:  / Non Sq Epi: 1 /HPF / Bacteria: Moderate        RADIOLOGY & ADDITIONAL STUDIES:   Nephrology progress note  Patient is seen and examined, events over the last 24 h noted .  lying in bed confused not talkative     Allergies:  aspirin (Unknown)  penicillin (Unknown)  shellfish (Unknown)    Hospital Medications:   MEDICATIONS  (STANDING):  alteplase    CDT Bolus. 4 milliGRAM(s) IntraCatheter. once  cefTRIAXone   IVPB 1000 milliGRAM(s) IV Intermittent every 24 hours  heparin   Injectable 5000 Unit(s) SubCutaneous every 12 hours  midodrine 10 milliGRAM(s) Oral every 8 hours        VITALS:  T(F): 96.9 (21 @ 07:45), Max: 97.4 (09-15-21 @ 20:32)  HR: 92 (21 @ 07:45)  BP: 118/57 (21 @ 07:45)  RR: 18 (21 @ 07:45)  SpO2: 98% (21 @ 07:45)       @ 07:  -  09-15 @ 07:00  --------------------------------------------------------  IN: 2000 mL / OUT: 100 mL / NET: 1900 mL    09-15 @ 07:  -   @ 07:00  --------------------------------------------------------  IN: 3350 mL / OUT: 190 mL / NET: 3160 mL     @ 07:  -   @ 09:00  --------------------------------------------------------  IN: 100 mL / OUT: 0 mL / NET: 100 mL          PHYSICAL EXAM:  Constitutional: confused   Neck: No JVD  Respiratory: CTAB,   Cardiovascular: S1, S2, RRR  Gastrointestinal: BS+, soft, NT/ND  Extremities: No cyanosis or clubbing. No peripheral edema  :  No valencia.   Skin: No rashes    LABS:      146  |  104  |  135<HH>  ----------------------------<  109<H>  4.2   |  27  |  4.4<HH>    Potassium Trend: 4.2<--, 4.1<--, tnp<--, 4.9<--, 5.5<--    Creatinine Trend: 4.4<--, 5.5<--, 6.5<--, 6.5<--, 7.0<--, 5.7<--    Ca    8.4<L>      16 Sep 2021 05:45  Phos  4.1     09-15  Mg     2.3         TPro  5.4<L>  /  Alb  2.9<L>  /  TBili  0.2  /  DBili      /  AST  41  /  ALT  40  /  AlkPhos  98                            10.8   13.92 )-----------( 192      ( 16 Sep 2021 05:45 )             32.3       Urine Studies:  Urinalysis Basic - ( 13 Sep 2021 19:20 )    Color: Yellow / Appearance: Turbid / S.017 / pH:   Gluc:  / Ketone: Negative  / Bili: Negative / Urobili: <2 mg/dL   Blood:  / Protein: 300 mg/dL / Nitrite: Negative   Leuk Esterase: Large / RBC: 2 /HPF / WBC >720 /HPF   Sq Epi:  / Non Sq Epi: 1 /HPF / Bacteria: Moderate        RADIOLOGY & ADDITIONAL STUDIES:  < from: US Renal (09.15.21 @ 17:26) >  IMPRESSION:  1.  No hydronephrosis bilaterally.  2.  Partially distended urinary bladder containing 400 mL of urine, despite presence of a Valencia catheter. Correlate for catheter function.  3.  Nonspecific debris within the bladder lumen. Correlate with urinalysi    < end of copied text >

## 2021-09-17 DIAGNOSIS — Z71.89 OTHER SPECIFIED COUNSELING: ICD-10-CM

## 2021-09-17 LAB
ALBUMIN SERPL ELPH-MCNC: 3 G/DL — LOW (ref 3.5–5.2)
ALP SERPL-CCNC: 107 U/L — SIGNIFICANT CHANGE UP (ref 30–115)
ALT FLD-CCNC: 34 U/L — SIGNIFICANT CHANGE UP (ref 0–41)
ANION GAP SERPL CALC-SCNC: 14 MMOL/L — SIGNIFICANT CHANGE UP (ref 7–14)
AST SERPL-CCNC: 32 U/L — SIGNIFICANT CHANGE UP (ref 0–41)
BASOPHILS # BLD AUTO: 0.02 K/UL — SIGNIFICANT CHANGE UP (ref 0–0.2)
BASOPHILS NFR BLD AUTO: 0.2 % — SIGNIFICANT CHANGE UP (ref 0–1)
BILIRUB SERPL-MCNC: 0.2 MG/DL — SIGNIFICANT CHANGE UP (ref 0.2–1.2)
BUN SERPL-MCNC: 112 MG/DL — CRITICAL HIGH (ref 10–20)
CALCIUM SERPL-MCNC: 8.4 MG/DL — LOW (ref 8.5–10.1)
CHLORIDE SERPL-SCNC: 106 MMOL/L — SIGNIFICANT CHANGE UP (ref 98–110)
CO2 SERPL-SCNC: 32 MMOL/L — SIGNIFICANT CHANGE UP (ref 17–32)
CREAT SERPL-MCNC: 3 MG/DL — HIGH (ref 0.7–1.5)
EOSINOPHIL # BLD AUTO: 0.45 K/UL — SIGNIFICANT CHANGE UP (ref 0–0.7)
EOSINOPHIL NFR BLD AUTO: 3.9 % — SIGNIFICANT CHANGE UP (ref 0–8)
GLUCOSE BLDC GLUCOMTR-MCNC: 88 MG/DL — SIGNIFICANT CHANGE UP (ref 70–99)
GLUCOSE BLDC GLUCOMTR-MCNC: 94 MG/DL — SIGNIFICANT CHANGE UP (ref 70–99)
GLUCOSE SERPL-MCNC: 98 MG/DL — SIGNIFICANT CHANGE UP (ref 70–99)
HCT VFR BLD CALC: 32.9 % — LOW (ref 37–47)
HGB BLD-MCNC: 10.2 G/DL — LOW (ref 12–16)
IMM GRANULOCYTES NFR BLD AUTO: 1.8 % — HIGH (ref 0.1–0.3)
LACTATE SERPL-SCNC: 1.4 MMOL/L — SIGNIFICANT CHANGE UP (ref 0.7–2)
LYMPHOCYTES # BLD AUTO: 1.7 K/UL — SIGNIFICANT CHANGE UP (ref 1.2–3.4)
LYMPHOCYTES # BLD AUTO: 14.7 % — LOW (ref 20.5–51.1)
MAGNESIUM SERPL-MCNC: 2.2 MG/DL — SIGNIFICANT CHANGE UP (ref 1.8–2.4)
MCHC RBC-ENTMCNC: 30 PG — SIGNIFICANT CHANGE UP (ref 27–31)
MCHC RBC-ENTMCNC: 31 G/DL — LOW (ref 32–37)
MCV RBC AUTO: 96.8 FL — SIGNIFICANT CHANGE UP (ref 81–99)
MONOCYTES # BLD AUTO: 0.62 K/UL — HIGH (ref 0.1–0.6)
MONOCYTES NFR BLD AUTO: 5.4 % — SIGNIFICANT CHANGE UP (ref 1.7–9.3)
NEUTROPHILS # BLD AUTO: 8.58 K/UL — HIGH (ref 1.4–6.5)
NEUTROPHILS NFR BLD AUTO: 74 % — SIGNIFICANT CHANGE UP (ref 42.2–75.2)
NRBC # BLD: 0 /100 WBCS — SIGNIFICANT CHANGE UP (ref 0–0)
PLATELET # BLD AUTO: 161 K/UL — SIGNIFICANT CHANGE UP (ref 130–400)
POTASSIUM SERPL-MCNC: 3.2 MMOL/L — LOW (ref 3.5–5)
POTASSIUM SERPL-SCNC: 3.2 MMOL/L — LOW (ref 3.5–5)
PROT SERPL-MCNC: 5.2 G/DL — LOW (ref 6–8)
RBC # BLD: 3.4 M/UL — LOW (ref 4.2–5.4)
RBC # FLD: 13.8 % — SIGNIFICANT CHANGE UP (ref 11.5–14.5)
SODIUM SERPL-SCNC: 152 MMOL/L — HIGH (ref 135–146)
WBC # BLD: 11.58 K/UL — HIGH (ref 4.8–10.8)
WBC # FLD AUTO: 11.58 K/UL — HIGH (ref 4.8–10.8)

## 2021-09-17 PROCEDURE — 99232 SBSQ HOSP IP/OBS MODERATE 35: CPT

## 2021-09-17 PROCEDURE — 99497 ADVNCD CARE PLAN 30 MIN: CPT | Mod: 25

## 2021-09-17 PROCEDURE — 99233 SBSQ HOSP IP/OBS HIGH 50: CPT

## 2021-09-17 RX ORDER — POTASSIUM CHLORIDE 20 MEQ
20 PACKET (EA) ORAL
Refills: 0 | Status: COMPLETED | OUTPATIENT
Start: 2021-09-17 | End: 2021-09-17

## 2021-09-17 RX ADMIN — Medication 50 MILLIEQUIVALENT(S): at 17:48

## 2021-09-17 RX ADMIN — HEPARIN SODIUM 5000 UNIT(S): 5000 INJECTION INTRAVENOUS; SUBCUTANEOUS at 05:14

## 2021-09-17 RX ADMIN — CEFTRIAXONE 100 MILLIGRAM(S): 500 INJECTION, POWDER, FOR SOLUTION INTRAMUSCULAR; INTRAVENOUS at 06:20

## 2021-09-17 RX ADMIN — MIDODRINE HYDROCHLORIDE 10 MILLIGRAM(S): 2.5 TABLET ORAL at 21:06

## 2021-09-17 RX ADMIN — Medication 50 MILLIEQUIVALENT(S): at 15:19

## 2021-09-17 RX ADMIN — HEPARIN SODIUM 5000 UNIT(S): 5000 INJECTION INTRAVENOUS; SUBCUTANEOUS at 17:49

## 2021-09-17 RX ADMIN — MIDODRINE HYDROCHLORIDE 10 MILLIGRAM(S): 2.5 TABLET ORAL at 14:26

## 2021-09-17 NOTE — PROGRESS NOTE ADULT - PROBLEM SELECTOR PLAN 5
-  In sum, continue current medical plan with DNR/I  - Family wishes more information re: advance dementia and artifical feeding.   - emailed ACP videos

## 2021-09-17 NOTE — SWALLOW BEDSIDE ASSESSMENT ADULT - COMMENTS
Pt from [facility] on regular consistency diet w/ thin liquids +toleration w/o overt s/s of penetration/aspiration w/ puree and nectar thick liquids

## 2021-09-17 NOTE — PROGRESS NOTE ADULT - ATTENDING COMMENTS
Pt admitted for Acute Renal Failure (apparently no h/x CKD per family), Pyrurea, Urosepsis, Metabolic Encephalopathy  - c/w Ceftriaxone to complete course   - TOV to d/c valencia   - IVF gentle  - Family does not want HD at this time  - DNR/DNI   - Palliative following   - Awaiting Family Decision on CMO status (not yet decided by family)   - Poor Prognosis     Dispo: Acute / f/u above c/w IV Abx / daily BMP/CBC

## 2021-09-17 NOTE — DIETITIAN INITIAL EVALUATION ADULT. - NAME AND PHONE
Nutrition Intervention: EN; Nutrition Monitoring:diet order,energy intake,body composition,NFPF, renal/glucose profile

## 2021-09-17 NOTE — DIETITIAN INITIAL EVALUATION ADULT. - PHYSCIAL ASSESSMENT
Confused/ AAOX4 to person; No edema; GI: fecal incontinence, LBM 9/17; skin: rash; no other wts in EMR -- no ht in EMR -- will attempt to obtain hx at f/u when GOC are more clear

## 2021-09-17 NOTE — PROGRESS NOTE ADULT - SUBJECTIVE AND OBJECTIVE BOX
Over Night Events: events noted, palliative/ renal/ ENT reviewed      PHYSICAL EXAM    ICU Vital Signs Last 24 Hrs  T(C): 36.1 (17 Sep 2021 05:12), Max: 36.7 (16 Sep 2021 20:00)  T(F): 97 (17 Sep 2021 05:12), Max: 98.1 (16 Sep 2021 20:00)  HR: 73 (17 Sep 2021 05:12) (73 - 125)  BP: 99/56 (17 Sep 2021 05:12) (88/53 - 118/57)  RR: 17 (17 Sep 2021 05:12) (17 - 18)  SpO2: 99% (17 Sep 2021 05:12) (97% - 99%)      General: ill looking  HEENT: NUSRAT             Lymph Nodes: No cervical LN   Lungs: Bdec bs both bases  Cardiovascular: Regular   Abdomen: Soft, Positive BS  Extremities: No clubbing   Skin: Warm  Neurological: Not following commands      09-15-21 @ 07:01  -  09-16-21 @ 07:00  --------------------------------------------------------  IN:    Sodium Bicarbonate: 2850 mL    sodium chloride 0.45%: 500 mL  Total IN: 3350 mL    OUT:    Indwelling Catheter - Urethral (mL): 50 mL    Voided (mL): 140 mL  Total OUT: 190 mL    Total NET: 3160 mL      09-16-21 @ 07:01  -  09-17-21 @ 06:47  --------------------------------------------------------  IN:    Sodium Bicarbonate: 100 mL  Total IN: 100 mL    OUT:    Indwelling Catheter - Urethral (mL): 1200 mL  Total OUT: 1200 mL    Total NET: -1100 mL          LABS:                          10.8   13.92 )-----------( 192      ( 16 Sep 2021 05:45 )             32.3                                               09-16    146  |  104  |  135<HH>  ----------------------------<  109<H>  4.2   |  27  |  4.4<HH>    Ca    8.4<L>      16 Sep 2021 05:45  Phos  4.1     09-15  Mg     2.3     09-16    TPro  5.4<L>  /  Alb  2.9<L>  /  TBili  0.2  /  DBili  x   /  AST  41  /  ALT  40  /  AlkPhos  98  09-16      PT/INR - ( 15 Sep 2021 11:14 )   PT: 13.30 sec;   INR: 1.16 ratio                                                                                              LIVER FUNCTIONS - ( 16 Sep 2021 05:45 )  Alb: 2.9 g/dL / Pro: 5.4 g/dL / ALK PHOS: 98 U/L / ALT: 40 U/L / AST: 41 U/L / GGT: x                                                                                                                                       MEDICATIONS  (STANDING):  alteplase    CDT Bolus. 4 milliGRAM(s) IntraCatheter. once  cefTRIAXone   IVPB 1000 milliGRAM(s) IV Intermittent every 24 hours  dextrose 5%. 1000 milliLiter(s) (50 mL/Hr) IV Continuous <Continuous>  heparin   Injectable 5000 Unit(s) SubCutaneous every 12 hours  midodrine 10 milliGRAM(s) Oral every 8 hours

## 2021-09-17 NOTE — PROGRESS NOTE ADULT - ASSESSMENT
IMPRESSION:    Altered MS/ toxic metabolic  Acute on chronic renal failure/ HD  metabolic  acidosis resolved  pancreatic mass  UTI      PLAN:    CNS: Avoid CNS depressant    HEENT:  Oral care    PULMONARY:  HOB @ 45 degrees, Aspiration precaution, keep Sao2 88 to 94%    CARDIOVASCULAR: dc IVF    GI: GI prophylaxis                                          Feeding NGT    RENAL:  F/u  lytes.  Correct as needed. accurate I/O, Renal us reviewed    INFECTIOUS DISEASE: IV ABX finish course    HEMATOLOGICAL:  DVT prophylaxis.    ENDOCRINE:  Follow up FS.  Insulin protocol if needed.    poor prognosis    floor

## 2021-09-17 NOTE — DIETITIAN INITIAL EVALUATION ADULT. - PERTINENT MEDS FT
MEDICATIONS  (STANDING):  alteplase    CDT Bolus. 4 milliGRAM(s) IntraCatheter. once  cefTRIAXone   IVPB 1000 milliGRAM(s) IV Intermittent every 24 hours  dextrose 5%. 1000 milliLiter(s) (50 mL/Hr) IV Continuous <Continuous>  heparin   Injectable 5000 Unit(s) SubCutaneous every 12 hours  midodrine 10 milliGRAM(s) Oral every 8 hours

## 2021-09-17 NOTE — PROGRESS NOTE ADULT - ASSESSMENT
89[facility] with [facility] was A&Ox1 at baseline being treated for urosepsis and OSMAR (currently not needing HD) being followed for ongoing support with  [facility].     Patient seen at bedside. Limited ability to participate in exam.    At primary team's request met with family (see above for details). In sum, continue current medical plan with DNR/I      MEDD (morphine equivalent daily dose): na      See Recs below. Discussed with primary and palliative teams    Please call [phone] with questions or concerns 24/7.   We will continue to follow.

## 2021-09-17 NOTE — DIETITIAN INITIAL EVALUATION ADULT. - ADD RECOMMEND
1. Will cont to monitor GOC and plan for NGT. 2. Will adjust needs above if HD to initiate. 3. if aligns w/ GOC to initiate NGT feeds -- consult RD for reccs. 4. If unable to place NGT and family agreeable to alternate means of hydration -- consider NST c/s for PPN/TPN. 1. Will cont to monitor GOC and plan for NGT. 2. Will adjust needs above if HD to initiate. 3. if aligns w/ GOC to initiate NGT feeds -- consult RD for reccs. 4. If unable to place NGT and family agreeable to alternate means of hydration -- consider NST c/s for PPN/TPN. 5. Consider SLP eval to assess for PO diet

## 2021-09-17 NOTE — DIETITIAN INITIAL EVALUATION ADULT. - OTHER INFO
pertinent medical information:   -- 89 yo female w/ PMH of Intraductal papillary mucinous neoplasm of the pancreas, CAD, HTN presents for unresponsiveness found to have acute renal failure, metabolic acidosis, and UTI s/p UDALL for HD trial now delayed until 9/17 per nephro  #UTI  #Sepsis Shock: fluid responsive-f/u UCx sensitivities and blood cx  #Metabolic encephalopathy 2/2 acute renal failure  #Hyperkalemia with metabolic acidosis--resolved  #Hypercalcemia--resolved  #OSMAR - likely ATN, oliguric -  kidney and bladder sono noted will need to switch catheter   OUR TEAM SPOKE with  Son, Musa who is insisting on trial of HD despite the risk of hypotension and arrythmia. He understands that HD is not going to change the overall prognosis of the pt. He agreed for a trial of HD and signed the consent.  - [place] placement done --will hold HD today and assess in AM/ no urgency per nephro note 9/16  #Nutrition-patient is not appropriate for PO intake, NPO for now.  - Previous NG tube placement attempts failed  #-Family wishes more time to think about CMO/Hospice; want to hold off on NGT for now  --Encouraged to focus on mom's express wishes - comfort and no tubes; and the big picture rather than lab values. Educated about NGT attempts causing distress.   We discussed that sometimes dialysis can be looked at as tubes.   --They need to take the night and we will revisit tomorrow palliative care note 9/16    pertinent subjective information: Pt is currently NPO awaiting decision on GOC. Will cont to monitor POC and make recommendations as appropriate w/ plan per family.

## 2021-09-17 NOTE — PROGRESS NOTE ADULT - ASSESSMENT
89 yo female w/ PMH of pancreatic mass, CAD, HTN presents for unresponsiveness.     Renal consulted for OSMAR with hyperkalemia, met acidosis and hypercalcemia.    #OSMAR - likely ATN, had been oliguric    was planned for HD [place] placement  cr imrpoving UOP improved  no indication for HD at this time     #Metabolic encephalopathy 2/2 acute renal failure and dehydration  #Hyperkalemia with metabolic acidosis  -resolving     #Hypercalcemia  - Ca 11.4, improved   - possibly 2/2 malignancy and dehydration  -   #UTI - UA positive  - started on  Rocephin   D/W medical team and family at length

## 2021-09-17 NOTE — CHART NOTE - NSCHARTNOTEFT_GEN_A_CORE
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      things to follow up:  trial of voiding  palliative/GOC decision from family  HD going or not going? 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      things to follow up:  trial of voiding  palliative/GOC decision from family  HD going or not going? awaiting nephro assessment  fu speech and swallow      for full plan please see my progress note

## 2021-09-17 NOTE — DIETITIAN INITIAL EVALUATION ADULT. - PERTINENT LABORATORY DATA
99/16) H/H: 10.8/32.3, BUN: 135, Cr: 4.4, Glucose: 109, eGFR: 8    CAPILLARY BLOOD GLUCOSE  POCT Blood Glucose.: 94 mg/dL (17 Sep 2021 07:38)  POCT Blood Glucose.: 98 mg/dL (16 Sep 2021 21:22)  POCT Blood Glucose.: 108 mg/dL (16 Sep 2021 11:08)

## 2021-09-17 NOTE — DIETITIAN INITIAL EVALUATION ADULT. - OTHER CALCULATIONS
using ABW 49.9kg; Energy: 1250-1500kcal (25-30kcal); Protein: 50-59g/day (1-1.2g/kg); Fluid: per CEU team

## 2021-09-17 NOTE — DIETITIAN INITIAL EVALUATION ADULT. - ORAL INTAKE PTA/DIET HISTORY
d/t pt's current medical condition and ongoing Goals of care discussion -- will hold-off on calling family for nutrition hx. Will f/u w/ POC.

## 2021-09-17 NOTE — PROGRESS NOTE ADULT - SUBJECTIVE AND OBJECTIVE BOX
QUIN HERNADEZ             MRN-[id number]      Patient is a 89y old Female who presents with a chief complaint of acute renal failure (16 Sep 2021 09:00)    Currently admitted with the primary diagnosis of: acute renal failure, encephalopathy    today, comfortable; per sons' report taking apple sauce     ROS:  UNABLE TO OBTAIN  due to: responds by moaning returns to resting state with non-pharm interventions    DYSPNEA:  N	  NAUS/VOM: N	  SECRETIONS:  N	  AGITATION: N  Pain (Y/N):    N  -Provocation/Palliation:  -Quality/Quantity:  -Radiating:  -Severity:  -Timing/Frequency:  -Impact on ADLs:      PEx:   Vital Signs Last 24 Hrs  T(C): 36 (17 Sep 2021 12:00), Max: 36.7 (16 Sep 2021 20:00)  T(F): 96.8 (17 Sep 2021 12:00), Max: 98.1 (16 Sep 2021 20:00)  HR: 67 (17 Sep 2021 12:00) (67 - 125)  BP: 101/53 (17 Sep 2021 12:00) (88/53 - 111/56)  BP(mean): 76 (17 Sep 2021 12:00) (65 - 79)  RR: 18 (17 Sep 2021 12:00) (17 - 18)  SpO2: 98% (17 Sep 2021 12:00) (97% - 99%)    General: resting in bed; NAD  Eyes:  PER   ENMT: no external oral ulcers, moist  CVS: not tachy when resting   Resp: Unlabored Non tachypneic No increased WOB  GI: ND  :  Shankar  Musc: No C/C/slE    Neuro: Alert and Oriented to person; knows NH room number; not able to tell sons names  Psych: Calm pleasant   Skin: Non jaundiced ,   Last BM: 9/17 emr    ALLERGIES: aspirin (Unknown)  penicillin (Unknown)  shellfish (Unknown)      Labs:	    CBC:                        10.8   13.92 )-----------( 192      ( 16 Sep 2021 05:45 )             32.3     CMP:    09-16    146  |  104  |  135<HH>  ----------------------------<  109<H>  4.2   |  27  |  4.4<HH>    Ca    8.4<L>      16 Sep 2021 05:45  Phos  4.1     09-15  Mg     2.3     09-16    TPro  5.4<L>  /  Alb  2.9<L>  /  TBili  0.2  /  DBili  x   /  AST  41  /  ALT  40  /  AlkPhos  98  09-16       PT/INR - ( 15 Sep 2021 11:14 )   PT: 13.30 sec;   INR: 1.16 ratio              < from: Xray Chest 1 View-PORTABLE IMMEDIATE (Xray Chest 1 View-PORTABLE IMMEDIATE .) (09.16.21 @ 10:58) >  EXAM:  XR CHEST PORTABLE IMMED 1V            PROCEDURE DATE:  09/16/2021            INTERPRETATION:  Clinical History / Reason for exam: NGT.    Comparison : Chest radiograph September 15, 2021.    Technique/Positioning: Adequate.    Findings:    Support devices: None.    Cardiac/mediastinum/hilum: Unremarkable.    Lung parenchyma/Pleura: Within normal limits.    Skeleton/soft tissues: Old right-sided rib fractures as well as degenerative changes of the axial skeleton.    Impression:    No radiographic evidence of acute cardiopulmonary disease.    Bones as above.    --- End of Report ---          ANNEMARIE SANCHEZ MD; Attending Radiologist  This document has been electronically signed. Sep 16 2021 11:17AM    < end of copied text >        Imaging Personally Reviewed:  [ x] YES  [ ] NO    Consultant(s) Notes Reviewed:  [x] YES  [ ] NO  Care Discussed with Consultants/Other Providers [x ] YES  [ ] NO    MEDICATIONS  (STANDING):  alteplase    CDT Bolus. 4 milliGRAM(s) IntraCatheter. once  cefTRIAXone   IVPB 1000 milliGRAM(s) IV Intermittent every 24 hours  dextrose 5%. 1000 milliLiter(s) (50 mL/Hr) IV Continuous <Continuous>  heparin   Injectable 5000 Unit(s) SubCutaneous every 12 hours  midodrine 10 milliGRAM(s) Oral every 8 hours  potassium chloride  20 mEq/100 mL IVPB 20 milliEquivalent(s) IV Intermittent every 2 hours    MEDICATIONS  (PRN):    ADVANCED DIRECTIVES:               DNR DNI            DECISION MAKER: Patient [  ]  Family [x  ]  Other [  ] _______  LEGAL SURROGATE:      GOALS OF CARE DISCUSSION       Palliative care info/counseling provided	                  Documentation of GOC/Advanced Care Planning:      PSYCHOSOCIAL-SPIRITUAL ASSESSMENT:         See Palliative Care SW/ documentation      CURRENT DISPO PLAN:         WILL REMAIN IN HOSPITAL          REFERRALS	        Palliative Med        Unit SW/Case Mgmt

## 2021-09-17 NOTE — PROGRESS NOTE ADULT - ASSESSMENT
89 yo female w/ PMH of Intraductal papillary mucinous neoplasm of the pancreas, CAD, HTN presents for unresponsiveness found to have acute renal failure, metabolic acidosis, and UTI s/p UDALL for HD trial now delayed until 9/17 per nephro.      #GOC  - Palliative following  - family aware of poor prognosis and likely not a great candidate for dialysis  - Family will make GOC decision today, likely leaning towards CMO.    #UTI  #Sepsis Shock: fluid responsive  -BP low in the ED, was bolused (2.5L LR) did not require pressors.  - UA positive  - s/p cefepime and vanc in ED  -9/13 Ucx- >100,000 CFU/ml Klebsiella oxytoca/Raoutella ornithinolytica sensitive to Rocephin  -9/13 Blood cx: NGTD  Plan:  - f/u palliative consult  - c/w Rocephin  -f/u UCx sensitivities and blood cx    #Metabolic encephalopathy 2/2 acute renal failure  #Hyperkalemia with metabolic acidosis--resolved  #Uremia  - Bicarb 10, K 8.1, Cr 8, pH 7.27 on admission  - 9/16 HCO3 27 K 4.2  Cr 4.4  - s/p insulin and d50 and bicarb gtt  - valencia placed in ED  - patient is anuric with 100cc/12hours  - JOEY: no hydro, no stones, debris in bladder  - 9/16 d/c'd bicarb gtt 100cc/hr  -s/p R fem UDALL  Plan:  - HD delayed by Nephro until today to reassess the need/risks/benefits.    #Hypercalcemia--resolved  #h/o Intraductal papillary mucinous neoplasm of the pancreas (MRI in [facility])  - Ca 11.4 on admission--> 9/16 8.4  - possibly 2/2 malignancy  - ordered SPEP and UPEP, ionized Ca, PTHrp, PTH and phos per nephro to be drawn during HD    #Nutrition  -patient is not appropriate for PO intake, NPO for now.  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  -f/u speech and swallow as patient is more awake today.    #DVT ppx: heparin subq  #GI ppx: not indicated  #Diet: NPO, pending speech and swallow  #Dispo: SDU  Code: DNR/DNI    89 yo female w/ PMH of Intraductal papillary mucinous neoplasm of the pancreas, CAD, HTN presents for unresponsiveness found to have acute renal failure, metabolic acidosis, and UTI s/p UDALL for HD trial now delayed until 9/17 per nephro.      #GOC  - Palliative following  - family aware of poor prognosis and likely not a great candidate for dialysis  - Family will make GOC decision today, likely leaning towards CMO.    #UTI  #Sepsis Shock: fluid responsive  -BP low in the ED, was bolused (2.5L LR) did not require pressors.  - UA positive  - s/p cefepime and vanc in ED  -9/13 Ucx- >100,000 CFU/ml Klebsiella oxytoca/Raoutella ornithinolytica sensitive to Rocephin  -9/13 Blood cx: NGTD  - 9/16 valencia flushed with immediate 250cc urine emptying  Plan:  - f/u palliative consult  - c/w Rocephin  -f/u UCx sensitivities and blood cx  - d/c valencia for trial of voiding    #Metabolic encephalopathy 2/2 acute renal failure  #Hyperkalemia with metabolic acidosis--resolved  #Uremia  - Bicarb 10, K 8.1, Cr 8, pH 7.27 on admission  - 9/16 HCO3 27 K 4.2  Cr 4.4  - s/p insulin and d50 and bicarb gtt  - valencia placed in ED  - patient is anuric with 100cc/12hours  - JOEY: no hydro, no stones, debris in bladder  - 9/16 d/c'd bicarb gtt 100cc/hr  -s/p R fem UDALL  Plan:  - HD delayed by Nephro until today to reassess the need/risks/benefits, likely a poor candidate.    #Hypercalcemia--resolved  #h/o Intraductal papillary mucinous neoplasm of the pancreas (MRI in [facility])  - Ca 11.4 on admission--> 9/16 8.4  - possibly 2/2 malignancy  - ordered SPEP and UPEP, ionized Ca, PTHrp, PTH and phos per nephro to be drawn during HD    #Nutrition  -patient is not appropriate for PO intake, NPO for now.  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  -f/u speech and swallow as patient is more awake today.    #DVT ppx: heparin subq  #GI ppx: not indicated  #Diet: NPO, pending speech and swallow  #Dispo: downgrade to floor  Code: DNR/DNI    89 yo female w/ PMH of 2x Intraductal papillary mucinous neoplasm of the pancreas (2018 MRI in [facility]), CAD, HTN presents for unresponsiveness found to have acute renal failure, metabolic acidosis, and UTI s/p UDALL for HD trial now delayed until today per nephro as patient labs improving and she is likely a poor HD candidate.    #GOC  - Palliative following  - family aware of poor prognosis and likely not a great candidate for dialysis  - Family will make GOC decision today, likely leaning towards CMO.    #UTI  #Sepsis Shock: fluid responsive  -BP low in the ED, was bolused (2.5L LR) did not require pressors.  - UA positive  - s/p cefepime and vanc in ED  -9/13 Ucx- >100,000 CFU/ml Klebsiella oxytoca/Raoutella ornithinolytica sensitive to Rocephin  -9/13 Blood cx: NGTD  - 9/16 valencia flushed with immediate 250cc urine emptying  Plan:  - f/u palliative consult  - c/w Rocephin  -f/u UCx sensitivities and blood cx  - d/c valencia for trial of voiding    #Metabolic encephalopathy 2/2 acute renal failure  #Hyperkalemia with metabolic acidosis--resolved  #Uremia  - Bicarb 10, K 8.1, Cr 8, pH 7.27 on admission  - 9/16 HCO3 27 K 4.2  Cr 4.4  - s/p insulin and d50 and bicarb gtt  - valencia placed in ED  - patient is anuric with 100cc/12hours  - JOEY: no hydro, no stones, debris in bladder  - 9/16 d/c'd bicarb gtt 100cc/hr  -s/p R fem UDALL  Plan:  - HD delayed by Nephro until today to reassess the need/risks/benefits, likely a poor candidate.    #Hypercalcemia--resolved  #h/o 2x Intraductal papillary mucinous neoplasm of the pancreas (2018 MRI in [facility])  - Ca 11.4 on admission--> 9/16 8.4  - possibly 2/2 malignancy  - ordered SPEP and UPEP, ionized Ca, PTHrp, PTH and phos per nephro to be drawn during HD    #Nutrition  -patient is not appropriate for PO intake, NPO for now.  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  -f/u speech and swallow as patient is more awake today.    #DVT ppx: heparin subq  #GI ppx: not indicated  #Diet: NPO, pending speech and swallow  #Dispo: downgrade to floor  Code: DNR/DNI

## 2021-09-17 NOTE — PROGRESS NOTE ADULT - CONVERSATION DETAILS
Primary team and Dr. Eller updated and answered questions.   Then I (ADC) addressed GOC  Sons are conflicted about what is happening with signs of improvement and grappling with understanidng the status of verious medical conditions: renal, infection, pancratic cancer.   Encouraged to look at big picture and include functionality and patient's past wishes. Eg. today she is taking in some PO, but not enough and this is consisted with / expected with advancing dementia.   They are open to having ACP videos emailed  Explained that Hospice is still an option  All questions answered.

## 2021-09-17 NOTE — CHART NOTE - NSCHARTNOTEFT_GEN_A_CORE
Patient was alert during visit.  Grandson was at bedside.  Patient responded spontaneously and appropriately to writer.

## 2021-09-17 NOTE — PROGRESS NOTE ADULT - SUBJECTIVE AND OBJECTIVE BOX
QUIN HERNADEZ 89y Female  MRN#: [id number]   Hospital Day: 4d    HPI:  89 yo female w/ PMH of pancreatic mass, CAD, HTN presents for unresponsiveness. Unable to provide further HPI due to mental status. Per nursing home NP, baseline mental status is A&Ox1, for past couple weeks a little bit more confused than normal, yesterday started becoming lethargic and minimally responsive.Patient is from [facility] and was DNR/DNI/DNH but due to her worsening condition. Sons were spoken to and initially rescinded the DNR/DNI/DNH but due to patient's clinical condition and poor prognosis, patient was made DNR/DNI.     In the ED, labs were remarkable for K 8.1, bicarb 10, , Cr 8.6 (baseline 1.0 in february), Ca 11.4, protein 8.4, VBG pH 7.27, pCO2 29. UA is positive. EKG is NSR.     Vital Signs Last 24 Hrs  T(C): --  T(F): --  HR: 88 (13 Sep 2021 22:10) (84 - 150)  BP: 106/62 (13 Sep 2021 22:10) (85/49 - 169/104)  BP(mean): 79 (13 Sep 2021 22:10) (63 - 124)  ABP: --  ABP(mean): --  RR: 18 (13 Sep 2021 22:10) (16 - 18)  SpO2: 100% (13 Sep 2021 22:10) (97% - 100%)       (13 Sep 2021 23:57)      SUBJECTIVE  Patient is a 89y old Female who presents with a chief complaint of acute renal failure (17 Sep 2021 06:46)  Currently admitted to medicine with the primary diagnosis of Sepsis secondary to UTI  Patient more awake, now answering questions more appropriately  states shes feels ok and does not want to eat  had a bowel movement overnight  has not been out of bed  Urine output of last 24 hours 1.3 liters  NPO still pending speech and swallow      INTERVAL HPI AND OVERNIGHT EVENTS:  Patient was examined and seen at bedside. This morning she is resting comfortably in bed and reports no issues or overnight events.    OBJECTIVE  PAST MEDICAL & SURGICAL HISTORY    ALLERGIES:  aspirin (Unknown)  penicillin (Unknown)  shellfish (Unknown)    MEDICATIONS:  STANDING MEDICATIONS  alteplase    CDT Bolus. 4 milliGRAM(s) IntraCatheter. once  cefTRIAXone   IVPB 1000 milliGRAM(s) IV Intermittent every 24 hours  dextrose 5%. 1000 milliLiter(s) IV Continuous <Continuous>  heparin   Injectable 5000 Unit(s) SubCutaneous every 12 hours  midodrine 10 milliGRAM(s) Oral every 8 hours    PRN MEDICATIONS      VITAL SIGNS: Last 24 Hours  T(C): 35.9 (17 Sep 2021 08:27), Max: 36.7 (16 Sep 2021 20:00)  T(F): 96.7 (17 Sep 2021 08:27), Max: 98.1 (16 Sep 2021 20:00)  HR: 77 (17 Sep 2021 08:27) (73 - 125)  BP: 111/56 (17 Sep 2021 08:27) (88/53 - 111/56)  BP(mean): 79 (17 Sep 2021 08:27) (65 - 79)  RR: 18 (17 Sep 2021 08:27) (17 - 18)  SpO2: 99% (17 Sep 2021 08:27) (97% - 99%)    LABS:                        10.8   13.92 )-----------( 192      ( 16 Sep 2021 05:45 )             32.3     09-16    146  |  104  |  135<HH>  ----------------------------<  109<H>  4.2   |  27  |  4.4<HH>    Ca    8.4<L>      16 Sep 2021 05:45  Phos  4.1     09-15  Mg     2.3     09-16    TPro  5.4<L>  /  Alb  2.9<L>  /  TBili  0.2  /  DBili  x   /  AST  41  /  ALT  40  /  AlkPhos  98  09-16    PT/INR - ( 15 Sep 2021 11:14 )   PT: 13.30 sec;   INR: 1.16 ratio           RADIOLOGY: No New Imaging      PHYSICAL EXAM:  CONSTITUTIONAL: No acute distress, well-developed, well-groomed, does not open eyes in response to name or palpation. Unresponsive to questioning  HEAD: Atraumatic, normocephalic  EYES: EOM intact, Pupils equally round but minimally responsive to light, conjunctiva and sclera clear  ENT: Supple, moist mucous membranes  PULMONARY: Clear to auscultation bilaterally; no wheezes, rales, or rhonchi  CARDIOVASCULAR: Regular rate; no murmurs, rubs, or gallops  GASTROINTESTINAL: Soft, groans on light palpation, non-distended; bowel sounds present  MUSCULOSKELETAL: 2+ peripheral pulses; no edema  NEUROLOGY: does not follow commands, ANOx0  SKIN: warm and dry. R Fem UDALL clean and intact   QUIN HERNADEZ 89y Female  MRN#: [id number]   Hospital Day: 4d    HPI:  89 yo female w/ PMH of pancreatic mass, CAD, HTN presents for unresponsiveness. Unable to provide further HPI due to mental status. Per nursing home NP, baseline mental status is A&Ox1, for past couple weeks a little bit more confused than normal, yesterday started becoming lethargic and minimally responsive.Patient is from [facility] and was DNR/DNI/DNH but due to her worsening condition. Sons were spoken to and initially rescinded the DNR/DNI/DNH but due to patient's clinical condition and poor prognosis, patient was made DNR/DNI.     In the ED, labs were remarkable for K 8.1, bicarb 10, , Cr 8.6 (baseline 1.0 in february), Ca 11.4, protein 8.4, VBG pH 7.27, pCO2 29. UA is positive. EKG is NSR.     Vital Signs Last 24 Hrs  T(C): --  T(F): --  HR: 88 (13 Sep 2021 22:10) (84 - 150)  BP: 106/62 (13 Sep 2021 22:10) (85/49 - 169/104)  BP(mean): 79 (13 Sep 2021 22:10) (63 - 124)  ABP: --  ABP(mean): --  RR: 18 (13 Sep 2021 22:10) (16 - 18)  SpO2: 100% (13 Sep 2021 22:10) (97% - 100%)       (13 Sep 2021 23:57)      SUBJECTIVE  Patient is a 89y old Female who presents with a chief complaint of acute renal failure (17 Sep 2021 06:46)  Currently admitted to medicine with the primary diagnosis of Sepsis secondary to UTI  Patient more awake, now answering questions more appropriately  states shes feels ok and does not want to eat  had a bowel movement overnight  has not been out of bed  Urine output of last 24 hours 1.3 liters  NPO still pending speech and swallow  still not really responding to ROS questions.      INTERVAL HPI AND OVERNIGHT EVENTS:  Patient was examined and seen at bedside. This morning she is resting comfortably in bed and reports no issues or overnight events.    OBJECTIVE  PAST MEDICAL & SURGICAL HISTORY    ALLERGIES:  aspirin (Unknown)  penicillin (Unknown)  shellfish (Unknown)    MEDICATIONS:  STANDING MEDICATIONS  alteplase    CDT Bolus. 4 milliGRAM(s) IntraCatheter. once  cefTRIAXone   IVPB 1000 milliGRAM(s) IV Intermittent every 24 hours  dextrose 5%. 1000 milliLiter(s) IV Continuous <Continuous>  heparin   Injectable 5000 Unit(s) SubCutaneous every 12 hours  midodrine 10 milliGRAM(s) Oral every 8 hours    PRN MEDICATIONS      VITAL SIGNS: Last 24 Hours  T(C): 35.9 (17 Sep 2021 08:27), Max: 36.7 (16 Sep 2021 20:00)  T(F): 96.7 (17 Sep 2021 08:27), Max: 98.1 (16 Sep 2021 20:00)  HR: 77 (17 Sep 2021 08:27) (73 - 125)  BP: 111/56 (17 Sep 2021 08:27) (88/53 - 111/56)  BP(mean): 79 (17 Sep 2021 08:27) (65 - 79)  RR: 18 (17 Sep 2021 08:27) (17 - 18)  SpO2: 99% (17 Sep 2021 08:27) (97% - 99%)    LABS:                        10.8   13.92 )-----------( 192      ( 16 Sep 2021 05:45 )             32.3     09-16    146  |  104  |  135<HH>  ----------------------------<  109<H>  4.2   |  27  |  4.4<HH>    Ca    8.4<L>      16 Sep 2021 05:45  Phos  4.1     09-15  Mg     2.3     09-16    TPro  5.4<L>  /  Alb  2.9<L>  /  TBili  0.2  /  DBili  x   /  AST  41  /  ALT  40  /  AlkPhos  98  09-16    PT/INR - ( 15 Sep 2021 11:14 )   PT: 13.30 sec;   INR: 1.16 ratio           RADIOLOGY: No New Imaging      PHYSICAL EXAM:  CONSTITUTIONAL: No acute distress, well-developed, well-groomed, does not open eyes in response to name or palpation. Unresponsive to questioning  HEAD: Atraumatic, normocephalic  EYES: EOM intact, Pupils equally round but minimally responsive to light, conjunctiva and sclera clear  ENT: Supple, moist mucous membranes  PULMONARY: Clear to auscultation bilaterally; no wheezes, rales, or rhonchi  CARDIOVASCULAR: Regular rate; no murmurs, rubs, or gallops  GASTROINTESTINAL: Soft, groans on light palpation, non-distended; bowel sounds present  MUSCULOSKELETAL: 2+ peripheral pulses; no edema  NEUROLOGY: does not follow commands, ANOx0  SKIN: warm and dry. R Fem UDALL clean and intact   QUIN HERNADEZ 89y Female  MRN#: [id number]   Hospital Day: 4d    HPI:  89 yo female w/ PMH of pancreatic mass, CAD, HTN presents for unresponsiveness. Unable to provide further HPI due to mental status. Per nursing home NP, baseline mental status is A&Ox1, for past couple weeks a little bit more confused than normal, yesterday started becoming lethargic and minimally responsive.Patient is from [facility] and was DNR/DNI/DNH but due to her worsening condition. Sons were spoken to and initially rescinded the DNR/DNI/DNH but due to patient's clinical condition and poor prognosis, patient was made DNR/DNI.     In the ED, labs were remarkable for K 8.1, bicarb 10, , Cr 8.6 (baseline 1.0 in february), Ca 11.4, protein 8.4, VBG pH 7.27, pCO2 29. UA is positive. EKG is NSR.     Vital Signs Last 24 Hrs  T(C): --  T(F): --  HR: 88 (13 Sep 2021 22:10) (84 - 150)  BP: 106/62 (13 Sep 2021 22:10) (85/49 - 169/104)  BP(mean): 79 (13 Sep 2021 22:10) (63 - 124)  ABP: --  ABP(mean): --  RR: 18 (13 Sep 2021 22:10) (16 - 18)  SpO2: 100% (13 Sep 2021 22:10) (97% - 100%)       (13 Sep 2021 23:57)      SUBJECTIVE  Patient is a 89y old Female who presents with a chief complaint of acute renal failure (17 Sep 2021 06:46)  Currently admitted to medicine with the primary diagnosis of Sepsis secondary to UTI  Patient more awake, now answering questions more appropriately  states shes feels ok and does not want to eat  had a bowel movement overnight  has not been out of bed  Urine output of last 24 hours 1.3 liters  NPO still pending speech and swallow  still not really responding to ROS questions.      INTERVAL HPI AND OVERNIGHT EVENTS:  Patient was examined and seen at bedside. This morning she is resting comfortably in bed and reports no issues or overnight events.    OBJECTIVE  PAST MEDICAL & SURGICAL HISTORY    ALLERGIES:  aspirin (Unknown)  penicillin (Unknown)  shellfish (Unknown)    MEDICATIONS:  STANDING MEDICATIONS  alteplase    CDT Bolus. 4 milliGRAM(s) IntraCatheter. once  cefTRIAXone   IVPB 1000 milliGRAM(s) IV Intermittent every 24 hours  dextrose 5%. 1000 milliLiter(s) IV Continuous <Continuous>  heparin   Injectable 5000 Unit(s) SubCutaneous every 12 hours  midodrine 10 milliGRAM(s) Oral every 8 hours    PRN MEDICATIONS      VITAL SIGNS: Last 24 Hours  T(C): 35.9 (17 Sep 2021 08:27), Max: 36.7 (16 Sep 2021 20:00)  T(F): 96.7 (17 Sep 2021 08:27), Max: 98.1 (16 Sep 2021 20:00)  HR: 77 (17 Sep 2021 08:27) (73 - 125)  BP: 111/56 (17 Sep 2021 08:27) (88/53 - 111/56)  BP(mean): 79 (17 Sep 2021 08:27) (65 - 79)  RR: 18 (17 Sep 2021 08:27) (17 - 18)  SpO2: 99% (17 Sep 2021 08:27) (97% - 99%)    LABS:                        10.8   13.92 )-----------( 192      ( 16 Sep 2021 05:45 )             32.3     09-16    146  |  104  |  135<HH>  ----------------------------<  109<H>  4.2   |  27  |  4.4<HH>    Ca    8.4<L>      16 Sep 2021 05:45  Phos  4.1     09-15  Mg     2.3     09-16    TPro  5.4<L>  /  Alb  2.9<L>  /  TBili  0.2  /  DBili  x   /  AST  41  /  ALT  40  /  AlkPhos  98  09-16    PT/INR - ( 15 Sep 2021 11:14 )   PT: 13.30 sec;   INR: 1.16 ratio           RADIOLOGY: No New Imaging      PHYSICAL EXAM:  CONSTITUTIONAL: No acute distress, well-developed, well-groomed, awake, alert, opens eyes to name. Responds to some questions with short answers.  HEAD: Atraumatic, normocephalic  EYES: EOM intact, Pupils equally round but minimally responsive to light, conjunctiva and sclera clear  ENT: Supple, moist mucous membranes  PULMONARY: Clear to auscultation bilaterally; no wheezes, rales, or rhonchi  CARDIOVASCULAR: Regular rate; no murmurs, rubs, or gallops  GASTROINTESTINAL: Soft, groans on light palpation, non-distended; bowel sounds present  MUSCULOSKELETAL: 2+ peripheral pulses; no edema  NEUROLOGY: follow commands, ANOx1  SKIN: warm and dry. R Fem UDALL clean and intact

## 2021-09-17 NOTE — PHYSICAL THERAPY INITIAL EVALUATION ADULT - GENERAL OBSERVATIONS, REHAB EVAL
Chart reviewed. pt has a R femoral Dale as per vascular notes. Hold PT eval at this time. To f/u as appropriate.

## 2021-09-18 LAB
ALBUMIN SERPL ELPH-MCNC: 2.8 G/DL — LOW (ref 3.5–5.2)
ALP SERPL-CCNC: 107 U/L — SIGNIFICANT CHANGE UP (ref 30–115)
ALT FLD-CCNC: 32 U/L — SIGNIFICANT CHANGE UP (ref 0–41)
ANION GAP SERPL CALC-SCNC: 11 MMOL/L — SIGNIFICANT CHANGE UP (ref 7–14)
ANION GAP SERPL CALC-SCNC: 12 MMOL/L — SIGNIFICANT CHANGE UP (ref 7–14)
AST SERPL-CCNC: 31 U/L — SIGNIFICANT CHANGE UP (ref 0–41)
BASOPHILS # BLD AUTO: 0.02 K/UL — SIGNIFICANT CHANGE UP (ref 0–0.2)
BASOPHILS NFR BLD AUTO: 0.2 % — SIGNIFICANT CHANGE UP (ref 0–1)
BILIRUB SERPL-MCNC: 0.3 MG/DL — SIGNIFICANT CHANGE UP (ref 0.2–1.2)
BUN SERPL-MCNC: 88 MG/DL — CRITICAL HIGH (ref 10–20)
BUN SERPL-MCNC: 96 MG/DL — CRITICAL HIGH (ref 10–20)
CALCIUM SERPL-MCNC: 8.5 MG/DL — SIGNIFICANT CHANGE UP (ref 8.5–10.1)
CALCIUM SERPL-MCNC: 8.5 MG/DL — SIGNIFICANT CHANGE UP (ref 8.5–10.1)
CHLORIDE SERPL-SCNC: 107 MMOL/L — SIGNIFICANT CHANGE UP (ref 98–110)
CHLORIDE SERPL-SCNC: 109 MMOL/L — SIGNIFICANT CHANGE UP (ref 98–110)
CO2 SERPL-SCNC: 27 MMOL/L — SIGNIFICANT CHANGE UP (ref 17–32)
CO2 SERPL-SCNC: 29 MMOL/L — SIGNIFICANT CHANGE UP (ref 17–32)
CREAT SERPL-MCNC: 2.3 MG/DL — HIGH (ref 0.7–1.5)
CREAT SERPL-MCNC: 2.7 MG/DL — HIGH (ref 0.7–1.5)
EOSINOPHIL # BLD AUTO: 0.79 K/UL — HIGH (ref 0–0.7)
EOSINOPHIL NFR BLD AUTO: 6.5 % — SIGNIFICANT CHANGE UP (ref 0–8)
GLUCOSE SERPL-MCNC: 84 MG/DL — SIGNIFICANT CHANGE UP (ref 70–99)
GLUCOSE SERPL-MCNC: 85 MG/DL — SIGNIFICANT CHANGE UP (ref 70–99)
HCT VFR BLD CALC: 28.8 % — LOW (ref 37–47)
HCT VFR BLD CALC: 32.3 % — LOW (ref 37–47)
HCT VFR BLD CALC: 7.4 % — LOW (ref 37–47)
HGB BLD-MCNC: 1.9 G/DL — CRITICAL LOW (ref 12–16)
HGB BLD-MCNC: 10.2 G/DL — LOW (ref 12–16)
HGB BLD-MCNC: 8.9 G/DL — LOW (ref 12–16)
IMM GRANULOCYTES NFR BLD AUTO: 2.8 % — HIGH (ref 0.1–0.3)
LACTATE SERPL-SCNC: 1.4 MMOL/L — SIGNIFICANT CHANGE UP (ref 0.7–2)
LYMPHOCYTES # BLD AUTO: 1.45 K/UL — SIGNIFICANT CHANGE UP (ref 1.2–3.4)
LYMPHOCYTES # BLD AUTO: 12 % — LOW (ref 20.5–51.1)
MAGNESIUM SERPL-MCNC: 2.1 MG/DL — SIGNIFICANT CHANGE UP (ref 1.8–2.4)
MCHC RBC-ENTMCNC: 25.9 G/DL — LOW (ref 32–37)
MCHC RBC-ENTMCNC: 30.6 PG — SIGNIFICANT CHANGE UP (ref 27–31)
MCHC RBC-ENTMCNC: 30.8 PG — SIGNIFICANT CHANGE UP (ref 27–31)
MCHC RBC-ENTMCNC: 30.9 G/DL — LOW (ref 32–37)
MCHC RBC-ENTMCNC: 31.5 PG — HIGH (ref 27–31)
MCHC RBC-ENTMCNC: 31.6 G/DL — LOW (ref 32–37)
MCV RBC AUTO: 121.6 FL — HIGH (ref 81–99)
MCV RBC AUTO: 97.6 FL — SIGNIFICANT CHANGE UP (ref 81–99)
MCV RBC AUTO: 99 FL — SIGNIFICANT CHANGE UP (ref 81–99)
MONOCYTES # BLD AUTO: 0.84 K/UL — HIGH (ref 0.1–0.6)
MONOCYTES NFR BLD AUTO: 7 % — SIGNIFICANT CHANGE UP (ref 1.7–9.3)
NEUTROPHILS # BLD AUTO: 8.63 K/UL — HIGH (ref 1.4–6.5)
NEUTROPHILS NFR BLD AUTO: 71.5 % — SIGNIFICANT CHANGE UP (ref 42.2–75.2)
NRBC # BLD: 0 /100 WBCS — SIGNIFICANT CHANGE UP (ref 0–0)
NRBC # BLD: 0 /100 WBCS — SIGNIFICANT CHANGE UP (ref 0–0)
PLATELET # BLD AUTO: 150 K/UL — SIGNIFICANT CHANGE UP (ref 130–400)
PLATELET # BLD AUTO: 17 K/UL — CRITICAL LOW (ref 130–400)
PLATELET # BLD AUTO: 180 K/UL — SIGNIFICANT CHANGE UP (ref 130–400)
POTASSIUM SERPL-MCNC: 3.7 MMOL/L — SIGNIFICANT CHANGE UP (ref 3.5–5)
POTASSIUM SERPL-MCNC: 4.5 MMOL/L — SIGNIFICANT CHANGE UP (ref 3.5–5)
POTASSIUM SERPL-SCNC: 3.7 MMOL/L — SIGNIFICANT CHANGE UP (ref 3.5–5)
POTASSIUM SERPL-SCNC: 4.5 MMOL/L — SIGNIFICANT CHANGE UP (ref 3.5–5)
PROT SERPL-MCNC: 5 G/DL — LOW (ref 6–8)
PROT SERPL-MCNC: 5 G/DL — LOW (ref 6–8.3)
PROT SERPL-MCNC: 5 G/DL — LOW (ref 6–8.3)
RBC # BLD: 0.61 M/UL — LOW (ref 4.2–5.4)
RBC # BLD: 2.91 M/UL — LOW (ref 4.2–5.4)
RBC # BLD: 3.31 M/UL — LOW (ref 4.2–5.4)
RBC # FLD: 13.8 % — SIGNIFICANT CHANGE UP (ref 11.5–14.5)
RBC # FLD: 14 % — SIGNIFICANT CHANGE UP (ref 11.5–14.5)
RBC # FLD: SIGNIFICANT CHANGE UP % (ref 11.5–14.5)
SODIUM SERPL-SCNC: 146 MMOL/L — SIGNIFICANT CHANGE UP (ref 135–146)
SODIUM SERPL-SCNC: 149 MMOL/L — HIGH (ref 135–146)
WBC # BLD: 12.07 K/UL — HIGH (ref 4.8–10.8)
WBC # BLD: 15.75 K/UL — HIGH (ref 4.8–10.8)
WBC # BLD: 2.75 K/UL — LOW (ref 4.8–10.8)
WBC # FLD AUTO: 12.07 K/UL — HIGH (ref 4.8–10.8)
WBC # FLD AUTO: 15.75 K/UL — HIGH (ref 4.8–10.8)
WBC # FLD AUTO: 2.75 K/UL — LOW (ref 4.8–10.8)

## 2021-09-18 PROCEDURE — 99233 SBSQ HOSP IP/OBS HIGH 50: CPT

## 2021-09-18 RX ORDER — SODIUM CHLORIDE 9 MG/ML
1000 INJECTION, SOLUTION INTRAVENOUS
Refills: 0 | Status: DISCONTINUED | OUTPATIENT
Start: 2021-09-18 | End: 2021-09-19

## 2021-09-18 RX ADMIN — MIDODRINE HYDROCHLORIDE 10 MILLIGRAM(S): 2.5 TABLET ORAL at 21:32

## 2021-09-18 RX ADMIN — MIDODRINE HYDROCHLORIDE 10 MILLIGRAM(S): 2.5 TABLET ORAL at 05:55

## 2021-09-18 RX ADMIN — HEPARIN SODIUM 5000 UNIT(S): 5000 INJECTION INTRAVENOUS; SUBCUTANEOUS at 05:55

## 2021-09-18 RX ADMIN — SODIUM CHLORIDE 50 MILLILITER(S): 9 INJECTION, SOLUTION INTRAVENOUS at 09:36

## 2021-09-18 RX ADMIN — CEFTRIAXONE 100 MILLIGRAM(S): 500 INJECTION, POWDER, FOR SOLUTION INTRAMUSCULAR; INTRAVENOUS at 05:55

## 2021-09-18 RX ADMIN — HEPARIN SODIUM 5000 UNIT(S): 5000 INJECTION INTRAVENOUS; SUBCUTANEOUS at 17:18

## 2021-09-18 RX ADMIN — MIDODRINE HYDROCHLORIDE 10 MILLIGRAM(S): 2.5 TABLET ORAL at 13:03

## 2021-09-18 NOTE — PROGRESS NOTE ADULT - ASSESSMENT
89 yo female w/ PMH of pancreatic mass, CAD, HTN presents for unresponsiveness.     Renal consulted for OSMAR with hyperkalemia, met acidosis and hypercalcemia.    #OSMAR - likely ATN, non oliguric   no need for RRT  please DC [place]     # hypernatremia please increase free water to 400 cc q4h     #Metabolic encephalopathy 2/2 acute renal failure and dehydration better   #Hyperkalemia with metabolic acidosis resolving   -resolving     #Hypercalcemia  - Ca 11.4, improved   - possibly 2/2 malignancy and dehydration 8.5 now   -   #UTI - UA positive  - started on  Rocephin   D/W medical team

## 2021-09-18 NOTE — PROGRESS NOTE ADULT - SUBJECTIVE AND OBJECTIVE BOX
Nephrology progress note    THIS IS AN INCOMPLETE NOTE . FULL NOTE TO FOLLOW SHORTLY    Patient is seen and examined, events over the last 24 h noted .    Allergies:  aspirin (Unknown)  penicillin (Unknown)  shellfish (Unknown)    Hospital Medications:   MEDICATIONS  (STANDING):  alteplase    CDT Bolus. 4 milliGRAM(s) IntraCatheter. once  heparin   Injectable 5000 Unit(s) SubCutaneous every 12 hours  lactated ringers. 1000 milliLiter(s) (50 mL/Hr) IV Continuous <Continuous>  midodrine 10 milliGRAM(s) Oral every 8 hours        VITALS:  T(F): 97 (21 @ 07:34), Max: 97.8 (21 @ 00:00)  HR: 67 (21 @ 07:34)  BP: 130/55 (21 @ 07:34)  RR: 18 (21 @ 07:34)  SpO2: 100% (21 @ 07:34)  Wt(kg): --     @ 07:01  -   @ 07:00  --------------------------------------------------------  IN: 900 mL / OUT: 1305 mL / NET: -405 mL     @ 07:01  -   @ 07:00  --------------------------------------------------------  IN: 550 mL / OUT: 1050 mL / NET: -500 mL          PHYSICAL EXAM:  Constitutional: NAD  HEENT: anicteric sclera, oropharynx clear, MMM  Neck: No JVD  Respiratory: CTAB, no wheezes, rales or rhonchi  Cardiovascular: S1, S2, RRR  Gastrointestinal: BS+, soft, NT/ND  Extremities: No cyanosis or clubbing. No peripheral edema  :  No valencia.   Skin: No rashes    LABS:      146  |  107  |  96<HH>  ----------------------------<  84  4.5   |  27  |  2.7<H>    Ca    8.5      17 Sep 2021 21:44  Mg     2.2         TPro  5.2<L>  /  Alb  3.0<L>  /  TBili  0.2  /  DBili      /  AST  32  /  ALT  34  /  AlkPhos  107                            10.2   11.58 )-----------( 161      ( 17 Sep 2021 10:00 )             32.9       Urine Studies:  Urinalysis Basic - ( 13 Sep 2021 19:20 )    Color: Yellow / Appearance: Turbid / S.017 / pH:   Gluc:  / Ketone: Negative  / Bili: Negative / Urobili: <2 mg/dL   Blood:  / Protein: 300 mg/dL / Nitrite: Negative   Leuk Esterase: Large / RBC: 2 /HPF / WBC >720 /HPF   Sq Epi:  / Non Sq Epi: 1 /HPF / Bacteria: Moderate        RADIOLOGY & ADDITIONAL STUDIES:   Nephrology progress note  Patient is seen and examined, events over the last 24 h noted .  Awake talkative     Allergies:  aspirin (Unknown)  penicillin (Unknown)  shellfish (Unknown)    Hospital Medications:   MEDICATIONS  (STANDING):  alteplase    CDT Bolus. 4 milliGRAM(s) IntraCatheter. once  heparin   Injectable 5000 Unit(s) SubCutaneous every 12 hours  lactated ringers. 1000 milliLiter(s) (50 mL/Hr) IV Continuous <Continuous>  midodrine 10 milliGRAM(s) Oral every 8 hours        VITALS:  T(F): 97 (21 @ 07:34), Max: 97.8 (21 @ 00:00)  HR: 67 (21 @ 07:34)  BP: 130/55 (21 @ 07:34)  RR: 18 (21 @ 07:34)  SpO2: 100% (21 @ 07:34)  Wt(kg): --     @ 07:01  -   @ 07:00  --------------------------------------------------------  IN: 900 mL / OUT: 1305 mL / NET: -405 mL     @ 07:01  -   @ 07:00  --------------------------------------------------------  IN: 550 mL / OUT: 1050 mL / NET: -500 mL          PHYSICAL EXAM:  Constitutional: NAD  Neck: No JVD  Respiratory: CTAB,   Cardiovascular: S1, S2, RRR  Gastrointestinal: BS+, soft, NT/ND  Extremities: No cyanosis or clubbing. No peripheral edema  :  No valencia.   Skin: No rashes    LABS:      149<H>  |  109  |  88<HH>  ----------------------------<  85  3.7   |  29  |  2.3<H>    Ca    8.5      18 Sep 2021 09:16  Mg     2.1         TPro  5.0<L>  /  Alb  2.8<L>  /  TBili  0.3  /  DBili  x   /  AST  31  /  ALT  32  /  AlkPhos  107      146  |  107  |  96<HH>  ----------------------------<  84  4.5   |  27  |  2.7<H>    Ca    8.5      17 Sep 2021 21:44  Mg     2.2         TPro  5.2<L>  /  Alb  3.0<L>  /  TBili  0.2  /  DBili      /  AST  32  /  ALT  34  /  AlkPhos  107                            10.2   11.58 )-----------( 161      ( 17 Sep 2021 10:00 )             32.9       Urine Studies:  Urinalysis Basic - ( 13 Sep 2021 19:20 )    Color: Yellow / Appearance: Turbid / S.017 / pH:   Gluc:  / Ketone: Negative  / Bili: Negative / Urobili: <2 mg/dL   Blood:  / Protein: 300 mg/dL / Nitrite: Negative   Leuk Esterase: Large / RBC: 2 /HPF / WBC >720 /HPF   Sq Epi:  / Non Sq Epi: 1 /HPF / Bacteria: Moderate        RADIOLOGY & ADDITIONAL STUDIES:

## 2021-09-18 NOTE — PROGRESS NOTE ADULT - ATTENDING COMMENTS
Pt feels better, but still has minimal oral intake.   F/u with family on GOC.   Start IVF, cont full assistance with feeds.

## 2021-09-18 NOTE — CHART NOTE - NSCHARTNOTEFT_GEN_A_CORE
Notified by RN that Pt had large loose bowel movement and pt's UDALL became soiled with stool. Notified RN to clean the UDALL with normal saline thoroughly. Contacted Vascular who advised the same.

## 2021-09-18 NOTE — PROGRESS NOTE ADULT - ASSESSMENT
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    #GOC  - Palliative following   - family aware of poor prognosis and likely not a great candidate for dialysis  - Pending ultimate decision on Goals of care with family, per palliative currently full medical management with DNR/DNI    #Sepsis secondary to UTI  -hypotensive in the ED, was bolused (2.5L LR) did not require pressors. BP now stable   - UA positive  -9/13 Ucx- >100,000 CFU/ml Klebsiella oxytoca/Raoutella ornithinolytica sensitive to Rocephin  -9/13 Blood cx: NGTD  Plan:  - Rocephin for 5 days - completed  - continue with valencia for now given OSMAR     #Metabolic encephalopathy 2/2 acute renal failure  #Hyperkalemia with metabolic acidosis--resolved  #Uremia  - Bicarb 10, K 8.1, Cr 8, pH 7.27 on admission  - 9/18: HCO3 27 K 4.5 BUN 96 Cr 2.7  - s/p insulin and d50 and bicarb gtt  - strict Is/Os   - JOEY: no hydro, no stones, debris in bladder  -s/p R fem UDALL  - per nephro, no indication for RRT at this time     #Hypercalcemia--resolved  #h/o 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  - must discuss with family/palliative on nutrition going forward given poor PO intake and anatomical difficulty to place NGT    #DVT ppx: heparin subq  #GI ppx: not indicated  #Diet: NPO, pending speech and swallow  #Dispo: downgrade to floor  Code: DNR/DNI

## 2021-09-19 LAB
ALBUMIN SERPL ELPH-MCNC: 3.2 G/DL — LOW (ref 3.5–5.2)
ALP SERPL-CCNC: 112 U/L — SIGNIFICANT CHANGE UP (ref 30–115)
ALT FLD-CCNC: 30 U/L — SIGNIFICANT CHANGE UP (ref 0–41)
ANION GAP SERPL CALC-SCNC: 14 MMOL/L — SIGNIFICANT CHANGE UP (ref 7–14)
AST SERPL-CCNC: 26 U/L — SIGNIFICANT CHANGE UP (ref 0–41)
BASOPHILS # BLD AUTO: 0.05 K/UL — SIGNIFICANT CHANGE UP (ref 0–0.2)
BASOPHILS # BLD AUTO: 0.05 K/UL — SIGNIFICANT CHANGE UP (ref 0–0.2)
BASOPHILS NFR BLD AUTO: 0.3 % — SIGNIFICANT CHANGE UP (ref 0–1)
BASOPHILS NFR BLD AUTO: 0.3 % — SIGNIFICANT CHANGE UP (ref 0–1)
BILIRUB SERPL-MCNC: 0.3 MG/DL — SIGNIFICANT CHANGE UP (ref 0.2–1.2)
BLD GP AB SCN SERPL QL: SIGNIFICANT CHANGE UP
BLD GP AB SCN SERPL QL: SIGNIFICANT CHANGE UP
BUN SERPL-MCNC: 76 MG/DL — CRITICAL HIGH (ref 10–20)
C DIFF BY PCR RESULT: NEGATIVE — SIGNIFICANT CHANGE UP
C DIFF TOX GENS STL QL NAA+PROBE: SIGNIFICANT CHANGE UP
CALCIUM SERPL-MCNC: 9.1 MG/DL — SIGNIFICANT CHANGE UP (ref 8.5–10.1)
CHLORIDE SERPL-SCNC: 118 MMOL/L — HIGH (ref 98–110)
CO2 SERPL-SCNC: 27 MMOL/L — SIGNIFICANT CHANGE UP (ref 17–32)
CREAT SERPL-MCNC: 1.8 MG/DL — HIGH (ref 0.7–1.5)
CULTURE RESULTS: SIGNIFICANT CHANGE UP
EOSINOPHIL # BLD AUTO: 0.36 K/UL — SIGNIFICANT CHANGE UP (ref 0–0.7)
EOSINOPHIL # BLD AUTO: 0.39 K/UL — SIGNIFICANT CHANGE UP (ref 0–0.7)
EOSINOPHIL NFR BLD AUTO: 2.4 % — SIGNIFICANT CHANGE UP (ref 0–8)
EOSINOPHIL NFR BLD AUTO: 2.7 % — SIGNIFICANT CHANGE UP (ref 0–8)
GLUCOSE SERPL-MCNC: 96 MG/DL — SIGNIFICANT CHANGE UP (ref 70–99)
HCT VFR BLD CALC: 29.6 % — LOW (ref 37–47)
HCT VFR BLD CALC: 29.7 % — LOW (ref 37–47)
HGB BLD-MCNC: 9 G/DL — LOW (ref 12–16)
HGB BLD-MCNC: 9.1 G/DL — LOW (ref 12–16)
IMM GRANULOCYTES NFR BLD AUTO: 3.8 % — HIGH (ref 0.1–0.3)
IMM GRANULOCYTES NFR BLD AUTO: 4 % — HIGH (ref 0.1–0.3)
LACTATE SERPL-SCNC: 1.5 MMOL/L — SIGNIFICANT CHANGE UP (ref 0.7–2)
LYMPHOCYTES # BLD AUTO: 16.5 % — LOW (ref 20.5–51.1)
LYMPHOCYTES # BLD AUTO: 17.8 % — LOW (ref 20.5–51.1)
LYMPHOCYTES # BLD AUTO: 2.36 K/UL — SIGNIFICANT CHANGE UP (ref 1.2–3.4)
LYMPHOCYTES # BLD AUTO: 2.68 K/UL — SIGNIFICANT CHANGE UP (ref 1.2–3.4)
MAGNESIUM SERPL-MCNC: 2.1 MG/DL — SIGNIFICANT CHANGE UP (ref 1.8–2.4)
MCHC RBC-ENTMCNC: 30.4 G/DL — LOW (ref 32–37)
MCHC RBC-ENTMCNC: 30.6 G/DL — LOW (ref 32–37)
MCHC RBC-ENTMCNC: 30.6 PG — SIGNIFICANT CHANGE UP (ref 27–31)
MCHC RBC-ENTMCNC: 30.7 PG — SIGNIFICANT CHANGE UP (ref 27–31)
MCV RBC AUTO: 100.3 FL — HIGH (ref 81–99)
MCV RBC AUTO: 100.7 FL — HIGH (ref 81–99)
MONOCYTES # BLD AUTO: 0.84 K/UL — HIGH (ref 0.1–0.6)
MONOCYTES # BLD AUTO: 0.86 K/UL — HIGH (ref 0.1–0.6)
MONOCYTES NFR BLD AUTO: 5.6 % — SIGNIFICANT CHANGE UP (ref 1.7–9.3)
MONOCYTES NFR BLD AUTO: 6 % — SIGNIFICANT CHANGE UP (ref 1.7–9.3)
NEUTROPHILS # BLD AUTO: 10.13 K/UL — HIGH (ref 1.4–6.5)
NEUTROPHILS # BLD AUTO: 10.49 K/UL — HIGH (ref 1.4–6.5)
NEUTROPHILS NFR BLD AUTO: 69.9 % — SIGNIFICANT CHANGE UP (ref 42.2–75.2)
NEUTROPHILS NFR BLD AUTO: 70.7 % — SIGNIFICANT CHANGE UP (ref 42.2–75.2)
NRBC # BLD: 0 /100 WBCS — SIGNIFICANT CHANGE UP (ref 0–0)
NRBC # BLD: 0 /100 WBCS — SIGNIFICANT CHANGE UP (ref 0–0)
OB PNL STL: POSITIVE
PLATELET # BLD AUTO: 192 K/UL — SIGNIFICANT CHANGE UP (ref 130–400)
PLATELET # BLD AUTO: 195 K/UL — SIGNIFICANT CHANGE UP (ref 130–400)
POTASSIUM SERPL-MCNC: 4 MMOL/L — SIGNIFICANT CHANGE UP (ref 3.5–5)
POTASSIUM SERPL-SCNC: 4 MMOL/L — SIGNIFICANT CHANGE UP (ref 3.5–5)
PROT SERPL-MCNC: 5.7 G/DL — LOW (ref 6–8)
RBC # BLD: 2.94 M/UL — LOW (ref 4.2–5.4)
RBC # BLD: 2.96 M/UL — LOW (ref 4.2–5.4)
RBC # FLD: 14 % — SIGNIFICANT CHANGE UP (ref 11.5–14.5)
RBC # FLD: 14.1 % — SIGNIFICANT CHANGE UP (ref 11.5–14.5)
SODIUM SERPL-SCNC: 159 MMOL/L — HIGH (ref 135–146)
SPECIMEN SOURCE: SIGNIFICANT CHANGE UP
WBC # BLD: 14.33 K/UL — HIGH (ref 4.8–10.8)
WBC # BLD: 15.02 K/UL — HIGH (ref 4.8–10.8)
WBC # FLD AUTO: 14.33 K/UL — HIGH (ref 4.8–10.8)
WBC # FLD AUTO: 15.02 K/UL — HIGH (ref 4.8–10.8)

## 2021-09-19 PROCEDURE — 99497 ADVNCD CARE PLAN 30 MIN: CPT | Mod: 25

## 2021-09-19 PROCEDURE — 99233 SBSQ HOSP IP/OBS HIGH 50: CPT

## 2021-09-19 RX ORDER — SODIUM CHLORIDE 9 MG/ML
1000 INJECTION, SOLUTION INTRAVENOUS
Refills: 0 | Status: DISCONTINUED | OUTPATIENT
Start: 2021-09-19 | End: 2021-09-20

## 2021-09-19 RX ADMIN — MIDODRINE HYDROCHLORIDE 10 MILLIGRAM(S): 2.5 TABLET ORAL at 05:05

## 2021-09-19 RX ADMIN — SODIUM CHLORIDE 50 MILLILITER(S): 9 INJECTION, SOLUTION INTRAVENOUS at 05:05

## 2021-09-19 RX ADMIN — SODIUM CHLORIDE 75 MILLILITER(S): 9 INJECTION, SOLUTION INTRAVENOUS at 21:02

## 2021-09-19 RX ADMIN — MIDODRINE HYDROCHLORIDE 10 MILLIGRAM(S): 2.5 TABLET ORAL at 21:02

## 2021-09-19 RX ADMIN — SODIUM CHLORIDE 50 MILLILITER(S): 9 INJECTION, SOLUTION INTRAVENOUS at 17:41

## 2021-09-19 RX ADMIN — HEPARIN SODIUM 5000 UNIT(S): 5000 INJECTION INTRAVENOUS; SUBCUTANEOUS at 05:05

## 2021-09-19 RX ADMIN — SODIUM CHLORIDE 100 MILLILITER(S): 9 INJECTION, SOLUTION INTRAVENOUS at 20:52

## 2021-09-19 NOTE — PROGRESS NOTE ADULT - ASSESSMENT
89 yo female w/ PMH of pancreatic mass, CAD, HTN presents for unresponsiveness.     Renal consulted for OSMAR with hyperkalemia, met acidosis and hypercalcemia.    #OSMAR - likely ATN, non oliguric   no need for RRT  please DC [place]     # hypernatremia please increase free water to 400 cc q4h     #Metabolic encephalopathy 2/2 acute renal failure and dehydration better   #Hyperkalemia with metabolic acidosis resolving   -resolving     #Hypercalcemia  - Ca 11.4, improved   - possibly 2/2 malignancy and dehydration 8.5 now   -   #UTI - UA positive  - started on  Rocephin   D/W medical team      89 yo female w/ PMH of pancreatic mass, CAD, HTN presents for unresponsiveness.     Renal consulted for OSMAR with hyperkalemia, met acidosis and hypercalcemia.    #OSMAR - likely ATN, non oliguric   no need for RRT  please mallory Aranda imrpoving no need for RRT    # hypernatremia please increase free water to 400 cc q4h     #Metabolic encephalopathy 2/2 acute renal failure and dehydration better   #Hyperkalemia with metabolic acidosis resolving   -resolving     #Hypercalcemia  - Ca 11.4, improved   - possibly 2/2 malignancy and dehydration 8.5 now   -   #UTI - UA positive  - started on  Rocephin

## 2021-09-19 NOTE — PROGRESS NOTE ADULT - SUBJECTIVE AND OBJECTIVE BOX
QUIN HERNADEZ    Patient is a 89y old  Female who presents with a chief complaint of acute renal failure (19 Sep 2021 09:50)    HPI:  89 yo female w/ PMH of pancreatic mass, CAD, HTN presents for unresponsiveness. Unable to provide further HPI due to mental status. Per nursing home NP, baseline mental status is A&Ox1, for past couple weeks a little bit more confused than normal, yesterday started becoming lethargic and minimally responsive.Patient is from [facility] and was DNR/DNI/DNH but due to her worsening condition. Sons were spoken to and initially rescinded the DNR/DNI/DNH but due to patient's clinical condition and poor prognosis, patient was made DNR/DNI.    (13 Sep 2021 23:57)    INTERVAL HPI/OVERNIGHT EVENTS: Pt was admitted to ICU with AMS, OSMAR, UTI, dialysis was not done, but [place] was placed. OSMAR has been improving since admission as well as mental status. Pt baseline is demented, she does not recognise her children, AOx0.   Last night events noted. Pt was found to have rectal bleeding. CBC done STAT at night showed Hb down to 8.9, not tachycardic baseline mild hypotension. Today it was noted again she had BM very dark. Pt also has very poor oral intake. NGT placement attempted by medicine residents and ENT with no success. Pt is on 1:1 feeds.     ROS: unable to obtain due to dementia.     PHYSICAL EXAM:  T(C): 36.4, Max: 36.5 (09-19-21 @ 01:01)  HR: 89 (66 - 89)  BP: 118/71 (110/53 - 118/71)  RR: 18 (18 - 18)  SpO2: --    GENERAL: NAD, talkative, but very confused  PULMONARY/CHEST: No rales, rhonchi, wheezing  CARDIOVASC: Regular rate and rhythm; No murmurs  GI/ABDOMEN: Soft, Nontender, Nondistended; Bowel sounds present  EXTREMITIES: RLE [place] in Right femoral vein. No legs edema.  NERVOUS SYSTEM:  Alert & Oriented X0, no gross neurological  deficit     LABS:                        9.1    15.02 )-----------( 195      ( 19 Sep 2021 13:12 )             29.7     09-18    149<H>  |  109  |  88<HH>  ----------------------------<  85  3.7   |  29  |  2.3<H>    Ca    8.5      18 Sep 2021 09:16  Mg     2.1     09-18    TPro  5.0<L>  /  Alb  2.8<L>  /  TBili  0.3  /  DBili  x   /  AST  31  /  ALT  32  /  AlkPhos  107  09-18      RADIOLOGY & ADDITIONAL TESTS:  no new tests      MEDICATIONS  (STANDING):  alteplase    CDT Bolus. 4 milliGRAM(s) IntraCatheter. once  heparin   Injectable 5000 Unit(s) SubCutaneous every 12 hours  lactated ringers. 1000 milliLiter(s) (50 mL/Hr) IV Continuous <Continuous>  midodrine 10 milliGRAM(s) Oral every 8 hours    MEDICATIONS  (PRN):

## 2021-09-19 NOTE — CHART NOTE - NSCHARTNOTEFT_GEN_A_CORE
Called to evaluate potential GI bleed per rectum--patient's [place] site continues to be soiled with dark liquid stool, RN concerned about GI bleed. Patient is hemodynamically stable, 108-125 SBP and 86 HR, verbal. STAT CBC came back 1.9 Hemoglobin d/t likely dilution. Repeat STAT CBC came back 8.9 Hemoglobin.    INCOMPLETE NOTE Called to evaluate potential GI bleed per rectum--patient's Rosie site has been recurrently soiled with dark liquid stool, RN concerned about GI bleed. Patient is hemodynamically stable, 108-125 SBP and 86 HR, verbal. STAT CBC came back 1.9 hemoglobin d/t likely dilution. Repeat STAT CBC came back 8.9 hemoglobin and WBC trending from 12 to 15 since this morning. Consent for transfusion obtained over phone with son, s/p active T&S.    Midline was placed on R arm. Dressing changed at Rosie site.    F/u AM CBC, fecal occult blood, GI PCR, and C. Diff PCR.

## 2021-09-19 NOTE — HOSPICE CARE NOTE - CONVESATION DETAILS
Consult completed via phone call to patient's son Musa. Reviewed hospice philosophy and services. Musa requesting to speak with palliative care prior to deciding on hospice services. Musa also verbalizes he chooses not to have his mother return to [facility] as he was not pleased wit her care there. Hospice to follow up tomorrow after palliative care meeting.

## 2021-09-19 NOTE — PROGRESS NOTE ADULT - SUBJECTIVE AND OBJECTIVE BOX
Nephrology progress note    Patient was seen and examined, events over the last 24 h noted .  Cr imrpoved   o/n event noted "Conrad site has been recurrently soiled with dark liquid stool"    Allergies:  aspirin (Unknown)  penicillin (Unknown)  shellfish (Unknown)    Hospital Medications:   MEDICATIONS  (STANDING):  alteplase    CDT Bolus. 4 milliGRAM(s) IntraCatheter. once  heparin   Injectable 5000 Unit(s) SubCutaneous every 12 hours  lactated ringers. 1000 milliLiter(s) (50 mL/Hr) IV Continuous <Continuous>  midodrine 10 milliGRAM(s) Oral every 8 hours        VITALS:  T(F): 97.6 (21 @ 07:59), Max: 97.7 (21 @ 01:01)  HR: 89 (21 @ 13:07)  BP: 118/71 (21 @ 13:07)  RR: 18 (21 @ 13:07)  SpO2: --  Wt(kg): --     @ 07:01  -   @ 07:00  --------------------------------------------------------  IN: 550 mL / OUT: 1050 mL / NET: -500 mL     @ 07:01  -   @ 07:00  --------------------------------------------------------  IN: 600 mL / OUT: 175 mL / NET: 425 mL      Height (cm): 167 ( @ 22:43)    PHYSICAL EXAM:  Constitutional: NAD  HEENT: anicteric sclera, oropharynx clear, MMM  Neck: No JVD  Respiratory: CTAB, no wheezes, rales or rhonchi  Cardiovascular: S1, S2, RRR  Gastrointestinal: BS+, soft, NT/ND  Extremities: No cyanosis or clubbing. No peripheral edema  :  No valencia.   Skin: No rashes    LABS:      159<H>  |  118<H>  |  76<HH>  ----------------------------<  96  4.0   |  27  |  1.8<H>    Ca    9.1      19 Sep 2021 12:00  Mg     2.1         TPro  5.7<L>  /  Alb  3.2<L>  /  TBili  0.3  /  DBili      /  AST  26  /  ALT  30  /  AlkPhos  112                            9.1    15.02 )-----------( 195      ( 19 Sep 2021 13:12 )             29.7       Urine Studies:  Urinalysis Basic - ( 13 Sep 2021 19:20 )    Color: Yellow / Appearance: Turbid / S.017 / pH:   Gluc:  / Ketone: Negative  / Bili: Negative / Urobili: <2 mg/dL   Blood:  / Protein: 300 mg/dL / Nitrite: Negative   Leuk Esterase: Large / RBC: 2 /HPF / WBC >720 /HPF   Sq Epi:  / Non Sq Epi: 1 /HPF / Bacteria: Moderate        RADIOLOGY & ADDITIONAL STUDIES:

## 2021-09-19 NOTE — PROGRESS NOTE ADULT - ASSESSMENT
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 Hospital course complicated by GI bleed.     # Acute GI bleed  - I had an extensive conversation with 2 sons Jesus and Musa, Jesus is a HCP. They don't want invasive procedures such as EGD/colonoscopy or IR interventions. But they want transfusion if needed at this time.   - Repeat Hb is 9.1 <<8.9  - family now want palliative approach and interested in hospice eval.    # Decreased PO intake  - 1:1 feeds  - diet as per SLP recs  - NGT placement attempted by medicine and ENT, unsuccessful  - will do comfort feeds    # Sepsis secondary to UTI POA  - UCx Klebsiella oxytoca/Raoutella ornithinolytica sensitive to Rocephin  - 9/13 Blood cx: NGTD  - continue with valencia for now     # Metabolic encephalopathy 2/2 acute renal failure - mental status improved, pt is at baseline now, AOx0.  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     I spent 65 min on total encounter today including conversation for GOC with family.

## 2021-09-20 LAB
ALBUMIN SERPL ELPH-MCNC: 3 G/DL — LOW (ref 3.5–5.2)
ALP SERPL-CCNC: 100 U/L — SIGNIFICANT CHANGE UP (ref 30–115)
ALT FLD-CCNC: 26 U/L — SIGNIFICANT CHANGE UP (ref 0–41)
ANION GAP SERPL CALC-SCNC: 13 MMOL/L — SIGNIFICANT CHANGE UP (ref 7–14)
AST SERPL-CCNC: 24 U/L — SIGNIFICANT CHANGE UP (ref 0–41)
BASOPHILS # BLD AUTO: 0.02 K/UL — SIGNIFICANT CHANGE UP (ref 0–0.2)
BASOPHILS NFR BLD AUTO: 0.1 % — SIGNIFICANT CHANGE UP (ref 0–1)
BILIRUB SERPL-MCNC: 0.3 MG/DL — SIGNIFICANT CHANGE UP (ref 0.2–1.2)
BUN SERPL-MCNC: 65 MG/DL — CRITICAL HIGH (ref 10–20)
CALCIUM SERPL-MCNC: 8.6 MG/DL — SIGNIFICANT CHANGE UP (ref 8.5–10.1)
CHLORIDE SERPL-SCNC: 116 MMOL/L — HIGH (ref 98–110)
CO2 SERPL-SCNC: 27 MMOL/L — SIGNIFICANT CHANGE UP (ref 17–32)
CREAT SERPL-MCNC: 1.6 MG/DL — HIGH (ref 0.7–1.5)
EOSINOPHIL # BLD AUTO: 0.71 K/UL — HIGH (ref 0–0.7)
EOSINOPHIL NFR BLD AUTO: 5 % — SIGNIFICANT CHANGE UP (ref 0–8)
GLUCOSE SERPL-MCNC: 127 MG/DL — HIGH (ref 70–99)
HCT VFR BLD CALC: 25.1 % — LOW (ref 37–47)
HGB BLD-MCNC: 7.7 G/DL — LOW (ref 12–16)
IMM GRANULOCYTES NFR BLD AUTO: 2.1 % — HIGH (ref 0.1–0.3)
LACTATE SERPL-SCNC: 1.5 MMOL/L — SIGNIFICANT CHANGE UP (ref 0.7–2)
LYMPHOCYTES # BLD AUTO: 18.7 % — LOW (ref 20.5–51.1)
LYMPHOCYTES # BLD AUTO: 2.67 K/UL — SIGNIFICANT CHANGE UP (ref 1.2–3.4)
MAGNESIUM SERPL-MCNC: 1.9 MG/DL — SIGNIFICANT CHANGE UP (ref 1.8–2.4)
MCHC RBC-ENTMCNC: 30.6 PG — SIGNIFICANT CHANGE UP (ref 27–31)
MCHC RBC-ENTMCNC: 30.7 G/DL — LOW (ref 32–37)
MCV RBC AUTO: 99.6 FL — HIGH (ref 81–99)
MONOCYTES # BLD AUTO: 0.94 K/UL — HIGH (ref 0.1–0.6)
MONOCYTES NFR BLD AUTO: 6.6 % — SIGNIFICANT CHANGE UP (ref 1.7–9.3)
NEUTROPHILS # BLD AUTO: 9.65 K/UL — HIGH (ref 1.4–6.5)
NEUTROPHILS NFR BLD AUTO: 67.5 % — SIGNIFICANT CHANGE UP (ref 42.2–75.2)
NRBC # BLD: 0 /100 WBCS — SIGNIFICANT CHANGE UP (ref 0–0)
PLATELET # BLD AUTO: 180 K/UL — SIGNIFICANT CHANGE UP (ref 130–400)
POTASSIUM SERPL-MCNC: 3.5 MMOL/L — SIGNIFICANT CHANGE UP (ref 3.5–5)
POTASSIUM SERPL-SCNC: 3.5 MMOL/L — SIGNIFICANT CHANGE UP (ref 3.5–5)
PROT SERPL-MCNC: 5.1 G/DL — LOW (ref 6–8)
RBC # BLD: 2.52 M/UL — LOW (ref 4.2–5.4)
RBC # FLD: 13.9 % — SIGNIFICANT CHANGE UP (ref 11.5–14.5)
SODIUM SERPL-SCNC: 156 MMOL/L — HIGH (ref 135–146)
WBC # BLD: 14.29 K/UL — HIGH (ref 4.8–10.8)
WBC # FLD AUTO: 14.29 K/UL — HIGH (ref 4.8–10.8)

## 2021-09-20 PROCEDURE — 99233 SBSQ HOSP IP/OBS HIGH 50: CPT

## 2021-09-20 PROCEDURE — 99497 ADVNCD CARE PLAN 30 MIN: CPT | Mod: 25

## 2021-09-20 RX ADMIN — MIDODRINE HYDROCHLORIDE 10 MILLIGRAM(S): 2.5 TABLET ORAL at 05:00

## 2021-09-20 RX ADMIN — HEPARIN SODIUM 5000 UNIT(S): 5000 INJECTION INTRAVENOUS; SUBCUTANEOUS at 05:00

## 2021-09-20 RX ADMIN — MIDODRINE HYDROCHLORIDE 10 MILLIGRAM(S): 2.5 TABLET ORAL at 13:59

## 2021-09-20 RX ADMIN — Medication 1 TABLET(S): at 11:38

## 2021-09-20 NOTE — PROGRESS NOTE ADULT - PROBLEM SELECTOR PLAN 4
-DNR/DNI  -Ongoing medical management  -Palliative care available to discuss GOC as appropriate -   	IN sum, continue abx if in progress; otherwise CMO and DC back to [place] with Hospice  - no labs  - no vitals  - no FS  - no IVF  - no injections  - no pulse ox  - no artificial feeds  - no oxygen supplementation (NC is OK)  - comfort feeding as desired.

## 2021-09-20 NOTE — HOSPICE CARE NOTE - CONVESATION DETAILS
Follow up call received from patient's sons and Juliet Rivera from palliative care. Further reviewed hospice philosophy and services. Family conflicted about home hospice versus return back to [facility].  Pending further family discussion. Hospice to remain available.

## 2021-09-20 NOTE — PROGRESS NOTE ADULT - SUBJECTIVE AND OBJECTIVE BOX
QUIN HERNADEZ             MRN-[id number]      Patient is a 89y old Female who presents with a chief complaint of acute renal failure (20 Sep 2021 07:46)    Currently admitted with the primary diagnosis of: sepsis/AMS       SUBJECTIVE:  talkative; "feel fine"; "go home"; "eger    ROS:      DYSPNEA:  N	  NAUS/VOM: N	  SECRETIONS: N	  AGITATION:N  Pain (Y/N):     n  -Provocation/Palliation:  -Quality/Quantity:  -Radiating:  -Severity:  -Timing/Frequency:  -Impact on ADLs:    PEx:   T(C): 35.2 (09-20-21 @ 07:14), Max: 35.3 (09-20-21 @ 00:30)  HR: 82 (09-20-21 @ 07:14) (82 - 88)  BP: 116/68 (09-20-21 @ 07:14) (109/64 - 116/68)  RR: 18 (09-20-21 @ 07:14) (18 - 18)  SpO2: 99% (09-20-21 @ 07:14) (99% - 99%)  Wt(kg): --            General:  found in bed in NAD  Eyes:  PER  Non icteric   ENMT: no external oral ulcers,   CVS: not tachy  Resp: Unlabored Non tachypneic No increased WOB  : valencia  Musc: No C/C/E    Neuro: Follows simple commands No focal deficits  Psych: Calm Pleasant, AAOx3    Skin: Non jaundiced , no rash   Lymph: no adenopathy     Last BM: 19th per emr    ALLERGIES: aspirin (Unknown)  penicillin (Unknown)  shellfish (Unknown)        Labs:	    CBC:                        7.7    14.29 )-----------( 180      ( 20 Sep 2021 08:25 )             25.1     CMP:    09-20    156<H>  |  116<H>  |  65<HH>  ----------------------------<  127<H>  3.5   |  27  |  1.6<H>    Ca    8.6      20 Sep 2021 08:25  Mg     1.9     09-20    TPro  5.1<L>  /  Alb  3.0<L>  /  TBili  0.3  /  DBili  x   /  AST  24  /  ALT  26  /  AlkPhos  100  09-20          EKG  12 Lead ECG:   Ventricular Rate 89 BPM    Atrial Rate 89 BPM    P-R Interval 142 ms    QRS Duration 84 ms    Q-T Interval 340 ms    QTC Calculation(Bazett) 413 ms    P Axis 58 degrees    R Axis -1 degrees    T Axis 54 degrees    Diagnosis Line Normal sinus rhythm  Normal ECG    Confirmed by PAULINA MEJIA MD ([id number]) on 9/13/2021 10:59:38 PM (09-13-21 @ 17:12)      Imaging Personally Reviewed:  [ ] YES  [x ] NO    Consultant(s) Notes Reviewed:  [ x] YES  [ ] NO  Care Discussed with Consultants/Other Providers [ x] YES  [ ] NO    Medications:	      MEDICATIONS  (STANDING):  dextrose 5%. 1000 milliLiter(s) (75 mL/Hr) IV Continuous <Continuous>    MEDICATIONS  (PRN):        ADVANCED DIRECTIVES:                   DNR DNI              DECISION MAKER: Patient [  ]  Family [x  ]  Other [  ] _______  LEGAL SURROGATE:      GOALS OF CARE DISCUSSION       Palliative care info/counseling provided	      	           Documentation of GOC/Advanced Care Planning:      PSYCHOSOCIAL-SPIRITUAL ASSESSMENT:         See Palliative Care SW/ documentation      CURRENT DISPO PLAN:            Hospice           REFERRALS	        Palliative Med        Unit SW/Case Mgmt              Hospice        QUIN HERNADEZ             MRN-[id number]      Patient is a 89y old Female who presents with a chief complaint of acute renal failure (20 Sep 2021 07:46)    Currently admitted with the primary diagnosis of: sepsis/AMS       SUBJECTIVE:  talkative; "feel fine"; "go home"; "eger"    ROS:      DYSPNEA:  N	  NAUS/VOM: N	  SECRETIONS: N	  AGITATION:N  Pain (Y/N):     n  -Provocation/Palliation:  -Quality/Quantity:  -Radiating:  -Severity:  -Timing/Frequency:  -Impact on ADLs:    PEx:   T(C): 35.2 (09-20-21 @ 07:14), Max: 35.3 (09-20-21 @ 00:30)  HR: 82 (09-20-21 @ 07:14) (82 - 88)  BP: 116/68 (09-20-21 @ 07:14) (109/64 - 116/68)  RR: 18 (09-20-21 @ 07:14) (18 - 18)  SpO2: 99% (09-20-21 @ 07:14) (99% - 99%)  Wt(kg): --            General:  found in bed in NAD  Eyes:  PER  Non icteric   ENMT: no external oral ulcers,   CVS: not tachy  Resp: Unlabored Non tachypneic No increased WOB  : valencia  Musc: No C/C/E    Neuro: Follows simple commands No focal deficits  Psych: Calm Pleasant, AAOx3    Skin: Non jaundiced , no rash   Lymph: no adenopathy     Last BM: 19th per emr    ALLERGIES: aspirin (Unknown)  penicillin (Unknown)  shellfish (Unknown)        Labs:	    CBC:                        7.7    14.29 )-----------( 180      ( 20 Sep 2021 08:25 )             25.1     CMP:    09-20    156<H>  |  116<H>  |  65<HH>  ----------------------------<  127<H>  3.5   |  27  |  1.6<H>    Ca    8.6      20 Sep 2021 08:25  Mg     1.9     09-20    TPro  5.1<L>  /  Alb  3.0<L>  /  TBili  0.3  /  DBili  x   /  AST  24  /  ALT  26  /  AlkPhos  100  09-20          EKG  12 Lead ECG:   Ventricular Rate 89 BPM    Atrial Rate 89 BPM    P-R Interval 142 ms    QRS Duration 84 ms    Q-T Interval 340 ms    QTC Calculation(Bazett) 413 ms    P Axis 58 degrees    R Axis -1 degrees    T Axis 54 degrees    Diagnosis Line Normal sinus rhythm  Normal ECG    Confirmed by PAULINA MEJIA MD ([id number]) on 9/13/2021 10:59:38 PM (09-13-21 @ 17:12)      Imaging Personally Reviewed:  [ ] YES  [x ] NO    Consultant(s) Notes Reviewed:  [ x] YES  [ ] NO  Care Discussed with Consultants/Other Providers [ x] YES  [ ] NO    Medications:	      MEDICATIONS  (STANDING):  dextrose 5%. 1000 milliLiter(s) (75 mL/Hr) IV Continuous <Continuous>    MEDICATIONS  (PRN):        ADVANCED DIRECTIVES:                   DNR DNI              DECISION MAKER: Patient [  ]  Family [x  ]  Other [  ] _______  LEGAL SURROGATE:      GOALS OF CARE DISCUSSION       Palliative care info/counseling provided	      	           Documentation of GOC/Advanced Care Planning:      PSYCHOSOCIAL-SPIRITUAL ASSESSMENT:         See Palliative Care SW/ documentation      CURRENT DISPO PLAN:            Hospice           REFERRALS	        Palliative Med        Unit SW/Case Mgmt              Hospice

## 2021-09-20 NOTE — PROGRESS NOTE ADULT - ATTENDING COMMENTS
Pt had episode of dark stool overnight. hemodynamically stable.   no new complaints, she seems comfortable.    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 Hospital course complicated by GI bleed.     # Acute GI bleed  - Hb down to 7.7, no transfusion, pt is comfort measures only  - family don't want invasive interventions, NO GI involved    # Decreased PO intake  - 1:1 feeds  - diet as per SLP recs    # Sepsis secondary to UTI POA  - UCx Klebsiella oxytoca/Raoutella ornithinolytica sensitive to Rocephin - completed course    # Metabolic encephalopathy 2/2 acute renal failure - mental status improved, pt is at baseline now, AOx0.  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 Order placed, no more vitals, no more blood work. Comfort feeds. DC to [facility] with hospice care. CM team working on DC plan.     Spent >35 min on coordination of care.

## 2021-09-20 NOTE — PROGRESS NOTE ADULT - SUBJECTIVE AND OBJECTIVE BOX
QUIN HERNADEZ 89y Female  MRN#: [id number]   Hospital Day: 7d    HPI:  89 yo female w/ PMH of pancreatic mass, CAD, HTN presents for unresponsiveness. Unable to provide further HPI due to mental status. Per nursing home NP, baseline mental status is A&Ox1, for past couple weeks a little bit more confused than normal, yesterday started becoming lethargic and minimally responsive.Patient is from [facility] and was DNR/DNI/DNH but due to her worsening condition. Sons were spoken to and initially rescinded the DNR/DNI/DNH but due to patient's clinical condition and poor prognosis, patient was made DNR/DNI.     In the ED, labs were remarkable for K 8.1, bicarb 10, , Cr 8.6 (baseline 1.0 in february), Ca 11.4, protein 8.4, VBG pH 7.27, pCO2 29. UA is positive. EKG is NSR.     Vital Signs Last 24 Hrs  T(C): --  T(F): --  HR: 88 (13 Sep 2021 22:10) (84 - 150)  BP: 106/62 (13 Sep 2021 22:10) (85/49 - 169/104)  BP(mean): 79 (13 Sep 2021 22:10) (63 - 124)  ABP: --  ABP(mean): --  RR: 18 (13 Sep 2021 22:10) (16 - 18)  SpO2: 100% (13 Sep 2021 22:10) (97% - 100%)       (13 Sep 2021 23:57)      SUBJECTIVE  Patient has no new complaints.       INTERVAL HPI AND OVERNIGHT EVENTS:  Patient was examined and seen at bedside. This morning she is resting comfortably in bed and reports no issues or overnight events.    REVIEW OF SYMPTOMS:  CONSTITUTIONAL: No weakness, fevers or chills; No headaches  EYES: No visual changes, eye pain, or discharge  ENT: No vertigo; No ear pain or change in hearing; No sore throat or difficulty swallowing  NECK: No pain or stiffness  RESPIRATORY: No cough, wheezing, or hemoptysis; No shortness of breath  CARDIOVASCULAR: No chest pain or palpitations  GASTROINTESTINAL: No abdominal or epigastric pain; No nausea, vomiting, or hematemesis; No diarrhea or constipation; No melena or hematochezia  GENITOURINARY: No dysuria, frequency or hematuria  MUSCULOSKELETAL: No joint pain, no muscle pain, no weakness  NEUROLOGICAL: No numbness or weakness  SKIN: No itching or rashes    OBJECTIVE  PAST MEDICAL & SURGICAL HISTORY    ALLERGIES:  aspirin (Unknown)  penicillin (Unknown)  shellfish (Unknown)    MEDICATIONS:  STANDING MEDICATIONS  alteplase    CDT Bolus. 4 milliGRAM(s) IntraCatheter. once  dextrose 5%. 1000 milliLiter(s) IV Continuous <Continuous>  heparin   Injectable 5000 Unit(s) SubCutaneous every 12 hours  midodrine 10 milliGRAM(s) Oral every 8 hours    PRN MEDICATIONS      VITAL SIGNS: Last 24 Hours  T(C): 35.3 (20 Sep 2021 00:30), Max: 36.5 (19 Sep 2021 15:31)  T(F): 95.5 (20 Sep 2021 00:30), Max: 97.7 (19 Sep 2021 15:31)  HR: 88 (20 Sep 2021 05:00) (79 - 94)  BP: 109/64 (20 Sep 2021 05:00) (109/64 - 118/71)  BP(mean): --  RR: 18 (20 Sep 2021 00:30) (18 - 18)  SpO2: --    LABS:                        9.1    15.02 )-----------( 195      ( 19 Sep 2021 13:12 )             29.7     09-19    159<H>  |  118<H>  |  76<HH>  ----------------------------<  96  4.0   |  27  |  1.8<H>    Ca    9.1      19 Sep 2021 12:00  Mg     2.1     09-19    TPro  5.7<L>  /  Alb  3.2<L>  /  TBili  0.3  /  DBili  x   /  AST  26  /  ALT  30  /  AlkPhos  112  09-19          Lactate, Blood: 1.5 mmol/L (09-19-21 @ 12:00)      GI PCR Panel, Stool (collected 19 Sep 2021 10:38)  Source: .Stool Feces  Final Report (19 Sep 2021 21:13):    GI PCR Results: NOT detected    *******Please Note:*******    GI panel PCR evaluates for:    Campylobacter, Plesiomonas shigelloides, Salmonella,    Vibrio, Yersinia enterocolitica, Enteroaggregative    Escherichia coli (EAEC), Enteropathogenic E.coli (EPEC),    Enterotoxigenic E. coli (ETEC) lt/st, Shiga-like    toxin-producing E. coli (STEC) stx1/stx2,    Shigella/ Enteroinvasive E. coli (EIEC), Cryptosporidium,    Cyclospora cayetanensis, Entamoeba histolytica,    Giardia lamblia, Adenovirus F 40/41, Astrovirus,    Norovirus GI/GII, Rotavirus A, Sapovirus          RADIOLOGY:  US KIDNEY(S)       09/15/2021    IMPRESSION:  1.  No hydronephrosis bilaterally.  2.  Partially distended urinary bladder containing 400 mL of urine, despite presence of a Valencia catheter. Correlate for catheter function.  3.  Nonspecific debris within the bladder lumen. Correlate with urinalysis.    XR CHEST PORTABLE IMMED 1V        09/16/2021    Findings:  Support devices: None.  Cardiac/mediastinum/hilum: Unremarkable.  Lung parenchyma/Pleura: Within normal limits.  Skeleton/soft tissues: Old right-sided rib fractures as well as degenerative changes of the axial skeleton.  Impression:  No radiographic evidence of acute cardiopulmonary disease.  Bones as above.        PHYSICAL EXAM:  CONSTITUTIONAL: No acute distress, well-developed, well-groomed, AAOx1  HEAD: Atraumatic, normocephalic  PULMONARY: Clear to auscultation bilaterally; no wheezes, rales, or rhonchi  CARDIOVASCULAR: Regular rate and rhythm; no murmurs, rubs, or gallops  GASTROINTESTINAL: Soft, non-tender, non-distended; bowel sounds present  MUSCULOSKELETAL: 2+ peripheral pulses; no clubbing, no cyanosis, no edema  SKIN: No rashes or lesions; warm and dry; udallin place R femoral with mild tenderness to palpation, no swelling, erythema, or warmth    ASSESSMENT & PLAN  89 yo female w/ PMH of 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    #GOC  - Palliative following   - family aware of poor prognosis and likely not a great candidate for dialysis  - per palliative currently full medical management with DNR/DNI  - pending hospice eval     #Sepsis secondary to UTI  -hypotensive in the ED, was bolused (2.5L LR) did not require pressors. BP now stable   - UA positive  -9/13 Ucx- >100,000 CFU/ml Klebsiella oxytoca/Raoutella ornithinolytica sensitive to Rocephin  -9/13 Blood cx: NGTD  Plan:  - Rocephin for 5 days - completed  - continue with valencia for now given OSMAR     #Metabolic encephalopathy 2/2 acute renal failure  #Hyperkalemia with metabolic acidosis--resolved  #Uremia  - Bicarb 10, K 8.1, Cr 8, pH 7.27 on admission  - 9/18: HCO3 27 K 4.5 BUN 96 Cr 2.7  - s/p insulin and d50 and bicarb gtt  - strict Is/Os   - JOEY: no hydro, no stones, debris in bladder  - s/p R fem UDALL  - per nephro, no indication for RRT at this time     #Hypercalcemia--resolved  #h/o 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  - must discuss with family/palliative on nutrition going forward given poor PO intake and anatomical difficulty to place NGT    #DVT ppx: heparin subq  #GI ppx: not indicated  #Diet: dyaphagia 1 Pureed-Nectar consistency fluid per S&S  #Dispo: pending hospice eval  Code: DNR/DNI   QUIN HERNADEZ 89y Female  MRN#: [id number]   Hospital Day: 7d    HPI:  89 yo female w/ PMH of pancreatic mass, CAD, HTN presents for unresponsiveness. Unable to provide further HPI due to mental status. Per nursing home NP, baseline mental status is A&Ox1, for past couple weeks a little bit more confused than normal, yesterday started becoming lethargic and minimally responsive.Patient is from [facility] and was DNR/DNI/DNH but due to her worsening condition. Sons were spoken to and initially rescinded the DNR/DNI/DNH but due to patient's clinical condition and poor prognosis, patient was made DNR/DNI.     In the ED, labs were remarkable for K 8.1, bicarb 10, , Cr 8.6 (baseline 1.0 in february), Ca 11.4, protein 8.4, VBG pH 7.27, pCO2 29. UA is positive. EKG is NSR.     Vital Signs Last 24 Hrs  T(C): --  T(F): --  HR: 88 (13 Sep 2021 22:10) (84 - 150)  BP: 106/62 (13 Sep 2021 22:10) (85/49 - 169/104)  BP(mean): 79 (13 Sep 2021 22:10) (63 - 124)  ABP: --  ABP(mean): --  RR: 18 (13 Sep 2021 22:10) (16 - 18)  SpO2: 100% (13 Sep 2021 22:10) (97% - 100%)       (13 Sep 2021 23:57)      SUBJECTIVE  Patient has no new complaints.       INTERVAL HPI AND OVERNIGHT EVENTS:  Patient was examined and seen at bedside. This morning she is resting comfortably in bed and reports no issues or overnight events.    REVIEW OF SYMPTOMS:  CONSTITUTIONAL: No weakness, fevers or chills; No headaches  EYES: No visual changes, eye pain, or discharge  ENT: No vertigo; No ear pain or change in hearing; No sore throat or difficulty swallowing  NECK: No pain or stiffness  RESPIRATORY: No cough, wheezing, or hemoptysis; No shortness of breath  CARDIOVASCULAR: No chest pain or palpitations  GASTROINTESTINAL: No abdominal or epigastric pain; No nausea, vomiting, or hematemesis; No diarrhea or constipation; No melena or hematochezia  GENITOURINARY: No dysuria, frequency or hematuria  MUSCULOSKELETAL: No joint pain, no muscle pain, no weakness  NEUROLOGICAL: No numbness or weakness  SKIN: No itching or rashes    OBJECTIVE  PAST MEDICAL & SURGICAL HISTORY    ALLERGIES:  aspirin (Unknown)  penicillin (Unknown)  shellfish (Unknown)    MEDICATIONS:  STANDING MEDICATIONS  alteplase    CDT Bolus. 4 milliGRAM(s) IntraCatheter. once  dextrose 5%. 1000 milliLiter(s) IV Continuous <Continuous>  heparin   Injectable 5000 Unit(s) SubCutaneous every 12 hours  midodrine 10 milliGRAM(s) Oral every 8 hours    PRN MEDICATIONS      VITAL SIGNS: Last 24 Hours  T(C): 35.3 (20 Sep 2021 00:30), Max: 36.5 (19 Sep 2021 15:31)  T(F): 95.5 (20 Sep 2021 00:30), Max: 97.7 (19 Sep 2021 15:31)  HR: 88 (20 Sep 2021 05:00) (79 - 94)  BP: 109/64 (20 Sep 2021 05:00) (109/64 - 118/71)  BP(mean): --  RR: 18 (20 Sep 2021 00:30) (18 - 18)  SpO2: --    LABS:                        9.1    15.02 )-----------( 195      ( 19 Sep 2021 13:12 )             29.7     09-19    159<H>  |  118<H>  |  76<HH>  ----------------------------<  96  4.0   |  27  |  1.8<H>    Ca    9.1      19 Sep 2021 12:00  Mg     2.1     09-19    TPro  5.7<L>  /  Alb  3.2<L>  /  TBili  0.3  /  DBili  x   /  AST  26  /  ALT  30  /  AlkPhos  112  09-19          Lactate, Blood: 1.5 mmol/L (09-19-21 @ 12:00)      GI PCR Panel, Stool (collected 19 Sep 2021 10:38)  Source: .Stool Feces  Final Report (19 Sep 2021 21:13):    GI PCR Results: NOT detected    *******Please Note:*******    GI panel PCR evaluates for:    Campylobacter, Plesiomonas shigelloides, Salmonella,    Vibrio, Yersinia enterocolitica, Enteroaggregative    Escherichia coli (EAEC), Enteropathogenic E.coli (EPEC),    Enterotoxigenic E. coli (ETEC) lt/st, Shiga-like    toxin-producing E. coli (STEC) stx1/stx2,    Shigella/ Enteroinvasive E. coli (EIEC), Cryptosporidium,    Cyclospora cayetanensis, Entamoeba histolytica,    Giardia lamblia, Adenovirus F 40/41, Astrovirus,    Norovirus GI/GII, Rotavirus A, Sapovirus          RADIOLOGY:  US KIDNEY(S)       09/15/2021    IMPRESSION:  1.  No hydronephrosis bilaterally.  2.  Partially distended urinary bladder containing 400 mL of urine, despite presence of a Valencia catheter. Correlate for catheter function.  3.  Nonspecific debris within the bladder lumen. Correlate with urinalysis.    XR CHEST PORTABLE IMMED 1V        09/16/2021    Findings:  Support devices: None.  Cardiac/mediastinum/hilum: Unremarkable.  Lung parenchyma/Pleura: Within normal limits.  Skeleton/soft tissues: Old right-sided rib fractures as well as degenerative changes of the axial skeleton.  Impression:  No radiographic evidence of acute cardiopulmonary disease.  Bones as above.        PHYSICAL EXAM:  CONSTITUTIONAL: No acute distress, well-developed, well-groomed, AAOx1  HEAD: Atraumatic, normocephalic  PULMONARY: Clear to auscultation bilaterally; no wheezes, rales, or rhonchi  CARDIOVASCULAR: Regular rate and rhythm; no murmurs, rubs, or gallops  GASTROINTESTINAL: Soft, non-tender, non-distended; bowel sounds present  MUSCULOSKELETAL: 2+ peripheral pulses; no clubbing, no cyanosis, no edema  SKIN: No rashes or lesions; warm and dry; R femoral dressing dry and clean, with mild tenderness to palpation, no swelling, erythema, or warmth    ASSESSMENT & PLAN  89 yo female w/ PMH of 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    #GOC  - Palliative following - meeting with son today   - per palliative currently full medical management with DNR/DNI  - pending hospice eval     #Sepsis secondary to UTI  - hypotensive in the ED, was bolused (2.5L LR) did not require pressors. BP now stable   - UA positive  - 9/13 Ucx- >100,000 CFU/ml Klebsiella oxytoca/Raoutella ornithinolytica sensitive to Rocephin  - 9/13 Blood cx: NGTD  - course of rocephin completed   Plan:  - continue with valencia for now given OSMAR     #Metabolic encephalopathy 2/2 acute renal failure  #Hyperkalemia with metabolic acidosis--resolved  #Uremia  - Bicarb 10, K 8.1, Cr 8, pH 7.27 on admission >> 9/19/21 HCO3 27, K 4.0, Cr 1.8  - s/p insulin and d50 and bicarb gtt  - strict Is/Os   - JOEY: no hydro, no stones, debris in bladder  - s/p R fem UDALL  - per nephro, no need for HD  - will continue monitoring      #Hypernatremia   Na 159 9/19/21 PM  - c/w D5 for now, will dc if repeat Na <150 per nephro   - will f/u AM lab      #Hypercalcemia--resolved  #h/o 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  - must discuss with family/palliative on nutrition going forward given poor PO intake and anatomical difficulty to place NGT    #DVT ppx: heparin subq and ateplase   #GI ppx: not indicated  #Diet: dyaphagia 1 Pureed-Nectar consistency fluid per S&S  #Dispo: pending hospice eval  Code: DNR/DNI   QUIN HERNADEZ 89y Female  MRN#: [id number]   Hospital Day: 7d    HPI:  89 yo female w/ PMH of pancreatic mass, CAD, HTN presents for unresponsiveness. Unable to provide further HPI due to mental status. Per nursing home NP, baseline mental status is A&Ox1, for past couple weeks a little bit more confused than normal, yesterday started becoming lethargic and minimally responsive.Patient is from [facility] and was DNR/DNI/DNH but due to her worsening condition. Sons were spoken to and initially rescinded the DNR/DNI/DNH but due to patient's clinical condition and poor prognosis, patient was made DNR/DNI.     In the ED, labs were remarkable for K 8.1, bicarb 10, , Cr 8.6 (baseline 1.0 in february), Ca 11.4, protein 8.4, VBG pH 7.27, pCO2 29. UA is positive. EKG is NSR.     Vital Signs Last 24 Hrs  T(C): --  T(F): --  HR: 88 (13 Sep 2021 22:10) (84 - 150)  BP: 106/62 (13 Sep 2021 22:10) (85/49 - 169/104)  BP(mean): 79 (13 Sep 2021 22:10) (63 - 124)  ABP: --  ABP(mean): --  RR: 18 (13 Sep 2021 22:10) (16 - 18)  SpO2: 100% (13 Sep 2021 22:10) (97% - 100%)       (13 Sep 2021 23:57)      SUBJECTIVE  Patient has no new complaints.       INTERVAL HPI AND OVERNIGHT EVENTS:  Patient was examined and seen at bedside. This morning she is resting comfortably in bed and reports no issues or overnight events.    REVIEW OF SYMPTOMS:  CONSTITUTIONAL: No weakness, fevers or chills; No headaches  EYES: No visual changes, eye pain, or discharge  ENT: No vertigo; No ear pain or change in hearing; No sore throat or difficulty swallowing  NECK: No pain or stiffness  RESPIRATORY: No cough, wheezing, or hemoptysis; No shortness of breath  CARDIOVASCULAR: No chest pain or palpitations  GASTROINTESTINAL: No abdominal or epigastric pain; No nausea, vomiting, or hematemesis; No diarrhea or constipation; No melena or hematochezia  GENITOURINARY: No dysuria, frequency or hematuria  MUSCULOSKELETAL: No joint pain, no muscle pain, no weakness  NEUROLOGICAL: No numbness or weakness  SKIN: No itching or rashes    OBJECTIVE  PAST MEDICAL & SURGICAL HISTORY    ALLERGIES:  aspirin (Unknown)  penicillin (Unknown)  shellfish (Unknown)    MEDICATIONS:  STANDING MEDICATIONS  alteplase    CDT Bolus. 4 milliGRAM(s) IntraCatheter. once  dextrose 5%. 1000 milliLiter(s) IV Continuous <Continuous>  heparin   Injectable 5000 Unit(s) SubCutaneous every 12 hours  midodrine 10 milliGRAM(s) Oral every 8 hours    PRN MEDICATIONS      VITAL SIGNS: Last 24 Hours  T(C): 35.3 (20 Sep 2021 00:30), Max: 36.5 (19 Sep 2021 15:31)  T(F): 95.5 (20 Sep 2021 00:30), Max: 97.7 (19 Sep 2021 15:31)  HR: 88 (20 Sep 2021 05:00) (79 - 94)  BP: 109/64 (20 Sep 2021 05:00) (109/64 - 118/71)  BP(mean): --  RR: 18 (20 Sep 2021 00:30) (18 - 18)  SpO2: --    LABS:                        9.1    15.02 )-----------( 195      ( 19 Sep 2021 13:12 )             29.7     09-19    159<H>  |  118<H>  |  76<HH>  ----------------------------<  96  4.0   |  27  |  1.8<H>    Ca    9.1      19 Sep 2021 12:00  Mg     2.1     09-19    TPro  5.7<L>  /  Alb  3.2<L>  /  TBili  0.3  /  DBili  x   /  AST  26  /  ALT  30  /  AlkPhos  112  09-19          Lactate, Blood: 1.5 mmol/L (09-19-21 @ 12:00)      GI PCR Panel, Stool (collected 19 Sep 2021 10:38)  Source: .Stool Feces  Final Report (19 Sep 2021 21:13):    GI PCR Results: NOT detected    *******Please Note:*******    GI panel PCR evaluates for:    Campylobacter, Plesiomonas shigelloides, Salmonella,    Vibrio, Yersinia enterocolitica, Enteroaggregative    Escherichia coli (EAEC), Enteropathogenic E.coli (EPEC),    Enterotoxigenic E. coli (ETEC) lt/st, Shiga-like    toxin-producing E. coli (STEC) stx1/stx2,    Shigella/ Enteroinvasive E. coli (EIEC), Cryptosporidium,    Cyclospora cayetanensis, Entamoeba histolytica,    Giardia lamblia, Adenovirus F 40/41, Astrovirus,    Norovirus GI/GII, Rotavirus A, Sapovirus          RADIOLOGY:  US KIDNEY(S)       09/15/2021    IMPRESSION:  1.  No hydronephrosis bilaterally.  2.  Partially distended urinary bladder containing 400 mL of urine, despite presence of a Valencia catheter. Correlate for catheter function.  3.  Nonspecific debris within the bladder lumen. Correlate with urinalysis.    XR CHEST PORTABLE IMMED 1V        09/16/2021    Findings:  Support devices: None.  Cardiac/mediastinum/hilum: Unremarkable.  Lung parenchyma/Pleura: Within normal limits.  Skeleton/soft tissues: Old right-sided rib fractures as well as degenerative changes of the axial skeleton.  Impression:  No radiographic evidence of acute cardiopulmonary disease.  Bones as above.        PHYSICAL EXAM:  CONSTITUTIONAL: No acute distress, well-developed, well-groomed, AAOx1  HEAD: Atraumatic, normocephalic  PULMONARY: Clear to auscultation bilaterally; no wheezes, rales, or rhonchi  CARDIOVASCULAR: Regular rate and rhythm; no murmurs, rubs, or gallops  GASTROINTESTINAL: Soft, non-tender, non-distended; bowel sounds present  MUSCULOSKELETAL: 2+ peripheral pulses; no clubbing, no cyanosis, no edema  SKIN: No rashes or lesions; warm and dry; R femoral dressing dry and clean, with mild tenderness to palpation, no swelling, erythema, or warmth    ASSESSMENT & PLAN  89 yo female w/ PMH of 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    #GOC  - Palliative following - meeting with son today   - per palliative currently full medical management with DNR/DNI  - pending hospice eval     # Acute GI bleed  - Repeat Hb is 7.7 <<9.1 <<8.9  - will transfuse if needed     #Sepsis secondary to UTI  - hypotensive in the ED, was bolused (2.5L LR) did not require pressors. BP now stable   - UA positive  - 9/13 Ucx- >100,000 CFU/ml Klebsiella oxytoca/Raoutella ornithinolytica sensitive to Rocephin  - 9/13 Blood cx: NGTD  - course of rocephin completed   Plan:  - continue with valencia for now given OSMAR     #Metabolic encephalopathy 2/2 acute renal failure  #Hyperkalemia with metabolic acidosis--resolved  #Uremia  - Bicarb 10, K 8.1, Cr 8, pH 7.27 on admission >> 9/19/21 HCO3 27, K 4.0, Cr 1.8  - s/p insulin and d50 and bicarb gtt  - strict Is/Os   - JOEY: no hydro, no stones, debris in bladder  - s/p R fem UDALL  - per nephro, no need for HD  - will continue monitoring      #Hypernatremia   Na 159 9/19/21 PM  - c/w D5 for now, will dc if repeat Na <150 per nephro   - will f/u AM lab      #Hypercalcemia--resolved  #h/o 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  - must discuss with family/palliative on nutrition going forward given poor PO intake and anatomical difficulty to place NGT    #DVT ppx: heparin subq and ateplase   #GI ppx: not indicated  #Diet: dyaphagia 1 Pureed-Nectar consistency fluid per S&S  #Dispo: pending hospice eval  Code: DNR/DNI   QUIN HERNADEZ 89y Female  MRN#: [id number]   Hospital Day: 7d    HPI:  89 yo female w/ PMH of pancreatic mass, CAD, HTN presents for unresponsiveness. Unable to provide further HPI due to mental status. Per nursing home NP, baseline mental status is A&Ox1, for past couple weeks a little bit more confused than normal, yesterday started becoming lethargic and minimally responsive.Patient is from [facility] and was DNR/DNI/DNH but due to her worsening condition. Sons were spoken to and initially rescinded the DNR/DNI/DNH but due to patient's clinical condition and poor prognosis, patient was made DNR/DNI.     In the ED, labs were remarkable for K 8.1, bicarb 10, , Cr 8.6 (baseline 1.0 in february), Ca 11.4, protein 8.4, VBG pH 7.27, pCO2 29. UA is positive. EKG is NSR.     Vital Signs Last 24 Hrs  T(C): --  T(F): --  HR: 88 (13 Sep 2021 22:10) (84 - 150)  BP: 106/62 (13 Sep 2021 22:10) (85/49 - 169/104)  BP(mean): 79 (13 Sep 2021 22:10) (63 - 124)  ABP: --  ABP(mean): --  RR: 18 (13 Sep 2021 22:10) (16 - 18)  SpO2: 100% (13 Sep 2021 22:10) (97% - 100%)       (13 Sep 2021 23:57)      SUBJECTIVE  Patient has no new complaints.       INTERVAL HPI AND OVERNIGHT EVENTS:  Patient was examined and seen at bedside. This morning she is resting comfortably in bed and reports no issues or overnight events.    REVIEW OF SYMPTOMS:  CONSTITUTIONAL: No weakness, fevers or chills; No headaches  EYES: No visual changes, eye pain, or discharge  ENT: No vertigo; No ear pain or change in hearing; No sore throat or difficulty swallowing  NECK: No pain or stiffness  RESPIRATORY: No cough, wheezing, or hemoptysis; No shortness of breath  CARDIOVASCULAR: No chest pain or palpitations  GASTROINTESTINAL: No abdominal or epigastric pain; No nausea, vomiting, or hematemesis; No diarrhea or constipation; No melena or hematochezia  GENITOURINARY: No dysuria, frequency or hematuria  MUSCULOSKELETAL: No joint pain, no muscle pain, no weakness  NEUROLOGICAL: No numbness or weakness  SKIN: No itching or rashes    OBJECTIVE  PAST MEDICAL & SURGICAL HISTORY    ALLERGIES:  aspirin (Unknown)  penicillin (Unknown)  shellfish (Unknown)    MEDICATIONS:  STANDING MEDICATIONS  alteplase    CDT Bolus. 4 milliGRAM(s) IntraCatheter. once  dextrose 5%. 1000 milliLiter(s) IV Continuous <Continuous>  heparin   Injectable 5000 Unit(s) SubCutaneous every 12 hours  midodrine 10 milliGRAM(s) Oral every 8 hours    PRN MEDICATIONS      VITAL SIGNS: Last 24 Hours  T(C): 35.3 (20 Sep 2021 00:30), Max: 36.5 (19 Sep 2021 15:31)  T(F): 95.5 (20 Sep 2021 00:30), Max: 97.7 (19 Sep 2021 15:31)  HR: 88 (20 Sep 2021 05:00) (79 - 94)  BP: 109/64 (20 Sep 2021 05:00) (109/64 - 118/71)  BP(mean): --  RR: 18 (20 Sep 2021 00:30) (18 - 18)  SpO2: --    LABS:                        9.1    15.02 )-----------( 195      ( 19 Sep 2021 13:12 )             29.7     09-19    159<H>  |  118<H>  |  76<HH>  ----------------------------<  96  4.0   |  27  |  1.8<H>    Ca    9.1      19 Sep 2021 12:00  Mg     2.1     09-19    TPro  5.7<L>  /  Alb  3.2<L>  /  TBili  0.3  /  DBili  x   /  AST  26  /  ALT  30  /  AlkPhos  112  09-19          Lactate, Blood: 1.5 mmol/L (09-19-21 @ 12:00)      GI PCR Panel, Stool (collected 19 Sep 2021 10:38)  Source: .Stool Feces  Final Report (19 Sep 2021 21:13):    GI PCR Results: NOT detected    *******Please Note:*******    GI panel PCR evaluates for:    Campylobacter, Plesiomonas shigelloides, Salmonella,    Vibrio, Yersinia enterocolitica, Enteroaggregative    Escherichia coli (EAEC), Enteropathogenic E.coli (EPEC),    Enterotoxigenic E. coli (ETEC) lt/st, Shiga-like    toxin-producing E. coli (STEC) stx1/stx2,    Shigella/ Enteroinvasive E. coli (EIEC), Cryptosporidium,    Cyclospora cayetanensis, Entamoeba histolytica,    Giardia lamblia, Adenovirus F 40/41, Astrovirus,    Norovirus GI/GII, Rotavirus A, Sapovirus          RADIOLOGY:  US KIDNEY(S)       09/15/2021    IMPRESSION:  1.  No hydronephrosis bilaterally.  2.  Partially distended urinary bladder containing 400 mL of urine, despite presence of a Valencia catheter. Correlate for catheter function.  3.  Nonspecific debris within the bladder lumen. Correlate with urinalysis.    XR CHEST PORTABLE IMMED 1V        09/16/2021    Findings:  Support devices: None.  Cardiac/mediastinum/hilum: Unremarkable.  Lung parenchyma/Pleura: Within normal limits.  Skeleton/soft tissues: Old right-sided rib fractures as well as degenerative changes of the axial skeleton.  Impression:  No radiographic evidence of acute cardiopulmonary disease.  Bones as above.        PHYSICAL EXAM:  CONSTITUTIONAL: No acute distress, well-developed, well-groomed, AAOx1  HEAD: Atraumatic, normocephalic  PULMONARY: Clear to auscultation bilaterally; no wheezes, rales, or rhonchi  CARDIOVASCULAR: Regular rate and rhythm; no murmurs, rubs, or gallops  GASTROINTESTINAL: Soft, non-tender, non-distended; bowel sounds present  MUSCULOSKELETAL: 2+ peripheral pulses; no clubbing, no cyanosis, no edema  SKIN: No rashes or lesions; warm and dry; R femoral dressing dry and clean, with mild tenderness to palpation, no swelling, erythema, or warmth    ASSESSMENT & PLAN  89 yo female w/ PMH of 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    #GOC  - Palliative following - meeting with son today   - per palliative currently full medical management with DNR/DNI  - pending hospice eval     # Acute GI bleed  - Repeat Hb is 7.7 <<9.1 <<8.9  - will transfuse if needed     #Sepsis secondary to UTI  - hypotensive in the ED, was bolused (2.5L LR) did not require pressors. BP now stable   - UA positive  - 9/13 Ucx- >100,000 CFU/ml Klebsiella oxytoca/Raoutella ornithinolytica sensitive to Rocephin  - 9/13 Blood cx: NGTD  - course of rocephin completed   Plan:  - continue with valencia for now given OSMAR     #Metabolic encephalopathy 2/2 acute renal failure  #Hyperkalemia with metabolic acidosis--resolved  #Uremia  - Bicarb 10, K 8.1, Cr 8, pH 7.27 on admission >> 9/19/21 HCO3 27, K 4.0, Cr 1.8  - s/p insulin and d50 and bicarb gtt  - strict Is/Os   - JOEY: no hydro, no stones, debris in bladder  - s/p R fem UDALL  - per nephro, no need for HD  - will continue monitoring      #Hypernatremia   Na 159 9/19/21 PM  - c/w D5 for now, will dc if repeat Na <150 per nephro   - will f/u AM lab      #Hypercalcemia--resolved  #h/o 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  - must discuss with family/palliative on nutrition going forward given poor PO intake and anatomical difficulty to place NGT    #DVT ppx: heparin subq and ateplase   #GI ppx: not indicated  #Diet: dyaphagia 1 Pureed-Nectar consistency fluid per S&S  #Dispo: pending hospice eval  Code: DNR/DNI    RESIDENT ATTESTATION:  I agree with the above.

## 2021-09-20 NOTE — PROGRESS NOTE ADULT - SUBJECTIVE AND OBJECTIVE BOX
seen and examined  no distress   lying comfortable         PAST HISTORY  --------------------------------------------------------------------------------  No significant changes to PMH, PSH, FHx, SHx, unless otherwise noted    ALLERGIES & MEDICATIONS  --------------------------------------------------------------------------------  Allergies    aspirin (Unknown)  penicillin (Unknown)  shellfish (Unknown)    Intolerances      Standing Inpatient Medications  alteplase    CDT Bolus. 4 milliGRAM(s) IntraCatheter. once  dextrose 5%. 1000 milliLiter(s) IV Continuous <Continuous>  heparin   Injectable 5000 Unit(s) SubCutaneous every 12 hours  midodrine 10 milliGRAM(s) Oral every 8 hours        VITALS/PHYSICAL EXAM  --------------------------------------------------------------------------------  T(C): 35.3 (09-20-21 @ 00:30), Max: 36.5 (09-19-21 @ 15:31)  HR: 88 (09-20-21 @ 05:00) (79 - 94)  BP: 109/64 (09-20-21 @ 05:00) (109/64 - 118/71)  RR: 18 (09-20-21 @ 00:30) (18 - 18)  SpO2: --  Wt(kg): --  Height (cm): 167 (09-18-21 @ 22:43)      09-19-21 @ 07:01  -  09-20-21 @ 07:00  --------------------------------------------------------  IN: 800 mL / OUT: 1100 mL / NET: -300 mL      Physical Exam:  	Gen: NAD  	Pulm: CTA B/L  	CV:  S1S2; no rub  	Abd: +distended        LABS/STUDIES  --------------------------------------------------------------------------------              9.1    15.02 >-----------<  195      [09-19-21 @ 13:12]              29.7     159  |  118  |  76  ----------------------------<  96      [09-19-21 @ 12:00]  4.0   |  27  |  1.8        Ca     9.1     [09-19-21 @ 12:00]      Mg     2.1     [09-19-21 @ 12:00]    TPro  5.7  /  Alb  3.2  /  TBili  0.3  /  DBili  x   /  AST  26  /  ALT  30  /  AlkPhos  112  [09-19-21 @ 12:00]      Creatinine Trend:  SCr 1.8 [09-19 @ 12:00]  SCr 2.3 [09-18 @ 09:16]  SCr 2.7 [09-17 @ 21:44]  SCr 3.0 [09-17 @ 10:00]  SCr 4.4 [09-16 @ 05:45]    Urinalysis - [09-13-21 @ 19:20]      Color Yellow / Appearance Turbid / SG 1.017 / pH 6.5      Gluc Negative / Ketone Negative  / Bili Negative / Urobili <2 mg/dL       Blood Small / Protein 300 mg/dL / Leuk Est Large / Nitrite Negative      RBC 2 / WBC >720 / Hyaline 26 / Gran  / Sq Epi  / Non Sq Epi 1 / Bacteria Moderate

## 2021-09-20 NOTE — PROGRESS NOTE ADULT - ASSESSMENT
89 yo female w/ PMH of pancreatic mass, CAD, HTN presents for unresponsiveness.     Renal consulted for OSMAR with hyperkalemia, met acidosis and hypercalcemia.    #OSMAR - likely ATN   creatinine i noted   no need for HD   non oliguric   # hypernatremia , on d5 at 75 cc/h , will d/c if repeated sodium < 150  #Metabolic encephalopathy 2/2 acute renal failure and dehydration better   #Hyperkalemia with metabolic acidosis resolving  # hospice notes appreciated   # will sign off recall as needed

## 2021-09-20 NOTE — PROGRESS NOTE ADULT - ASSESSMENT
89[facility] with [facility] was A&Ox1 at baseline being treated for urosepsis and OSMAR (currently not needing HD) being followed for ongoing support with  GOC.     Patient seen at bedside. More talkative today;     At family request, met again. Lou ROJAS from hospice joined.  In sum, continue current medical plan with DNR/I      MEDD (morphine equivalent daily dose): na      See Recs below. Discussed with primary and palliative teams    Please call [phone] with questions or concerns 24/7.   We will continue to follow.      89[facility] was A&Ox1 at baseline being treated for urosepsis and OSMAR (currently not needing HD) being followed for ongoing support with  GOC.     Patient seen at bedside. More talkative today;     At family request, met again. Lou ROJAS from hospice joined.  In sum, continue abx if in progress; otherwise CMO and DC back to [facility] with Hospice      MEDD (morphine equivalent daily dose): na      See Recs below. Discussed with primary and palliative teams    Please call [phone] with questions or concerns 24/7.   We will continue to follow.

## 2021-09-20 NOTE — CHART NOTE - NSCHARTNOTEFT_GEN_A_CORE
-- During rounds, RD informed by PCA that pt is completely refusing to eat any of the food and can possible benefit from nutritional supplements. GOC discussion ongoing, nutrition discussion still pending. Currently on Dysphagia 1 + nectar thick liquids per SLP reccs.     Recommendations:   1. Order Ensure pudding TID   2. Order Prosource gelatein TID   3. Order magic Cup TID   4. order daily MVI   5. Order 3-day CC   6.Max encouragement/assistance appreciated w/ meals.    [phone]  Pt's f/u assessment due 9/21 -- will put in full note.     RD remains available: Sandra Barbour, CHARITYN [phone] -- During rounds, RD informed by PCA that pt is completely refusing to eat any of the food and can possible benefit from nutritional supplements. GOC discussion ongoing, nutrition discussion still pending. Currently on Dysphagia 1 + nectar thick liquids per SLP reccs.     Recommendations:   1. Order Ensure pudding TID   2. Order Prosource gelatein TID   3. Order magic Cup BID   4. order daily MVI if medically feasible  5. Order 2-day CC   6. Max encouragement/assistance appreciated w/ meals.  7. Please discuss Nutrition in Goals of care discussion     [phone] -- discussed all recc's  Pt's f/u assessment due 9/21 -- will put in full note.     RD remains available: Sandra Barbour, CHARITYN [phone]

## 2021-09-20 NOTE — PROGRESS NOTE ADULT - PROBLEM SELECTOR PLAN 2
-Workup and treatment per primary team  Recommend non-pharmacological interventions to prevent/treat delirium  - maintain day/night light cycles  - optimize sleep-wake cycle, minimize environmental noise  - reorientation frequently  - use verbal redirection as first line  - minimize restraints and lines  - ensure good bladder/bowel function  - ensure adequate pain control, use opioid sparing regimens when possible  - minimize use of anticholinergic, antihistaminic, and benzodiazepine medications  -soothing music as has good response
-Workup and treatment per primary team  Recommend non-pharmacological interventions to prevent/treat delirium  - maintain day/night light cycles  - optimize sleep-wake cycle, minimize environmental noise  - reorientation frequently  - use verbal redirection as first line  - minimize restraints and lines  - ensure good bladder/bowel function  - ensure adequate pain control, use opioid sparing regimens when possible  - minimize use of anticholinergic, antihistaminic, and benzodiazepine medications
-Workup and treatment per primary team  Recommend non-pharmacological interventions to prevent/treat delirium  - maintain day/night light cycles  - optimize sleep-wake cycle, minimize environmental noise  - reorientation frequently  - use verbal redirection as first line  - minimize restraints and lines  - ensure good bladder/bowel function  - ensure adequate pain control, use opioid sparing regimens when possible  - minimize use of anticholinergic, antihistaminic, and benzodiazepine medications  -soothing music as has good response
-Workup and treatment per primary team  Recommend non-pharmacological interventions to prevent/treat delirium  - maintain day/night light cycles  - optimize sleep-wake cycle, minimize environmental noise  - reorientation frequently  - use verbal redirection as first line  - minimize restraints and lines  - ensure good bladder/bowel function  - ensure adequate pain control, use opioid sparing regimens when possible  - minimize use of anticholinergic, antihistaminic, and benzodiazepine medications  -soothing music as has good response

## 2021-09-20 NOTE — ADVANCED PRACTICE NURSE CONSULT - ASSESSMENT
89 yo female w/ PMH of pancreatic mass, CAD, HTN presents (9/13) for unresponsiveness. Unable to provide further HPI due to mental status. Per nursing home NP, baseline mental status is A&Ox1, for past couple weeks a little bit more confused than normal, yesterday started becoming lethargic and minimally responsive.Patient is from [facility] and was DNR/DNI/DNH but due to her worsening condition. Sons were spoken to and initially rescinded the DNR/DNI/DNH but due to patient's clinical condition and poor prognosis, patient was made DNR/DNI.   In the ED, labs were remarkable for K 8.1, bicarb 10, , Cr 8.6 (baseline 1.0 in february), Ca 11.4, protein 8.4, VBG pH 7.27, pCO2 29. UA is positive. EKG is NSR.   Currently admitted to medicine, found to have acute renal failure, metabolic acidosis, and UTI s/p UDALL for HD trial now delayed until today per nephro as patient labs improving and she is likely a poor HD candidate. 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 Palliative consulted for possible hospice placement.      Received patient on 4B, laying supine in bed,  HOB elevated 30 degrees. Pt awake, confused consistent repeating "Where am I?" despite being told of location.  Primary RN Anna made aware of purpose of WOCN visit, agreeable to consult. With assistance from, PCA, turned patient to left side for skin assessment.     Type of wound: Stage 2 pressure injury   Location: left buttock   Wound measurements: 1cm x 1cm x 0.2cm   Tunneling/Undermining: none  Wound bed: pink tissue   Wound edges: attached   Periwound: blanchable erythema   Wound exudate: none  Wound odor: none  Induration, warmth: none   Wound pain: no s/s pain present on assessment    Type of wound: Deep tissue pressure injury/DTPI, evolving - ? beginning of Angelo terminal ulceration   Location: right sacrococcygeal  Wound measurements: 2.5cm x 4cm x 0.2cm; true depth indeterminable at this time   Tunneling/Undermining: none  Wound bed: opening deep purple tissue   Wound edges: attached, flush, irregular    Periwound: blanchable erythema   Wound exudate: none  Wound odor none  Induration, warmth: none  Wound pain: no s/s pain present on assessment    Type of wound: Deep tissue pressure injury/DTPI   Location: left heel  Wound measurements: 2cm x 3cm x 0cm; true depth indeterminable at this time   Tunneling/Undermining: none  Wound bed: intact purple tissue   Wound edges: attached   Periwound: intact   Wound exudate: none  Wound odor: none  Induration, erythema, warmth: none   Wound pain: no s/s pain present on assessment    Patient bedbound, very limited mobility in bed, + valencia, incontinent of large amount of loose bloody stool during WOCN visit. Ordered for dysphagia diet, probably inadequate intake as per reported Jose Antonio score.

## 2021-09-20 NOTE — PROGRESS NOTE ADULT - PROBLEM SELECTOR PLAN 3
no indication for HD at this time  plan per primary/renal teams -DNR/DNI  -Ongoing medical management  -Palliative care available to discuss GOC as appropriate    no symptoms to manage at present - call for recommendations prn

## 2021-09-20 NOTE — PROGRESS NOTE ADULT - PROBLEM SELECTOR PLAN 5
Improved. Close to goal  Encouraged to watch fatty intake, exercise more, and lose weight. compliant with medication Patient tolerated lipitor well without side effects. I feel the benefits of the medication outweigh the risks.    Is not getting adequate diet and exercise  Goals developed at last visit were not met   Follow up in 4 months  Care management needs are self-addressed.Self-management abilities addressed and patient is capable of managing his own disease.     -  In sum, they wish for CMO Recommend non-pharmacological interventions to prevent/treat delirium  - maintain day/night light cycles  - optimize sleep-wake cycle, minimize environmental noise  - reorientation frequently  - use verbal redirection as first line  - minimize restraints and lines  - ensure good bladder/bowel function  - ensure adequate pain control  - minimize use of anticholinergic, antihistaminic, and benzodiazepine medications.

## 2021-09-20 NOTE — PHYSICAL THERAPY INITIAL EVALUATION ADULT - SPECIFY REASON(S)
Attempted to see pt for b/s PT IE; however, Pt is confused & unable to follow single step command at this time. PT is not appropriate at this time 2* confusion & safety. Will f/u as appropriate.

## 2021-09-20 NOTE — CHART NOTE - NSCHARTNOTEFT_GEN_A_CORE
Patient was pleasant when approached.  Patient reported that her stomach hurt.  Writer informed staff RN.

## 2021-09-20 NOTE — ADVANCED PRACTICE NURSE CONSULT - RECOMMEDATIONS
1. Stage 2 left buttock pressure injury, DTPI right sacrococcygeal- Continue applying Coloplast Charly Protect protective barrier cream daily and prn after each incontinent episode.    2. DTPI left heel- Apply silicone foam Allevyn dressing to cushion area, decrease friction and shear, and prevent further skin breakdown. Change dressing q3d and prn for soiling.  -Assess skin/wound qshift, report changes to primary provider.     Additional recs:  -Continue turning/positioning patient from side-to-side q2h while in bed, or in accordance w/ pt's plan of care. Continue utilizing pillows and/or z-antione fluidized positioner to assist w/ turning/positioning.  -Continue to offload heels from bed surface with soft pillow under calfs or by applying offloading boots to BLEs.   -Continue utilizing one underpad underneath patient to contain incontinence episodes; change pad when saturated/soiled.   -When/if valencia d/'pamela, consider utilizing female incontinence device/PrimaFit (if patient candidate) to contain urinary incontinence.  -Continue nutrition consult for optimal wound healing & nutritional status.     Plan of Care: Primary RN Anna made aware of above recs. Spoke w/  covering  MD Velasco in regards to above. No further needs/recs from [facility] service at this time. Staff RN to perform routine skin/wound assessment and manage wound care. Questions or concerns or if wound worsens and reconsult needed, please contact [facility], [phone].

## 2021-09-21 ENCOUNTER — TRANSCRIPTION ENCOUNTER (OUTPATIENT)
Age: 86
End: 2021-09-21

## 2021-09-21 VITALS
OXYGEN SATURATION: 98 % | HEART RATE: 85 BPM | TEMPERATURE: 95 F | SYSTOLIC BLOOD PRESSURE: 108 MMHG | DIASTOLIC BLOOD PRESSURE: 51 MMHG | RESPIRATION RATE: 18 BRPM

## 2021-09-21 LAB — SARS-COV-2 RNA SPEC QL NAA+PROBE: SIGNIFICANT CHANGE UP

## 2021-09-21 PROCEDURE — 99239 HOSP IP/OBS DSCHRG MGMT >30: CPT

## 2021-09-21 NOTE — DISCHARGE NOTE PROVIDER - HOSPITAL COURSE
89 yo female w/ PMH of pancreatic mass, CAD, HTN presents for unresponsiveness. Unable to provide further HPI due to mental status. Per nursing home NP, baseline mental status is A&Ox1, for past couple weeks a little bit more confused than normal, yesterday started becoming lethargic and minimally responsive.Patient is from [facility] and was DNR/DNI/DNH but due to her worsening condition. Sons were spoken to and initially rescinded the DNR/DNI/DNH but due to patient's clinical condition and poor prognosis, patient was made DNR/DNI.     Floor Course  Patient was admitted to the floor on 9/13/21. She was found to have acute renal failure, metabolic acidosis, and UTI. Nephro was following and R femur UDALL was placed temporally due to consideration of hemodialysis. However patient is likely a poor candidate for hemodialysis, and thus, nephro did not pursue further intervention. She completed the course of rocephin for sepsis secondary to her UTI. Palliative care was involved and discussed with patient's family regarding patient's goal of care. By discharge, patient is clinically stable and on comfort measure only. She will be discharged back to [facility] with hospice service.    89 yo female w/ PMH of pancreatic mass, CAD, HTN presents for unresponsiveness. Unable to provide further HPI due to mental status. Per nursing home NP, baseline mental status is A&Ox1, for past couple weeks a little bit more confused than normal, yesterday started becoming lethargic and minimally responsive.Patient is from [facility] and was DNR/DNI/DNH but due to her worsening condition. Sons were spoken to and initially rescinded the DNR/DNI/DNH but due to patient's clinical condition and poor prognosis, patient was made DNR/DNI.     Floor Course  Patient was admitted to the floor on 9/13/21. She was found to have acute renal failure, metabolic acidosis, and UTI. Nephro was following and R femur UDALL was placed temporally due to consideration of hemodialysis. However patient is likely a poor candidate for hemodialysis, and thus, nephro did not pursue further intervention. She completed the course of rocephin for sepsis secondary to her UTI. Palliative care was involved and discussed with patient's family regarding patient's goal of care.  Hospital course got complicated by acute blood loss anemia due to JIMY bleed. family opted palliative approach, did not want to do EGD/colonoscopy or IR interventions. By discharge, patient is clinically stable and on comfort measure only. She will be discharged back to [facility] with hospice service.

## 2021-09-21 NOTE — DISCHARGE NOTE PROVIDER - CARE PROVIDER_API CALL
Akash Reardon  [address] [id number]  [address] [place] [id number]  Phone: [phone]  Fax: [phone]  Follow Up Time: 2 weeks

## 2021-09-21 NOTE — DISCHARGE NOTE NURSING/CASE MANAGEMENT/SOCIAL WORK - PATIENT PORTAL LINK FT
You can access the FollowMyHealth Patient Portal offered by [facility] by registering at the following website: [email or web address]. By joining [facility]’s FollowMyHealth portal, you will also be able to view your health information using other applications (apps) compatible with our system.

## 2021-09-21 NOTE — PROGRESS NOTE ADULT - REASON FOR ADMISSION
acute renal failure

## 2021-09-21 NOTE — SWALLOW BEDSIDE ASSESSMENT ADULT - SLP PERTINENT HISTORY OF CURRENT PROBLEM
Pt admitted w/ AMS, +toxic metabolic acute on chronic renal failure, on HD, metabolic acidosis, pancreatic mass, UTI. Pts family met w/ palliative, plan is now for CMO and D/C back to [facility] with Hospice
Pt admitted w/ AMS, +toxic metabolic acute on chronic renal failure, on HD, metabolic acidosis, pancreatic mass, UTI. Pts family met w/ palliative, plan is to continue current medical management.
Pt admitted w/ AMS, +toxic metabolic acute on chronic renal failure, on HD, metabolic acidosis, pancreatic mass, UTI.

## 2021-09-21 NOTE — PROGRESS NOTE ADULT - PROVIDER SPECIALTY LIST ADULT
Internal Medicine
Internal Medicine
Nephrology
Internal Medicine
Internal Medicine
Nephrology
Critical Care
Critical Care
Hospitalist
Internal Medicine
Nephrology
Palliative Care
Vascular Surgery
Critical Care
Hospitalist
Nephrology
Palliative Care

## 2021-09-21 NOTE — CHART NOTE - NSCHARTNOTEFT_GEN_A_CORE
Pt due for a f/u assessment today, however, pt now noted CMO.    ·  Problem: Goals of care, counseling/discussion.  ·  Plan: IN sum, continue abx if in progress; otherwise CMO and DC back to [facility] with Hospice  - no labs  - no vitals  - no FS  - no IVF  - no injections  - no pulse ox  - no artificial feeds  - no oxygen supplementation (NC is OK)  - comfort feeding as desired.    Rd to sign off.   Remains available: Sandra Barbour, CHARITY [id number]

## 2021-09-21 NOTE — PROGRESS NOTE ADULT - ASSESSMENT
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 Hospital course complicated by GI bleed.     # Acute GI bleed  - I had an extensive conversation with 2 sons Jesus and Musa, Jesus is a HCP. They don't want invasive procedures such as EGD/colonoscopy or IR interventions. But they want transfusion if needed at this time.   - Repeat Hb is 7.7 << 9.1 <<8.9  - family now want palliative approach and interested in hospice     # Decreased PO intake  - 1:1 feeds  - diet as per SLP recs  - NGT placement attempted by medicine and ENT, unsuccessful  - will do comfort feeds    # Sepsis secondary to UTI POA  - UCx Klebsiella oxytoca/Raoutella ornithinolytica sensitive to Rocephin  - 9/13 Blood cx: NGTD  - continue with valencia for now     # Metabolic encephalopathy 2/2 acute renal failure - mental status improved, pt is at baseline now, AOx0.  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

## 2021-09-21 NOTE — PROGRESS NOTE ADULT - SUBJECTIVE AND OBJECTIVE BOX
QUIN HERNADEZ 89y Female  MRN#: [id number]   Hospital Day: 8d    HPI:  89 yo female w/ PMH of pancreatic mass, CAD, HTN presents for unresponsiveness. Unable to provide further HPI due to mental status. Per nursing home NP, baseline mental status is A&Ox1, for past couple weeks a little bit more confused than normal, yesterday started becoming lethargic and minimally responsive.Patient is from [facility] and was DNR/DNI/DNH but due to her worsening condition. Sons were spoken to and initially rescinded the DNR/DNI/DNH but due to patient's clinical condition and poor prognosis, patient was made DNR/DNI.     In the ED, labs were remarkable for K 8.1, bicarb 10, , Cr 8.6 (baseline 1.0 in february), Ca 11.4, protein 8.4, VBG pH 7.27, pCO2 29. UA is positive. EKG is NSR.     Vital Signs Last 24 Hrs  T(C): --  T(F): --  HR: 88 (13 Sep 2021 22:10) (84 - 150)  BP: 106/62 (13 Sep 2021 22:10) (85/49 - 169/104)  BP(mean): 79 (13 Sep 2021 22:10) (63 - 124)  ABP: --  ABP(mean): --  RR: 18 (13 Sep 2021 22:10) (16 - 18)  SpO2: 100% (13 Sep 2021 22:10) (97% - 100%)       (13 Sep 2021 23:57)      SUBJECTIVE  Patient has no new complaints. No overnight event was noted.       INTERVAL HPI AND OVERNIGHT EVENTS:  Patient was examined and seen at bedside. This morning she is resting comfortably in bed.    REVIEW OF SYMPTOMS:  CONSTITUTIONAL: No weakness, fevers or chills; No headaches  EYES: No visual changes, eye pain, or discharge  ENT: No vertigo; No ear pain or change in hearing; No sore throat or difficulty swallowing  NECK: No pain or stiffness  RESPIRATORY: No cough, wheezing, or hemoptysis; No shortness of breath  CARDIOVASCULAR: No chest pain or palpitations  GASTROINTESTINAL: No abdominal or epigastric pain; No nausea, vomiting, or hematemesis; No diarrhea or constipation; No melena or hematochezia  GENITOURINARY: No dysuria, frequency or hematuria  MUSCULOSKELETAL: No joint pain, no muscle pain, no weakness  NEUROLOGICAL: No numbness or weakness  SKIN: No itching or rashes    OBJECTIVE  PAST MEDICAL & SURGICAL HISTORY    ALLERGIES:  aspirin (Unknown)  penicillin (Unknown)  shellfish (Unknown)    MEDICATIONS:  STANDING MEDICATIONS    PRN MEDICATIONS      VITAL SIGNS: Last 24 Hours  T(C): 36.8 (21 Sep 2021 00:00), Max: 36.8 (21 Sep 2021 00:00)  T(F): 98.2 (21 Sep 2021 00:00), Max: 98.2 (21 Sep 2021 00:00)  HR: 82 (21 Sep 2021 00:00) (80 - 82)  BP: 111/72 (21 Sep 2021 00:00) (111/72 - 152/80)  BP(mean): --  RR: 18 (21 Sep 2021 00:00) (18 - 18)  SpO2: --    LABS:                        7.7    14.29 )-----------( 180      ( 20 Sep 2021 08:25 )             25.1     09-20    156<H>  |  116<H>  |  65<HH>  ----------------------------<  127<H>  3.5   |  27  |  1.6<H>    Ca    8.6      20 Sep 2021 08:25  Mg     1.9     09-20    TPro  5.1<L>  /  Alb  3.0<L>  /  TBili  0.3  /  DBili  x   /  AST  24  /  ALT  26  /  AlkPhos  100  09-20          Lactate, Blood: 1.5 mmol/L (09-20-21 @ 08:25)      GI PCR Panel, Stool (collected 19 Sep 2021 10:38)  Source: .Stool Feces  Final Report (19 Sep 2021 21:13):    GI PCR Results: NOT detected    *******Please Note:*******    GI panel PCR evaluates for:    Campylobacter, Plesiomonas shigelloides, Salmonella,    Vibrio, Yersinia enterocolitica, Enteroaggregative    Escherichia coli (EAEC), Enteropathogenic E.coli (EPEC),    Enterotoxigenic E. coli (ETEC) lt/st, Shiga-like    toxin-producing E. coli (STEC) stx1/stx2,    Shigella/ Enteroinvasive E. coli (EIEC), Cryptosporidium,    Cyclospora cayetanensis, Entamoeba histolytica,    Giardia lamblia, Adenovirus F 40/41, Astrovirus,    Norovirus GI/GII, Rotavirus A, Sapovirus          RADIOLOGY:  US KIDNEY(S)       09/15/2021    IMPRESSION:  1.  No hydronephrosis bilaterally.  2.  Partially distended urinary bladder containing 400 mL of urine, despite presence of a Valencia catheter. Correlate for catheter function.  3.  Nonspecific debris within the bladder lumen. Correlate with urinalysis.    XR CHEST PORTABLE IMMED 1V        09/16/2021    Findings:  Support devices: None.  Cardiac/mediastinum/hilum: Unremarkable.  Lung parenchyma/Pleura: Within normal limits.  Skeleton/soft tissues: Old right-sided rib fractures as well as degenerative changes of the axial skeleton.  Impression:  No radiographic evidence of acute cardiopulmonary disease.  Bones as above.      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    #GOC  - Palliative following - continue abx if in progress; otherwise CMO and DC back to er with Hospice  - pending hospice eval     # Acute GI bleed  - Repeat Hb is 7.7 <<9.1 <<8.9  - CMO, no transfusion/lab     #Sepsis secondary to UTI  - hypotensive in the ED, was bolused (2.5L LR) did not require pressors. BP now stable   - UA positive  - 9/13 Ucx- >100,000 CFU/ml Klebsiella oxytoca/Raoutella ornithinolytica sensitive to Rocephin  - 9/13 Blood cx: NGTD  - course of rocephin completed   Plan:  - continue with valencia for now given OSMAR     #Metabolic encephalopathy 2/2 acute renal failure  #Hyperkalemia with metabolic acidosis--resolved  #Uremia  - Bicarb 10, K 8.1, Cr 8, pH 7.27 on admission >> 9/19/21 HCO3 27, K 4.0, Cr 1.8  - s/p insulin and d50 and bicarb gtt  - strict Is/Os   - JOEY: no hydro, no stones, debris in bladder  - s/p R fem UDALL  - per nephro, no need for HD  - called vascular for removal of UDALL      #Hypernatremia   - IVF d/c   - CMO    #Hypercalcemia--resolved  #h/o 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 Unable to provide further HPI due to mental status. Per nursing home NP, baseline mental status is A&Ox1, for past couple weeks a little bit more confused than normal, yesterday started becoming lethargic and minimally responsive.Patient is from [facility] and was DNR/DNI/DNH but due to her worsening condition. Sons were spoken to and initially rescinded the DNR/DNI/DNH but due to patient's clinical condition and poor prognosis, patient was made DNR/DNI.     In the ED, labs were remarkable for K 8.1, bicarb 10, , Cr 8.6 (baseline 1.0 in february), Ca 11.4, protein 8.4, VBG pH 7.27, pCO2 29. UA is positive. EKG is NSR.     Vital Signs Last 24 Hrs  T(C): --  T(F): --  HR: 88 (13 Sep 2021 22:10) (84 - 150)  BP: 106/62 (13 Sep 2021 22:10) (85/49 - 169/104)  BP(mean): 79 (13 Sep 2021 22:10) (63 - 124)  ABP: --  ABP(mean): --  RR: 18 (13 Sep 2021 22:10) (16 - 18)  SpO2: 100% (13 Sep 2021 22:10) (97% - 100%)       (13 Sep 2021 23:57)      SUBJECTIVE  Patient has no new complaints. No overnight event was noted.       INTERVAL HPI AND OVERNIGHT EVENTS:  Patient was examined and seen at bedside. This morning she is resting comfortably in bed.    REVIEW OF SYMPTOMS:  CONSTITUTIONAL: No weakness, fevers or chills; No headaches  EYES: No visual changes, eye pain, or discharge  ENT: No vertigo; No ear pain or change in hearing; No sore throat or difficulty swallowing  NECK: No pain or stiffness  RESPIRATORY: No cough, wheezing, or hemoptysis; No shortness of breath  CARDIOVASCULAR: No chest pain or palpitations  GASTROINTESTINAL: No abdominal or epigastric pain; No nausea, vomiting, or hematemesis; No diarrhea or constipation; No melena or hematochezia  GENITOURINARY: No dysuria, frequency or hematuria  MUSCULOSKELETAL: No joint pain, no muscle pain, no weakness  NEUROLOGICAL: No numbness or weakness  SKIN: No itching or rashes    OBJECTIVE  PAST MEDICAL & SURGICAL HISTORY    ALLERGIES:  aspirin (Unknown)  penicillin (Unknown)  shellfish (Unknown)    MEDICATIONS:  STANDING MEDICATIONS    PRN MEDICATIONS      VITAL SIGNS: Last 24 Hours  T(C): 36.8 (21 Sep 2021 00:00), Max: 36.8 (21 Sep 2021 00:00)  T(F): 98.2 (21 Sep 2021 00:00), Max: 98.2 (21 Sep 2021 00:00)  HR: 82 (21 Sep 2021 00:00) (80 - 82)  BP: 111/72 (21 Sep 2021 00:00) (111/72 - 152/80)  BP(mean): --  RR: 18 (21 Sep 2021 00:00) (18 - 18)  SpO2: --    LABS:                        7.7    14.29 )-----------( 180      ( 20 Sep 2021 08:25 )             25.1     09-20    156<H>  |  116<H>  |  65<HH>  ----------------------------<  127<H>  3.5   |  27  |  1.6<H>    Ca    8.6      20 Sep 2021 08:25  Mg     1.9     09-20    TPro  5.1<L>  /  Alb  3.0<L>  /  TBili  0.3  /  DBili  x   /  AST  24  /  ALT  26  /  AlkPhos  100  09-20          Lactate, Blood: 1.5 mmol/L (09-20-21 @ 08:25)      GI PCR Panel, Stool (collected 19 Sep 2021 10:38)  Source: .Stool Feces  Final Report (19 Sep 2021 21:13):    GI PCR Results: NOT detected    *******Please Note:*******    GI panel PCR evaluates for:    Campylobacter, Plesiomonas shigelloides, Salmonella,    Vibrio, Yersinia enterocolitica, Enteroaggregative    Escherichia coli (EAEC), Enteropathogenic E.coli (EPEC),    Enterotoxigenic E. coli (ETEC) lt/st, Shiga-like    toxin-producing E. coli (STEC) stx1/stx2,    Shigella/ Enteroinvasive E. coli (EIEC), Cryptosporidium,    Cyclospora cayetanensis, Entamoeba histolytica,    Giardia lamblia, Adenovirus F 40/41, Astrovirus,    Norovirus GI/GII, Rotavirus A, Sapovirus          RADIOLOGY:  US KIDNEY(S)       09/15/2021    IMPRESSION:  1.  No hydronephrosis bilaterally.  2.  Partially distended urinary bladder containing 400 mL of urine, despite presence of a Valencia catheter. Correlate for catheter function.  3.  Nonspecific debris within the bladder lumen. Correlate with urinalysis.    XR CHEST PORTABLE IMMED 1V        09/16/2021    Findings:  Support devices: None.  Cardiac/mediastinum/hilum: Unremarkable.  Lung parenchyma/Pleura: Within normal limits.  Skeleton/soft tissues: Old right-sided rib fractures as well as degenerative changes of the axial skeleton.  Impression:  No radiographic evidence of acute cardiopulmonary disease.  Bones as above.      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    #GOC  - Palliative following - continue abx if in progress; otherwise CMO and DC back to er with Hospice  - pending hospice eval     # Acute GI bleed  - Repeat Hb is 7.7 <<9.1 <<8.9  - CMO, no transfusion/lab     #Sepsis secondary to UTI  - hypotensive in the ED, was bolused (2.5L LR) did not require pressors. BP now stable   - UA positive  - 9/13 Ucx- >100,000 CFU/ml Klebsiella oxytoca/Raoutella ornithinolytica sensitive to Rocephin  - 9/13 Blood cx: NGTD  - course of rocephin completed   Plan:  - continue with valencia for now given OSMAR     #Metabolic encephalopathy 2/2 acute renal failure  #Hyperkalemia with metabolic acidosis--resolved  #Uremia  - Bicarb 10, K 8.1, Cr 8, pH 7.27 on admission >> 9/19/21 HCO3 27, K 4.0, Cr 1.8  - s/p insulin and d50 and bicarb gtt  - strict Is/Os   - JOEY: no hydro, no stones, debris in bladder  - s/p R fem UDALL  - per nephro, no need for HD  - called vascular for removal of UDALL      #Hypernatremia   - IVF d/c   - CMO    #Hypercalcemia--resolved  #h/o 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 CMO.

## 2021-09-21 NOTE — CHART NOTE - NSCHARTNOTESELECT_GEN_ALL_CORE
ENT/Event Note
Event Note
Dietitian Limited note/Event Note
Dietitian Limited note/Event Note
Event Note
Event Note
Palliative Care SW Initial Assessment/Event Note
Palliative Care SW Note/Event Note
Palliative Care SW Note/Event Note
Phone Call/Event Note
Shankar/Event Note
Transfer Note

## 2021-09-21 NOTE — SWALLOW BEDSIDE ASSESSMENT ADULT - SLP GENERAL OBSERVATIONS
pt received in bed awake some confusion w/o c/o pain. Pt declining PO trials.
Pt received in bed awake and alert on O2 NC, confused
pt received in bed awake some confusion w/o c/o pain. Pt perseverative on wanting to go home.

## 2021-09-21 NOTE — DISCHARGE NOTE PROVIDER - NSDCCPCAREPLAN_GEN_ALL_CORE_FT
PRINCIPAL DISCHARGE DIAGNOSIS  Diagnosis: Sepsis secondary to UTI  Assessment and Plan of Treatment: WHAT YOU NEED TO KNOW:  A urinary tract infection (UTI) is caused by bacteria that get inside your urinary tract. Your urinary tract includes your kidneys, ureters, bladder, and urethra. Urine is made in your kidneys, and it flows from the ureters to the bladder. Urine leaves the bladder through the urethra. A UTI is more common in your lower urinary tract, which includes your bladder and urethra.  DISCHARGE INSTRUCTIONS:  Seek care immediately if:  You are urinating very little or not at all.  You are vomiting.  You have a high fever with shaking chills.  You have side or back pain that gets worse.  Call your doctor if:  You have a fever.  You are a woman and you have increased white or yellow discharge from your vagina.  You do not feel better after 2 days of taking antibiotics.  You have questions or concerns about your condition or care.  Prevent a UTI:  Urinate when you feel the urge. Do not hold your urine. Bacteria can grow if urine stays in the bladder too long. It may be helpful to urinate at least every 3 to 4 hours.  Urinate after you have sex to flush away bacteria that can enter your urinary tract during sex.  Wear cotton underwear and clothes that are loose. Tight pants and nylon underwear can trap moisture and cause bacteria to grow.  Cranberry juice or cranberry supplements may help prevent UTIs. Your healthcare provider can recommend the right juice or supplement for you.  Women should wipe front to back after urinating or having a bowel movement. This may prevent germs from getting into the urinary tract. Do not douche or use feminine deodorants. These can change the chemical balance in your vagina. You may also be given vaginal estrogen medicine. This medicine helps prevent recurrent UTIs in women who have gone through menopause or are in joaquin-menopause.  Follow up with your doctor as directed:  Write down your questions so you remember to ask them during your visits.      SECONDARY DISCHARGE DIAGNOSES  Diagnosis: Metabolic acidosis  Assessment and Plan of Treatment:      PRINCIPAL DISCHARGE DIAGNOSIS  Diagnosis: Sepsis secondary to UTI  Assessment and Plan of Treatment: resolved      SECONDARY DISCHARGE DIAGNOSES  Diagnosis: Metabolic acidosis  Assessment and Plan of Treatment:     Diagnosis: GI bleed  Assessment and Plan of Treatment:

## 2021-09-21 NOTE — DISCHARGE NOTE PROVIDER - CARE PROVIDERS DIRECT ADDRESSES
[email or web address]

## 2021-09-21 NOTE — SWALLOW BEDSIDE ASSESSMENT ADULT - SWALLOW EVAL: DIAGNOSIS
pt adamantly refused all PO trials despite encouragement from SLP
pt refused all PO trials despite encouragement from SLP
+overt s/s of penetration/aspiration w/ thin liquids, +toleration of puree and nectar thick liquids w/o overt s/s of penetration/aspiration

## 2021-09-21 NOTE — PROGRESS NOTE ADULT - SUBJECTIVE AND OBJECTIVE BOX
QUIN HERNADEZ    Patient is a 89y old  Female who presents with a chief complaint of acute renal failure (21 Sep 2021 12:05)    HPI:  89 yo female w/ PMH of pancreatic mass, CAD, HTN presents for unresponsiveness. Unable to provide further HPI due to mental status. Per nursing home NP, baseline mental status is A&Ox1, for past couple weeks a little bit more confused than normal, yesterday started becoming lethargic and minimally responsive.Patient is from [facility] and was DNR/DNI/DNH but due to her worsening condition. Sons were spoken to and initially rescinded the DNR/DNI/DNH but due to patient's clinical condition and poor prognosis, patient was made DNR/DNI.    (13 Sep 2021 23:57)    INTERVAL HPI/OVERNIGHT EVENTS: no events overnight, pt still has episodes of bloody BM. looks comfortable     ROS: unable to obtain due to dementia    PHYSICAL EXAM:  T(C): 35.2, Max: 36.8 (09-21-21 @ 00:00)  HR: 85 (82 - 85)  BP: 108/51 (108/51 - 111/72)  RR: 18 (18 - 18)  SpO2: 98% (98% - 98%)    GENERAL: NAD, talkative, but very confused  PULMONARY/CHEST: No rales, rhonchi, wheezing  CARDIOVASC: Regular rate and rhythm; No murmurs  GI/ABDOMEN: Soft, Nontender, Nondistended; Bowel sounds present  EXTREMITIES: RLE [place] in Right femoral vein. No legs edema.  NERVOUS SYSTEM:  Alert & Oriented X0, no gross neurological  deficit       LABS: no new labs                        RADIOLOGY & ADDITIONAL TESTS:  no new tests    MEDICATIONS  (STANDING):    MEDICATIONS  (PRN):

## 2021-09-21 NOTE — SWALLOW BEDSIDE ASSESSMENT ADULT - SWALLOW EVAL: RECOMMENDED DIET
dysphagia 1 w/ nectar-thick liquids
dysphagia 1 w/ nectar-thick liquids
dysphagia 1 w/ nectar thick liquids

## 2021-09-22 LAB
% ALBUMIN: 47.8 % — SIGNIFICANT CHANGE UP
% ALPHA 1: 10.2 % — SIGNIFICANT CHANGE UP
% ALPHA 2: 18.7 % — SIGNIFICANT CHANGE UP
% BETA: 11.7 % — SIGNIFICANT CHANGE UP
% GAMMA: 11.6 % — SIGNIFICANT CHANGE UP
ALBUMIN SERPL ELPH-MCNC: 2.4 G/DL — LOW (ref 3.6–5.5)
ALBUMIN/GLOB SERPL ELPH: 0.9 RATIO — SIGNIFICANT CHANGE UP
ALPHA1 GLOB SERPL ELPH-MCNC: 0.5 G/DL — HIGH (ref 0.1–0.4)
ALPHA2 GLOB SERPL ELPH-MCNC: 0.9 G/DL — SIGNIFICANT CHANGE UP (ref 0.5–1)
B-GLOBULIN SERPL ELPH-MCNC: 0.6 G/DL — SIGNIFICANT CHANGE UP (ref 0.5–1)
GAMMA GLOBULIN: 0.6 G/DL — SIGNIFICANT CHANGE UP (ref 0.6–1.6)
PROT PATTERN SERPL ELPH-IMP: SIGNIFICANT CHANGE UP

## 2021-09-24 DIAGNOSIS — Z91.013 ALLERGY TO SEAFOOD: ICD-10-CM

## 2021-09-24 DIAGNOSIS — E87.5 HYPERKALEMIA: ICD-10-CM

## 2021-09-24 DIAGNOSIS — R73.9 HYPERGLYCEMIA, UNSPECIFIED: ICD-10-CM

## 2021-09-24 DIAGNOSIS — G93.41 METABOLIC ENCEPHALOPATHY: ICD-10-CM

## 2021-09-24 DIAGNOSIS — I10 ESSENTIAL (PRIMARY) HYPERTENSION: ICD-10-CM

## 2021-09-24 DIAGNOSIS — Z88.0 ALLERGY STATUS TO PENICILLIN: ICD-10-CM

## 2021-09-24 DIAGNOSIS — Z91.09 OTHER ALLERGY STATUS, OTHER THAN TO DRUGS AND BIOLOGICAL SUBSTANCES: ICD-10-CM

## 2021-09-24 DIAGNOSIS — E86.0 DEHYDRATION: ICD-10-CM

## 2021-09-24 DIAGNOSIS — R65.21 SEVERE SEPSIS WITH SEPTIC SHOCK: ICD-10-CM

## 2021-09-24 DIAGNOSIS — Z66 DO NOT RESUSCITATE: ICD-10-CM

## 2021-09-24 DIAGNOSIS — A41.9 SEPSIS, UNSPECIFIED ORGANISM: ICD-10-CM

## 2021-09-24 DIAGNOSIS — E87.0 HYPEROSMOLALITY AND HYPERNATREMIA: ICD-10-CM

## 2021-09-24 DIAGNOSIS — N39.0 URINARY TRACT INFECTION, SITE NOT SPECIFIED: ICD-10-CM

## 2021-09-24 DIAGNOSIS — Z51.5 ENCOUNTER FOR PALLIATIVE CARE: ICD-10-CM

## 2021-09-24 DIAGNOSIS — F03.90 UNSPECIFIED DEMENTIA WITHOUT BEHAVIORAL DISTURBANCE: ICD-10-CM

## 2021-09-24 DIAGNOSIS — K92.2 GASTROINTESTINAL HEMORRHAGE, UNSPECIFIED: ICD-10-CM

## 2021-09-24 DIAGNOSIS — I25.10 ATHEROSCLEROTIC HEART DISEASE OF NATIVE CORONARY ARTERY WITHOUT ANGINA PECTORIS: ICD-10-CM

## 2021-09-24 DIAGNOSIS — E87.2 ACIDOSIS: ICD-10-CM

## 2021-09-24 DIAGNOSIS — E83.52 HYPERCALCEMIA: ICD-10-CM

## 2021-09-24 DIAGNOSIS — N17.0 ACUTE KIDNEY FAILURE WITH TUBULAR NECROSIS: ICD-10-CM

## 2021-09-24 DIAGNOSIS — K86.9 DISEASE OF PANCREAS, UNSPECIFIED: ICD-10-CM

## 2021-09-24 DIAGNOSIS — D62 ACUTE POSTHEMORRHAGIC ANEMIA: ICD-10-CM

## 2021-09-24 DIAGNOSIS — R53.2 FUNCTIONAL QUADRIPLEGIA: ICD-10-CM

## 2022-05-11 NOTE — PROCEDURE NOTE - NSCVLACTUALSITE_VASC_A_CORE
Chronic, controlled on sertraline 10 mg tablet daily.  Continue as prescribed.  Patient has no suicidal thoughts or ideations.  
This is a chronic ongoing issue, patient will discuss in details with her gynecology.  Will obtain CBC to assess for hemoglobin level, thyroid hormone levels.  
right/femoral vein

## 2023-09-20 NOTE — PROGRESS NOTE ADULT - SUBJECTIVE AND OBJECTIVE BOX
Nephrology progress note    Patient was seen and examined, events over the last 24 h noted .  Cr improving     Allergies:  aspirin (Unknown)  penicillin (Unknown)  shellfish (Unknown)    Hospital Medications:   MEDICATIONS  (STANDING):  alteplase    CDT Bolus. 4 milliGRAM(s) IntraCatheter. once  cefTRIAXone   IVPB 1000 milliGRAM(s) IV Intermittent every 24 hours  dextrose 5%. 1000 milliLiter(s) (50 mL/Hr) IV Continuous <Continuous>  heparin   Injectable 5000 Unit(s) SubCutaneous every 12 hours  midodrine 10 milliGRAM(s) Oral every 8 hours        VITALS:  T(F): 96.8 (21 @ 12:00), Max: 98.1 (21 @ 20:00)  HR: 67 (21 @ 12:00)  BP: 101/53 (21 @ 12:00)  RR: 18 (21 @ 12:00)  SpO2: 98% (21 @ 12:00)  Wt(kg): --    09-15 @ 07:  -   @ 07:00  --------------------------------------------------------  IN: 3350 mL / OUT: 190 mL / NET: 3160 mL     @ 07:01  -   @ 07:00  --------------------------------------------------------  IN: 900 mL / OUT: 1305 mL / NET: -405 mL     @ 07:01  -   @ 13:20  --------------------------------------------------------  IN: 0 mL / OUT: 200 mL / NET: -200 mL          PHYSICAL EXAM:  Constitutional: confused   Neck: No JVD  Respiratory: CTAB,   Cardiovascular: S1, S2, RRR  Gastrointestinal: BS+, soft, NT/ND  Extremities: No cyanosis or clubbing. No peripheral edema  :  No valencia.   Skin: No rashes  LABS:      152<H>  |  106  |  112<HH>  ----------------------------<  98  3.2<L>   |  32  |  3.0<H>    Ca    8.4<L>      17 Sep 2021 10:00  Mg     2.2         TPro  5.2<L>  /  Alb  3.0<L>  /  TBili  0.2  /  DBili      /  AST  32  /  ALT  34  /  AlkPhos  107                            10.2   11.58 )-----------( 161      ( 17 Sep 2021 10:00 )             32.9       Urine Studies:  Urinalysis Basic - ( 13 Sep 2021 19:20 )    Color: Yellow / Appearance: Turbid / S.017 / pH:   Gluc:  / Ketone: Negative  / Bili: Negative / Urobili: <2 mg/dL   Blood:  / Protein: 300 mg/dL / Nitrite: Negative   Leuk Esterase: Large / RBC: 2 /HPF / WBC >720 /HPF   Sq Epi:  / Non Sq Epi: 1 /HPF / Bacteria: Moderate        RADIOLOGY & ADDITIONAL STUDIES:   Home
